# Patient Record
Sex: FEMALE | Race: WHITE | NOT HISPANIC OR LATINO | Employment: OTHER | URBAN - METROPOLITAN AREA
[De-identification: names, ages, dates, MRNs, and addresses within clinical notes are randomized per-mention and may not be internally consistent; named-entity substitution may affect disease eponyms.]

---

## 2017-01-05 ENCOUNTER — COMMUNICATION - HEALTHEAST (OUTPATIENT)
Dept: FAMILY MEDICINE | Facility: CLINIC | Age: 72
End: 2017-01-05

## 2017-01-05 DIAGNOSIS — K64.9 HEMORRHOIDS: ICD-10-CM

## 2017-02-27 ENCOUNTER — HOSPITAL ENCOUNTER (OUTPATIENT)
Dept: MAMMOGRAPHY | Facility: HOSPITAL | Age: 72
Discharge: HOME | End: 2017-02-27
Attending: FAMILY MEDICINE

## 2017-02-27 DIAGNOSIS — Z12.31 SCREENING MAMMOGRAM, ENCOUNTER FOR: ICD-10-CM

## 2017-03-06 ENCOUNTER — COMMUNICATION - HEALTHEAST (OUTPATIENT)
Dept: FAMILY MEDICINE | Facility: CLINIC | Age: 72
End: 2017-03-06

## 2017-03-06 ENCOUNTER — AMBULATORY - HEALTHEAST (OUTPATIENT)
Dept: LAB | Facility: CLINIC | Age: 72
End: 2017-03-06

## 2017-03-06 DIAGNOSIS — Z12.11 COLON CANCER SCREENING: ICD-10-CM

## 2017-07-15 ENCOUNTER — HEALTH MAINTENANCE LETTER (OUTPATIENT)
Age: 72
End: 2017-07-15

## 2017-08-18 ENCOUNTER — COMMUNICATION - HEALTHEAST (OUTPATIENT)
Dept: FAMILY MEDICINE | Facility: CLINIC | Age: 72
End: 2017-08-18

## 2017-08-18 DIAGNOSIS — K64.9 HEMORRHOIDS: ICD-10-CM

## 2017-09-16 ENCOUNTER — COMMUNICATION - HEALTHEAST (OUTPATIENT)
Dept: FAMILY MEDICINE | Facility: CLINIC | Age: 72
End: 2017-09-16

## 2017-09-16 DIAGNOSIS — E78.01 ESSENTIAL FAMILIAL HYPERCHOLESTEROLEMIA: ICD-10-CM

## 2017-10-05 ENCOUNTER — COMMUNICATION - HEALTHEAST (OUTPATIENT)
Dept: FAMILY MEDICINE | Facility: CLINIC | Age: 72
End: 2017-10-05

## 2017-10-05 DIAGNOSIS — I10 ESSENTIAL HYPERTENSION: ICD-10-CM

## 2017-10-24 ENCOUNTER — OFFICE VISIT - HEALTHEAST (OUTPATIENT)
Dept: FAMILY MEDICINE | Facility: CLINIC | Age: 72
End: 2017-10-24

## 2017-10-24 DIAGNOSIS — E78.5 HYPERLIPIDEMIA: ICD-10-CM

## 2017-10-24 DIAGNOSIS — Z00.00 HEALTH MAINTENANCE EXAMINATION: ICD-10-CM

## 2017-10-24 DIAGNOSIS — Z12.11 COLON CANCER SCREENING: ICD-10-CM

## 2017-10-24 DIAGNOSIS — I10 HTN (HYPERTENSION): ICD-10-CM

## 2017-10-24 LAB
CHOLEST SERPL-MCNC: 150 MG/DL
FASTING STATUS PATIENT QL REPORTED: NO
HDLC SERPL-MCNC: 52 MG/DL
LDLC SERPL CALC-MCNC: 64 MG/DL
TRIGL SERPL-MCNC: 168 MG/DL

## 2017-10-24 ASSESSMENT — MIFFLIN-ST. JEOR: SCORE: 1229

## 2017-10-29 ENCOUNTER — COMMUNICATION - HEALTHEAST (OUTPATIENT)
Dept: FAMILY MEDICINE | Facility: CLINIC | Age: 72
End: 2017-10-29

## 2017-12-06 ENCOUNTER — COMMUNICATION - HEALTHEAST (OUTPATIENT)
Dept: FAMILY MEDICINE | Facility: CLINIC | Age: 72
End: 2017-12-06

## 2017-12-06 DIAGNOSIS — E78.01 ESSENTIAL FAMILIAL HYPERCHOLESTEROLEMIA: ICD-10-CM

## 2017-12-06 DIAGNOSIS — F32.A DEPRESSION: ICD-10-CM

## 2017-12-07 ENCOUNTER — COMMUNICATION - HEALTHEAST (OUTPATIENT)
Dept: FAMILY MEDICINE | Facility: CLINIC | Age: 72
End: 2017-12-07

## 2017-12-07 DIAGNOSIS — I10 ESSENTIAL HYPERTENSION: ICD-10-CM

## 2017-12-11 ENCOUNTER — RECORDS - HEALTHEAST (OUTPATIENT)
Dept: ADMINISTRATIVE | Facility: OTHER | Age: 72
End: 2017-12-11

## 2018-03-14 ENCOUNTER — HOSPITAL ENCOUNTER (OUTPATIENT)
Dept: MAMMOGRAPHY | Facility: CLINIC | Age: 73
Discharge: HOME | End: 2018-03-14

## 2018-03-14 DIAGNOSIS — Z12.31 VISIT FOR SCREENING MAMMOGRAM: ICD-10-CM

## 2018-03-24 ENCOUNTER — HEALTH MAINTENANCE LETTER (OUTPATIENT)
Age: 73
End: 2018-03-24

## 2018-04-23 ENCOUNTER — AMBULATORY - HEALTHEAST (OUTPATIENT)
Dept: LAB | Facility: CLINIC | Age: 73
End: 2018-04-23

## 2018-04-23 DIAGNOSIS — Z12.11 COLON CANCER SCREENING: ICD-10-CM

## 2018-04-23 LAB
HEMOCCULT SP1 STL QL: NEGATIVE
HEMOCCULT SP2 STL QL: NEGATIVE
HEMOCCULT SP3 STL QL: NEGATIVE

## 2018-04-24 ENCOUNTER — COMMUNICATION - HEALTHEAST (OUTPATIENT)
Dept: FAMILY MEDICINE | Facility: CLINIC | Age: 73
End: 2018-04-24

## 2018-07-22 ENCOUNTER — HEALTH MAINTENANCE LETTER (OUTPATIENT)
Age: 73
End: 2018-07-22

## 2018-12-22 ENCOUNTER — COMMUNICATION - HEALTHEAST (OUTPATIENT)
Dept: FAMILY MEDICINE | Facility: CLINIC | Age: 73
End: 2018-12-22

## 2018-12-22 DIAGNOSIS — I10 ESSENTIAL HYPERTENSION: ICD-10-CM

## 2019-01-10 ENCOUNTER — RECORDS - HEALTHEAST (OUTPATIENT)
Dept: ADMINISTRATIVE | Facility: OTHER | Age: 74
End: 2019-01-10

## 2019-01-14 ENCOUNTER — AMBULATORY - HEALTHEAST (OUTPATIENT)
Dept: SCHEDULING | Facility: CLINIC | Age: 74
End: 2019-01-14

## 2019-01-14 ENCOUNTER — OFFICE VISIT - HEALTHEAST (OUTPATIENT)
Dept: FAMILY MEDICINE | Facility: CLINIC | Age: 74
End: 2019-01-14

## 2019-01-14 DIAGNOSIS — F32.0 MILD MAJOR DEPRESSION (H): ICD-10-CM

## 2019-01-14 DIAGNOSIS — E78.00 PURE HYPERCHOLESTEROLEMIA: ICD-10-CM

## 2019-01-14 DIAGNOSIS — I10 ESSENTIAL HYPERTENSION: ICD-10-CM

## 2019-01-14 DIAGNOSIS — Z78.0 POSTMENOPAUSAL: ICD-10-CM

## 2019-01-14 DIAGNOSIS — Z00.01 ENCOUNTER FOR GENERAL ADULT MEDICAL EXAMINATION WITH ABNORMAL FINDINGS: ICD-10-CM

## 2019-01-14 DIAGNOSIS — Z12.11 ENCOUNTER FOR SCREENING FOR MALIGNANT NEOPLASM OF COLON: ICD-10-CM

## 2019-01-14 DIAGNOSIS — Z13.220 ENCOUNTER FOR SCREENING FOR LIPOID DISORDERS: ICD-10-CM

## 2019-01-14 DIAGNOSIS — Z12.31 ENCOUNTER FOR SCREENING MAMMOGRAM FOR MALIGNANT NEOPLASM OF BREAST: ICD-10-CM

## 2019-01-14 ASSESSMENT — MIFFLIN-ST. JEOR: SCORE: 1182.73

## 2019-01-16 ENCOUNTER — COMMUNICATION - HEALTHEAST (OUTPATIENT)
Dept: FAMILY MEDICINE | Facility: CLINIC | Age: 74
End: 2019-01-16

## 2019-01-16 ENCOUNTER — AMBULATORY - HEALTHEAST (OUTPATIENT)
Dept: LAB | Facility: CLINIC | Age: 74
End: 2019-01-16

## 2019-01-16 DIAGNOSIS — E78.00 PURE HYPERCHOLESTEROLEMIA: ICD-10-CM

## 2019-01-16 DIAGNOSIS — Z13.220 ENCOUNTER FOR SCREENING FOR LIPOID DISORDERS: ICD-10-CM

## 2019-01-16 DIAGNOSIS — I10 ESSENTIAL HYPERTENSION: ICD-10-CM

## 2019-01-16 LAB
ALBUMIN SERPL-MCNC: 4.2 G/DL (ref 3.5–5)
ALP SERPL-CCNC: 79 U/L (ref 45–120)
ALT SERPL W P-5'-P-CCNC: 15 U/L (ref 0–45)
ANION GAP SERPL CALCULATED.3IONS-SCNC: 10 MMOL/L (ref 5–18)
AST SERPL W P-5'-P-CCNC: 16 U/L (ref 0–40)
BILIRUB SERPL-MCNC: 0.5 MG/DL (ref 0–1)
BUN SERPL-MCNC: 20 MG/DL (ref 8–28)
CALCIUM SERPL-MCNC: 10.5 MG/DL (ref 8.5–10.5)
CHLORIDE BLD-SCNC: 107 MMOL/L (ref 98–107)
CHOLEST SERPL-MCNC: 163 MG/DL
CO2 SERPL-SCNC: 24 MMOL/L (ref 22–31)
CREAT SERPL-MCNC: 0.77 MG/DL (ref 0.6–1.1)
FASTING STATUS PATIENT QL REPORTED: YES
GFR SERPL CREATININE-BSD FRML MDRD: >60 ML/MIN/1.73M2
GLUCOSE BLD-MCNC: 100 MG/DL (ref 70–125)
HDLC SERPL-MCNC: 58 MG/DL
LDLC SERPL CALC-MCNC: 85 MG/DL
POTASSIUM BLD-SCNC: 4.4 MMOL/L (ref 3.5–5)
PROT SERPL-MCNC: 7 G/DL (ref 6–8)
SODIUM SERPL-SCNC: 141 MMOL/L (ref 136–145)
TRIGL SERPL-MCNC: 98 MG/DL

## 2019-01-24 ENCOUNTER — COMMUNICATION - HEALTHEAST (OUTPATIENT)
Dept: FAMILY MEDICINE | Facility: CLINIC | Age: 74
End: 2019-01-24

## 2019-01-28 ENCOUNTER — COMMUNICATION - HEALTHEAST (OUTPATIENT)
Dept: FAMILY MEDICINE | Facility: CLINIC | Age: 74
End: 2019-01-28

## 2019-01-28 LAB
HEMOCCULT SP1 STL QL: NEGATIVE
HEMOCCULT SP2 STL QL: NEGATIVE
HEMOCCULT SP3 STL QL: NEGATIVE

## 2019-02-04 ENCOUNTER — COMMUNICATION - HEALTHEAST (OUTPATIENT)
Dept: FAMILY MEDICINE | Facility: CLINIC | Age: 74
End: 2019-02-04

## 2019-02-04 DIAGNOSIS — E78.01 ESSENTIAL FAMILIAL HYPERCHOLESTEROLEMIA: ICD-10-CM

## 2019-02-04 DIAGNOSIS — K64.9 HEMORRHOIDS: ICD-10-CM

## 2019-02-04 DIAGNOSIS — F32.A DEPRESSION: ICD-10-CM

## 2019-02-04 DIAGNOSIS — I10 ESSENTIAL HYPERTENSION: ICD-10-CM

## 2019-02-08 ENCOUNTER — COMMUNICATION - HEALTHEAST (OUTPATIENT)
Dept: FAMILY MEDICINE | Facility: CLINIC | Age: 74
End: 2019-02-08

## 2019-03-11 ENCOUNTER — AMBULATORY - HEALTHEAST (OUTPATIENT)
Dept: FAMILY MEDICINE | Facility: CLINIC | Age: 74
End: 2019-03-11

## 2019-03-20 ENCOUNTER — COMMUNICATION - HEALTHEAST (OUTPATIENT)
Dept: FAMILY MEDICINE | Facility: CLINIC | Age: 74
End: 2019-03-20

## 2019-03-20 ENCOUNTER — HOSPITAL ENCOUNTER (OUTPATIENT)
Dept: MAMMOGRAPHY | Facility: CLINIC | Age: 74
Discharge: HOME | End: 2019-03-20
Attending: FAMILY MEDICINE

## 2019-03-20 DIAGNOSIS — Z12.11 ENCOUNTER FOR SCREENING FOR MALIGNANT NEOPLASM OF COLON: ICD-10-CM

## 2019-03-20 DIAGNOSIS — Z12.31 ENCOUNTER FOR SCREENING MAMMOGRAM FOR MALIGNANT NEOPLASM OF BREAST: ICD-10-CM

## 2019-04-20 ENCOUNTER — COMMUNICATION - HEALTHEAST (OUTPATIENT)
Dept: FAMILY MEDICINE | Facility: CLINIC | Age: 74
End: 2019-04-20

## 2019-04-20 DIAGNOSIS — F32.A DEPRESSION: ICD-10-CM

## 2019-06-06 ENCOUNTER — COMMUNICATION - HEALTHEAST (OUTPATIENT)
Dept: FAMILY MEDICINE | Facility: CLINIC | Age: 74
End: 2019-06-06

## 2019-06-06 ENCOUNTER — OFFICE VISIT - HEALTHEAST (OUTPATIENT)
Dept: FAMILY MEDICINE | Facility: CLINIC | Age: 74
End: 2019-06-06

## 2019-06-06 DIAGNOSIS — F41.9 ANXIETY: ICD-10-CM

## 2019-06-06 DIAGNOSIS — F32.A DEPRESSION, UNSPECIFIED DEPRESSION TYPE: ICD-10-CM

## 2019-06-13 ENCOUNTER — OFFICE VISIT - HEALTHEAST (OUTPATIENT)
Dept: FAMILY MEDICINE | Facility: CLINIC | Age: 74
End: 2019-06-13

## 2019-06-13 DIAGNOSIS — F32.A DEPRESSION: ICD-10-CM

## 2019-06-13 DIAGNOSIS — F32.A DEPRESSION, UNSPECIFIED DEPRESSION TYPE: ICD-10-CM

## 2019-06-20 ENCOUNTER — AMBULATORY - HEALTHEAST (OUTPATIENT)
Dept: NURSING | Facility: CLINIC | Age: 74
End: 2019-06-20

## 2019-06-20 DIAGNOSIS — I10 ESSENTIAL HYPERTENSION: ICD-10-CM

## 2019-06-21 ENCOUNTER — COMMUNICATION - HEALTHEAST (OUTPATIENT)
Dept: FAMILY MEDICINE | Facility: CLINIC | Age: 74
End: 2019-06-21

## 2019-12-06 ENCOUNTER — COMMUNICATION - HEALTHEAST (OUTPATIENT)
Dept: FAMILY MEDICINE | Facility: CLINIC | Age: 74
End: 2019-12-06

## 2019-12-06 DIAGNOSIS — F32.A DEPRESSION, UNSPECIFIED DEPRESSION TYPE: ICD-10-CM

## 2020-01-20 ENCOUNTER — COMMUNICATION - HEALTHEAST (OUTPATIENT)
Dept: FAMILY MEDICINE | Facility: CLINIC | Age: 75
End: 2020-01-20

## 2020-01-20 DIAGNOSIS — I10 ESSENTIAL HYPERTENSION: ICD-10-CM

## 2020-01-20 DIAGNOSIS — E78.01 ESSENTIAL FAMILIAL HYPERCHOLESTEROLEMIA: ICD-10-CM

## 2020-05-07 ENCOUNTER — COMMUNICATION - HEALTHEAST (OUTPATIENT)
Dept: FAMILY MEDICINE | Facility: CLINIC | Age: 75
End: 2020-05-07

## 2020-05-07 DIAGNOSIS — E78.01 ESSENTIAL FAMILIAL HYPERCHOLESTEROLEMIA: ICD-10-CM

## 2020-05-07 DIAGNOSIS — I10 ESSENTIAL HYPERTENSION: ICD-10-CM

## 2020-05-28 ENCOUNTER — COMMUNICATION - HEALTHEAST (OUTPATIENT)
Dept: FAMILY MEDICINE | Facility: CLINIC | Age: 75
End: 2020-05-28

## 2020-05-28 DIAGNOSIS — F32.A DEPRESSION: ICD-10-CM

## 2020-07-20 ENCOUNTER — COMMUNICATION - HEALTHEAST (OUTPATIENT)
Dept: FAMILY MEDICINE | Facility: CLINIC | Age: 75
End: 2020-07-20

## 2020-07-20 DIAGNOSIS — F32.A DEPRESSION: ICD-10-CM

## 2020-07-26 ENCOUNTER — COMMUNICATION - HEALTHEAST (OUTPATIENT)
Dept: FAMILY MEDICINE | Facility: CLINIC | Age: 75
End: 2020-07-26

## 2020-08-03 ENCOUNTER — COMMUNICATION - HEALTHEAST (OUTPATIENT)
Dept: FAMILY MEDICINE | Facility: CLINIC | Age: 75
End: 2020-08-03

## 2020-08-03 DIAGNOSIS — I10 ESSENTIAL HYPERTENSION: ICD-10-CM

## 2020-08-03 DIAGNOSIS — E78.01 ESSENTIAL FAMILIAL HYPERCHOLESTEROLEMIA: ICD-10-CM

## 2020-08-03 DIAGNOSIS — F32.A DEPRESSION, UNSPECIFIED DEPRESSION TYPE: ICD-10-CM

## 2020-09-12 ENCOUNTER — COMMUNICATION - HEALTHEAST (OUTPATIENT)
Dept: FAMILY MEDICINE | Facility: CLINIC | Age: 75
End: 2020-09-12

## 2020-09-12 DIAGNOSIS — I10 ESSENTIAL HYPERTENSION: ICD-10-CM

## 2020-09-12 DIAGNOSIS — E78.01 ESSENTIAL FAMILIAL HYPERCHOLESTEROLEMIA: ICD-10-CM

## 2020-09-12 DIAGNOSIS — F32.A DEPRESSION, UNSPECIFIED DEPRESSION TYPE: ICD-10-CM

## 2020-09-18 ENCOUNTER — OFFICE VISIT - HEALTHEAST (OUTPATIENT)
Dept: FAMILY MEDICINE | Facility: CLINIC | Age: 75
End: 2020-09-18

## 2020-09-18 DIAGNOSIS — E78.00 PURE HYPERCHOLESTEROLEMIA: ICD-10-CM

## 2020-09-18 DIAGNOSIS — Z86.0100 HISTORY OF COLONIC POLYPS: ICD-10-CM

## 2020-09-18 DIAGNOSIS — Z12.11 SCREEN FOR COLON CANCER: ICD-10-CM

## 2020-09-18 DIAGNOSIS — I10 ESSENTIAL HYPERTENSION: ICD-10-CM

## 2020-09-18 DIAGNOSIS — Z78.0 OSTEOPENIA AFTER MENOPAUSE: ICD-10-CM

## 2020-09-18 DIAGNOSIS — M85.80 OSTEOPENIA AFTER MENOPAUSE: ICD-10-CM

## 2020-09-18 DIAGNOSIS — F32.0 MILD MAJOR DEPRESSION (H): ICD-10-CM

## 2020-09-18 DIAGNOSIS — Z00.01 ENCOUNTER FOR GENERAL ADULT MEDICAL EXAMINATION WITH ABNORMAL FINDINGS: ICD-10-CM

## 2020-09-18 DIAGNOSIS — E55.9 VITAMIN D DEFICIENCY: ICD-10-CM

## 2020-09-18 LAB
ALBUMIN SERPL-MCNC: 4.6 G/DL (ref 3.5–5)
ALP SERPL-CCNC: 80 U/L (ref 45–120)
ALT SERPL W P-5'-P-CCNC: 17 U/L (ref 0–45)
ANION GAP SERPL CALCULATED.3IONS-SCNC: 11 MMOL/L (ref 5–18)
AST SERPL W P-5'-P-CCNC: 15 U/L (ref 0–40)
BILIRUB SERPL-MCNC: 0.6 MG/DL (ref 0–1)
BUN SERPL-MCNC: 15 MG/DL (ref 8–28)
CALCIUM SERPL-MCNC: 9.9 MG/DL (ref 8.5–10.5)
CHLORIDE BLD-SCNC: 105 MMOL/L (ref 98–107)
CHOLEST SERPL-MCNC: 179 MG/DL
CO2 SERPL-SCNC: 24 MMOL/L (ref 22–31)
CREAT SERPL-MCNC: 0.76 MG/DL (ref 0.6–1.1)
ERYTHROCYTE [DISTWIDTH] IN BLOOD BY AUTOMATED COUNT: 13.2 % (ref 11–14.5)
FASTING STATUS PATIENT QL REPORTED: YES
GFR SERPL CREATININE-BSD FRML MDRD: >60 ML/MIN/1.73M2
GLUCOSE BLD-MCNC: 99 MG/DL (ref 70–125)
HCT VFR BLD AUTO: 43.9 % (ref 35–47)
HDLC SERPL-MCNC: 53 MG/DL
HGB BLD-MCNC: 14.7 G/DL (ref 12–16)
LDLC SERPL CALC-MCNC: 106 MG/DL
MCH RBC QN AUTO: 29.4 PG (ref 27–34)
MCHC RBC AUTO-ENTMCNC: 33.5 G/DL (ref 32–36)
MCV RBC AUTO: 88 FL (ref 80–100)
PLATELET # BLD AUTO: 193 THOU/UL (ref 140–440)
PMV BLD AUTO: 7.4 FL (ref 7–10)
POTASSIUM BLD-SCNC: 4.5 MMOL/L (ref 3.5–5)
PROT SERPL-MCNC: 7.1 G/DL (ref 6–8)
RBC # BLD AUTO: 5.01 MILL/UL (ref 3.8–5.4)
SODIUM SERPL-SCNC: 140 MMOL/L (ref 136–145)
TRIGL SERPL-MCNC: 101 MG/DL
WBC: 7.9 THOU/UL (ref 4–11)

## 2020-09-18 ASSESSMENT — MIFFLIN-ST. JEOR: SCORE: 1196.9

## 2020-09-21 LAB — 25(OH)D3 SERPL-MCNC: 40.3 NG/ML (ref 30–80)

## 2020-09-22 ENCOUNTER — COMMUNICATION - HEALTHEAST (OUTPATIENT)
Dept: FAMILY MEDICINE | Facility: CLINIC | Age: 75
End: 2020-09-22

## 2020-12-15 ENCOUNTER — COMMUNICATION - HEALTHEAST (OUTPATIENT)
Dept: FAMILY MEDICINE | Facility: CLINIC | Age: 75
End: 2020-12-15

## 2020-12-15 DIAGNOSIS — E78.01 ESSENTIAL FAMILIAL HYPERCHOLESTEROLEMIA: ICD-10-CM

## 2020-12-15 DIAGNOSIS — I10 ESSENTIAL HYPERTENSION: ICD-10-CM

## 2021-03-02 ENCOUNTER — AMBULATORY - HEALTHEAST (OUTPATIENT)
Dept: NURSING | Facility: CLINIC | Age: 76
End: 2021-03-02

## 2021-03-23 ENCOUNTER — AMBULATORY - HEALTHEAST (OUTPATIENT)
Dept: NURSING | Facility: CLINIC | Age: 76
End: 2021-03-23

## 2021-04-03 ENCOUNTER — HEALTH MAINTENANCE LETTER (OUTPATIENT)
Age: 76
End: 2021-04-03

## 2021-05-28 NOTE — TELEPHONE ENCOUNTER
Refill Approved    Rx renewed per Medication Renewal Policy. Medication was last renewed on 12/10/17.    Maile Carreon, [facility] Triage/Med Refill 4/23/2019     Requested Prescriptions   Pending Prescriptions Disp Refills     FLUoxetine (PROZAC) 10 MG capsule [Pharmacy Med Name: FLUOXETINE HCL 10 MG Capsule] 180 capsule 3     Sig: TAKE 2 CAPSULES EVERY DAY       SSRI Refill Protocol  Passed - 4/23/2019 11:07 AM        Passed - PCP or prescribing provider visit in last year     Last office visit with prescriber/PCP: 9/2/2016 Melanie Broussard MD OR same dept: Visit date not found OR same specialty: 12/22/2016 Hafsa Lopez, DESMOND  Last physical: 10/24/2017 Last MTM visit: Visit date not found   Next visit within 3 mo: Visit date not found  Next physical within 3 mo: Visit date not found  Prescriber OR PCP: Melanie Broussard MD  Last diagnosis associated with med order: 1. Depression  - FLUoxetine (PROZAC) 10 MG capsule [Pharmacy Med Name: FLUOXETINE HCL 10 MG Capsule]; TAKE 2 CAPSULES EVERY DAY  Dispense: 180 capsule; Refill: 3    If protocol passes may refill for 12 months if within 3 months of last provider visit (or a total of 15 months).

## 2021-05-28 NOTE — TELEPHONE ENCOUNTER
Who is calling:  Nika  Reason for Call:  Checking status of refill request for fluoxetine  Date of last appointment with primary care: 1/14/19  Okay to leave a detailed message: Yes    Please call patient with update.

## 2021-05-29 NOTE — TELEPHONE ENCOUNTER
Fax received from [facility], they have started the Prior Authorization Process via Cover My Meds    CoverMyMeds Key: [id number]    Medication Name:   FLUoxetine (PROZAC) 10 MG capsule 270 capsule 1 6/13/2019     Sig - Route: Take 3 capsules (30 mg total) by mouth daily. Increased dose - Oral    Sent to pharmacy as: FLUoxetine (PROZAC) 10 MG capsule    E-Prescribing Status: Receipt confirmed by pharmacy (6/13/2019 10:26 AM CDT)          Insurance Plan: [employer] Part D  PBM:   Patient ID: not provided on fax    Please complete the PA process

## 2021-05-29 NOTE — PATIENT INSTRUCTIONS - HE
Crisis services  Walk-in crisis services are available Monday-Friday from 8 a.m.-5:30 p.m. at [facility].  Support is also available through the 24/7/365 crisis line at [phone].    [facility]   Address  [address] [place] [address]  Hours  Monday through Friday, 8 a.m. - 5:30 p.m.  Closed on Saturday, Sunday and holidays.        [facility] provides service to adults (ages 18 and over) in [facility] who are experiencing a mental health or chemical health crisis. Walk-in services include access to an onsite team of psychiatrists, nurses, social workers and trained peer support staff that provide person-centered, recovery-focused care.  [facility] offers an alternative to visiting the emergency room during a mental health crisis. You can expect excellent, professional service in a caring, supportive environment.  Phone support is available 24/7 and mobile crisis teams are available to meet you at your home, school, workplace or in the community.  If you are in crisis, please call [phone] for assistance. Calls are answered 24/7/365.

## 2021-05-29 NOTE — TELEPHONE ENCOUNTER
Central PA team  [phone]  Pool: [facility] ([id number])          PA has been initiated.       PA form completed and faxed insurance via Cover My Meds     Key:  [id number] - PA Case ID: [id number] - Rx #: [id number]     Medication:  FLUoxetine HCl 10MG capsules    Insurance:  [employer]        Response will be received via fax and may take up to 5-10 business days depending on plan

## 2021-05-29 NOTE — PROGRESS NOTES
I met with Nika Navarrete at the request of Dr. Weaver to recheck her blood pressure.  Blood pressure medications on the MAR were reviewed with patient.    Patient has taken all medications as per usual regimen: Yes  Patient reports tolerating them without any issues or concerns: Yes    Vitals:    06/20/19 1002 06/20/19 1008   BP: 140/80 125/81   Patient Site: Right Arm Left Arm   Patient Position: Sitting Sitting   Cuff Size: Adult Regular Adult Regular   Pulse: 63 67       After 5 minutes, the patient's blood pressure was < 140/90, the previous encounter was reviewed, recorded blood pressure below 140/90.  Patient was discharged and the note will be sent to the provider for final review.

## 2021-05-29 NOTE — PROGRESS NOTES
ASSESSMENT & PLAN:  1. Depression, anxiety  Significant improvement over the last week.  Continue with current medication regimen extended release Wellbutrin 150 mg daily and fluoxetine 20 mg daily.  She could increase to 30 mg daily if she needs.  Follow-up office visit in 6 months but certainly sooner if problems or concerns, but also advised her to return within a week for nurse blood pressure check to make sure her blood pressure is in normal range.  - buPROPion (WELLBUTRIN XL) 150 MG 24 hr tablet; Take 1 tablet (150 mg total) by mouth every morning.  Dispense: 90 tablet; Refill: 1  - FLUoxetine (PROZAC) 10 MG capsule; Take 3 capsules (30 mg total) by mouth daily. Increased dose  Dispense: 270 capsule; Refill: 1      Patient Instructions   Return within a week for nurse BP check.      No orders of the defined types were placed in this encounter.    Medications Discontinued During This Encounter   Medication Reason     buPROPion (WELLBUTRIN XL) 150 MG 24 hr tablet Reorder     FLUoxetine (PROZAC) 10 MG capsule Reorder       Return in about 6 months (around 12/13/2019) for Recheck anxiety.    CHIEF COMPLAINT:  Chief Complaint   Patient presents with     Medication Management     wellbutrin , feels like its working        HISTORY OF PRESENT ILLNESS:  Nika is a 73 y.o. female presenting to the clinic today to follow-up anxiety.  Was here a week ago with significant flare in anxiety symptoms.  She was on fluoxetine 10 mg daily and was insistent that she could not increase dose due to sedation in the past with higher doses.  After much discussion we added Wellbutrin.  On her own, she also increased her fluoxetine to 20 mg daily and on a couple of days she took a third dose for a total of 30 mg fluoxetine.  She feels dramatically better.  She has no concerns today.  She would like to continue with this medication regimen.  No side effects from the Wellbutrin.    Blood pressure is elevated today which is not typical  for her.  She states she drank a lot of caffeine this morning.    REVIEW OF SYSTEMS:   All other systems are negative.    PFSH:  Patient Active Problem List   Diagnosis     Essential Hypercholesterolemia     Overweight     Lump Or Mass In Breast     Hypertension     Mild major depression (H)     History of colonic polyps        TOBACCO USE:  Social History     Tobacco Use   Smoking Status Former Smoker   Smokeless Tobacco Never Used   Tobacco Comment    quit 2010       VITALS:  Vitals:    06/13/19 0959 06/13/19 1029   BP: 168/80 (!) 160/91   Patient Site: Left Arm    Patient Position: Sitting    Cuff Size: Adult Regular    Pulse: (!) 55    Resp: 16    Temp: 97.1  F (36.2  C)    TempSrc: Oral    Weight: 161 lb 6.4 oz (73.2 kg)      Wt Readings from Last 3 Encounters:   06/13/19 161 lb 6.4 oz (73.2 kg)   06/06/19 161 lb 3.2 oz (73.1 kg)   01/14/19 160 lb 6.4 oz (72.8 kg)     Body mass index is 28.82 kg/m .    PHYSICAL EXAM:  GENERAL: Pleasant, well-appearing patient in no acute distress.   HEENT: Pupils equal round. Sclerae and conjunctivae clear. Oropharynx with moist mucous membranes.   EXTREMITIES Appear well-perfused without edema.   NEURO: Alert and oriented. Grossly nonfocal.   PSYCHIATRIC: Presents on time and well groomed. Normal speech and thought content. Full affect. No abnormal movements or behaviors noted.      MEDICATIONS:  Current Outpatient Medications   Medication Sig Dispense Refill     acetaminophen (TYLENOL) 500 MG tablet Take 500 mg by mouth every 6 (six) hours as needed for pain.       atorvastatin (LIPITOR) 20 MG tablet Take 1 tablet (20 mg total) by mouth at bedtime. 90 tablet 3     buPROPion (WELLBUTRIN XL) 150 MG 24 hr tablet Take 1 tablet (150 mg total) by mouth every morning. 90 tablet 1     CALCIUM CARBONATE (ANTACID CALCIUM ORAL) Take by mouth.       diphenhydrAMINE-acetaminophen (TYLENOL PM)  mg Tab Take 2 tablets by mouth at bedtime.  0     FLUoxetine (PROZAC) 10 MG capsule Take 3  capsules (30 mg total) by mouth daily. Increased dose 270 capsule 1     GREEN TEA EXTRACT ORAL Take by mouth.       hydrocortisone (PROCTOZONE-HC) 2.5 % rectal cream Insert into the rectum 2 (two) times a day. (Patient taking differently: Insert into the rectum 2 (two) times a day as needed.       ) 30 g 9     lisinopril (PRINIVIL,ZESTRIL) 20 MG tablet Take 1 tablet (20 mg total) by mouth daily. 90 tablet 3     ranitidine (ZANTAC) 150 MG tablet Take 1 tablet (150 mg total) by mouth daily as needed for heartburn. 60 tablet 1     No current facility-administered medications for this visit.

## 2021-05-29 NOTE — PROGRESS NOTES
Please inform patient that her blood pressure is in the normal range.  She should follow-up her previous plans.  John Contreras MD  6/20/2019

## 2021-05-29 NOTE — PROGRESS NOTES
ASSESSMENT & PLAN:  1. Depression, unspecified depression type  She insists she will not tolerate a higher dose of fluoxetine due to side effect of tiredness.  We discussed options including switching to a different medication, but she may also experience tiredness with other SSRIs or SNRIs.  Discussed augmenting with Wellbutrin, which should not cause tiredness, but may be activating or potentially worsen anxiety.  She is willing to try this medication, but she was advised to stop it and notify me if having any worsening symptoms or any concerns of side effect.  She understands to also continue her current dose of fluoxetine.  Offered referral to counseling but she declines.  Contracted for safety, and given crisis line information.  Return in 1 week but certainly be in touch sooner if problems or concerns.  - buPROPion (WELLBUTRIN XL) 150 MG 24 hr tablet; Take 1 tablet (150 mg total) by mouth every morning.  Dispense: 30 tablet; Refill: 0        Patient Instructions   Crisis services  Walk-in crisis services are available Monday-Friday from 8 a.m.-5:30 p.m. at [facility].  Support is also available through the 24/7/365 crisis line at [phone].    [facility]   Address  [address] [place] [address]  Hours  Monday through Friday, 8 a.m. - 5:30 p.m.  Closed on Saturday, Sunday and holidays.        [facility] provides service to adults (ages 18 and over) in [facility] who are experiencing a mental health or chemical health crisis. Walk-in services include access to an onsite team of psychiatrists, nurses, social workers and trained peer support staff that provide person-centered, recovery-focused care.  [facility] offers an alternative to visiting the emergency room during a mental health crisis. You can expect excellent, professional service in a caring, supportive environment.  Phone support  is available 24/7 and mobile crisis teams are available to meet you at your home, school, workplace or in the community.  If you are in crisis, please call [phone] for assistance. Calls are answered 24/7/365.    No orders of the defined types were placed in this encounter.    There are no discontinued medications.    Return in about 1 week (around 6/13/2019) for Recheck.    CHIEF COMPLAINT:  Chief Complaint   Patient presents with     Medication Management     depression med check, c/o feeling sleepy       HISTORY OF PRESENT ILLNESS:  Nika is a 73 y.o. female presenting to the clinic today for depression and anxiety symptoms.  Reports a recent flare in her symptoms, both depression and anxiety symptoms, and currently unmanageable on her current fluoxetine 10 mg daily.  Has in the past tried to increase to 20 mg daily but could not tolerate due to significant tiredness on the increased dose.  She does not recall how long she stayed at the higher dose, but states she has tried it at least 3 separate times in the past.  Recently had a relationship and is trying to keep herself busy with various activities to keep her mind off things.  She has tried counseling in the past but it was cost prohibitive.  She did recently have a fleeting thought of being better off dead, which really scared her and prompted her to come in, as her sister previously had a suicide attempt.  She states the thought went in and out of her mind in a flash, and she has no current thoughts of hurting herself, no plan.  No access to firearms.    REVIEW OF SYSTEMS:   All other systems are negative.    PFSH:  Patient Active Problem List   Diagnosis     Essential Hypercholesterolemia     Overweight     Lump Or Mass In Breast     Hypertension     Mild major depression (H)     History of colonic polyps        TOBACCO USE:  Social History     Tobacco Use   Smoking Status Former Smoker   Smokeless Tobacco Never Used   Tobacco Comment    quit 2010        VITALS:  Vitals:    06/06/19 1043 06/06/19 1045 06/06/19 1113   BP: 156/79 148/81 123/73   Patient Site: Left Arm Left Arm Left Arm   Patient Position: Sitting Sitting Sitting   Cuff Size: Adult Regular Adult Regular Adult Regular   Pulse: (!) 59 60 (!) 58   Resp: 16     Temp: 97.9  F (36.6  C) 97.9  F (36.6  C)    TempSrc: Oral Oral    Weight: 161 lb 3.2 oz (73.1 kg)       Wt Readings from Last 3 Encounters:   06/06/19 161 lb 3.2 oz (73.1 kg)   01/14/19 160 lb 6.4 oz (72.8 kg)   10/24/17 170 lb 9.6 oz (77.4 kg)     Body mass index is 28.78 kg/m .    PHYSICAL EXAM:  GENERAL: Pleasant, well-appearing patient in no acute distress.   HEENT: Pupils equal round. Sclerae and conjunctivae clear. Oropharynx with moist mucous membranes. Decreased hearing.   EXTREMITIES Warm and well-perfused without edema.  NEURO: Alert and oriented. Grossly nonfocal.   PSYCHIATRIC: Presents on time and well groomed. Normal speech and thought content. Full affect. Tearful at times. No abnormal movements or behaviors noted.        MEDICATIONS:  Current Outpatient Medications   Medication Sig Dispense Refill     acetaminophen (TYLENOL) 500 MG tablet Take 500 mg by mouth every 6 (six) hours as needed for pain.       atorvastatin (LIPITOR) 20 MG tablet Take 1 tablet (20 mg total) by mouth at bedtime. 90 tablet 3     CALCIUM CARBONATE (ANTACID CALCIUM ORAL) Take by mouth.       diphenhydrAMINE-acetaminophen (TYLENOL PM)  mg Tab Take 2 tablets by mouth at bedtime.  0     FLUoxetine (PROZAC) 10 MG capsule TAKE 2 CAPSULES EVERY  capsule 2     GREEN TEA EXTRACT ORAL Take by mouth.       hydrocortisone (PROCTOZONE-HC) 2.5 % rectal cream Insert into the rectum 2 (two) times a day. (Patient taking differently: Insert into the rectum 2 (two) times a day as needed.       ) 30 g 9     lisinopril (PRINIVIL,ZESTRIL) 20 MG tablet Take 1 tablet (20 mg total) by mouth daily. 90 tablet 3     ranitidine (ZANTAC) 150 MG tablet Take 1 tablet (150  mg total) by mouth daily as needed for heartburn. 60 tablet 1     buPROPion (WELLBUTRIN XL) 150 MG 24 hr tablet Take 1 tablet (150 mg total) by mouth every morning. 30 tablet 0     No current facility-administered medications for this visit.

## 2021-05-31 ENCOUNTER — RECORDS - HEALTHEAST (OUTPATIENT)
Dept: ADMINISTRATIVE | Facility: CLINIC | Age: 76
End: 2021-05-31

## 2021-05-31 VITALS — HEIGHT: 63 IN | BODY MASS INDEX: 30.23 KG/M2 | WEIGHT: 170.6 LBS

## 2021-06-02 VITALS — HEIGHT: 63 IN | BODY MASS INDEX: 28.42 KG/M2 | WEIGHT: 160.4 LBS

## 2021-06-02 VITALS — WEIGHT: 161.2 LBS | BODY MASS INDEX: 28.78 KG/M2

## 2021-06-03 VITALS — BODY MASS INDEX: 28.82 KG/M2 | WEIGHT: 161.4 LBS

## 2021-06-04 VITALS
RESPIRATION RATE: 18 BRPM | BODY MASS INDEX: 29.13 KG/M2 | DIASTOLIC BLOOD PRESSURE: 96 MMHG | SYSTOLIC BLOOD PRESSURE: 146 MMHG | HEIGHT: 63 IN | TEMPERATURE: 98.1 F | HEART RATE: 67 BPM | WEIGHT: 164.4 LBS

## 2021-06-04 NOTE — TELEPHONE ENCOUNTER
Refill Approved    Rx renewed per Medication Renewal Policy. Medication was last renewed on 6/13/19, last OV 6/13/19.    Preeti Salamnaca, [facility] Triage/Med Refill 12/7/2019     Requested Prescriptions   Pending Prescriptions Disp Refills     buPROPion (WELLBUTRIN XL) 150 MG 24 hr tablet [Pharmacy Med Name: BUPROPION HYDROCHLORIDE ER (XL) 150 MG Tablet Extended Release 24 Hour] 90 tablet 0     Sig: TAKE 1 TABLET (150 MG TOTAL) BY MOUTH EVERY MORNING.       Tricyclics/Misc Antidepressant/Antianxiety Meds Refill Protocol Passed - 12/6/2019  3:35 PM        Passed - PCP or prescribing provider visit in last year     Last office visit with prescriber/PCP: 6/13/2019 Melanie Broussard MD OR same dept: 6/13/2019 Melanie Broussard MD OR same specialty: 6/13/2019 Melanie Broussard MD  Last physical: 10/24/2017 Last MTM visit: Visit date not found   Next visit within 3 mo: Visit date not found  Next physical within 3 mo: Visit date not found  Prescriber OR PCP: Melanie Broussard MD  Last diagnosis associated with med order: 1. Depression, unspecified depression type  - buPROPion (WELLBUTRIN XL) 150 MG 24 hr tablet [Pharmacy Med Name: BUPROPION HYDROCHLORIDE ER (XL) 150 MG Tablet Extended Release 24 Hour]; Take 1 tablet (150 mg total) by mouth every morning.  Dispense: 90 tablet; Refill: 0    If protocol passes may refill for 12 months if within 3 months of last provider visit (or a total of 15 months).

## 2021-06-05 NOTE — TELEPHONE ENCOUNTER
RN cannot approve Refill Request    RN can NOT refill this medication PCP messaged that patient is overdue for Labs and Protocol failed and RN gave three month refill. Last office visit: Visit date not found Last Physical: 1/14/2019 Last MTM visit: Visit date not found Last visit same specialty: 6/13/2019 Melanie Broussard MD.  Next visit within 3 mo: Visit date not found  Next physical within 3 mo: Visit date not found  Last refill 2/4/2019 for 90/3  Last OV 6/13/2019       Sara Barnes, [facility] Triage/Med Refill 1/20/2020    Requested Prescriptions   Pending Prescriptions Disp Refills     lisinopril (PRINIVIL,ZESTRIL) 20 MG tablet [Pharmacy Med Name: LISINOPRIL 20 MG Tablet] 90 tablet 3     Sig: TAKE 1 TABLET EVERY DAY       Ace Inhibitors Refill Protocol Failed - 1/20/2020  9:22 AM        Failed - Serum Potassium in past 12 months     No results found for: LN-POTASSIUM          Failed - Serum Creatinine in past 12 months     Creatinine   Date Value Ref Range Status   01/16/2019 0.77 0.60 - 1.10 mg/dL Final             Passed - PCP or prescribing provider visit in past 12 months       Last office visit with prescriber/PCP: Visit date not found OR same dept: 6/13/2019 Melanie Broussard MD OR same specialty: 6/13/2019 Melanie Broussard MD  Last physical: 1/14/2019 Last MTM visit: Visit date not found   Next visit within 3 mo: Visit date not found  Next physical within 3 mo: Visit date not found  Prescriber OR PCP: Paula Weaver MD  Last diagnosis associated with med order: 1. Essential hypertension  - lisinopril (PRINIVIL,ZESTRIL) 20 MG tablet [Pharmacy Med Name: LISINOPRIL 20 MG Tablet]; TAKE 1 TABLET EVERY DAY  Dispense: 90 tablet; Refill: 3    2. Essential familial hypercholesterolemia  - atorvastatin (LIPITOR) 20 MG tablet; Take 1 tablet (20 mg total) by mouth at bedtime. You are overdue for a clinic visit; call 24/7  Dispense: 90 tablet; Refill: 0    If protocol passes may refill for 12  months if within 3 months of last provider visit (or a total of 15 months).             Passed - Blood pressure filed in past 12 months     BP Readings from Last 1 Encounters:   06/20/19 125/81           Signed Prescriptions Disp Refills    atorvastatin (LIPITOR) 20 MG tablet 90 tablet 0     Sig: Take 1 tablet (20 mg total) by mouth at bedtime. You are overdue for a clinic visit; call 24/7       Statins Refill Protocol (Hmg CoA Reductase Inhibitors) Passed - 1/20/2020  9:22 AM        Passed - PCP or prescribing provider visit in past 12 months      Last office visit with prescriber/PCP: Visit date not found OR same dept: 6/13/2019 Melanie Broussard MD OR same specialty: 6/13/2019 Melanie Broussard MD  Last physical: 1/14/2019 Last MTM visit: Visit date not found   Next visit within 3 mo: Visit date not found  Next physical within 3 mo: Visit date not found  Prescriber OR PCP: Paula Weaver MD  Last diagnosis associated with med order: 1. Essential hypertension  - lisinopril (PRINIVIL,ZESTRIL) 20 MG tablet [Pharmacy Med Name: LISINOPRIL 20 MG Tablet]; TAKE 1 TABLET EVERY DAY  Dispense: 90 tablet; Refill: 3    2. Essential familial hypercholesterolemia  - atorvastatin (LIPITOR) 20 MG tablet; Take 1 tablet (20 mg total) by mouth at bedtime. You are overdue for a clinic visit; call 24/7  Dispense: 90 tablet; Refill: 0    If protocol passes may refill for 12 months if within 3 months of last provider visit (or a total of 15 months).

## 2021-06-05 NOTE — TELEPHONE ENCOUNTER
Refill Given  Atorvastatin Only    Refill given per Policy, patient informed they are overdue for Office Visit   Last OV 6/13/2019      RTC about 12/13/2019  Last refilled 2/4/2019 for 90/3     [facility] Triage/Med Refill 1/20/2020    Requested Prescriptions   Pending Prescriptions Disp Refills     lisinopril (PRINIVIL,ZESTRIL) 20 MG tablet [Pharmacy Med Name: LISINOPRIL 20 MG Tablet] 90 tablet 3     Sig: TAKE 1 TABLET EVERY DAY       Ace Inhibitors Refill Protocol Failed - 1/20/2020  9:22 AM        Failed - Serum Potassium in past 12 months     No results found for: LN-POTASSIUM          Failed - Serum Creatinine in past 12 months     Creatinine   Date Value Ref Range Status   01/16/2019 0.77 0.60 - 1.10 mg/dL Final             Passed - PCP or prescribing provider visit in past 12 months       Last office visit with prescriber/PCP: Visit date not found OR same dept: 6/13/2019 Melanie Broussard MD OR same specialty: 6/13/2019 Melanie Broussard MD  Last physical: 1/14/2019 Last MTM visit: Visit date not found   Next visit within 3 mo: Visit date not found  Next physical within 3 mo: Visit date not found  Prescriber OR PCP: Paula Weaver MD  Last diagnosis associated with med order: 1. Essential hypertension  - lisinopril (PRINIVIL,ZESTRIL) 20 MG tablet [Pharmacy Med Name: LISINOPRIL 20 MG Tablet]; TAKE 1 TABLET EVERY DAY  Dispense: 90 tablet; Refill: 3    2. Essential familial hypercholesterolemia  - atorvastatin (LIPITOR) 20 MG tablet [Pharmacy Med Name: ATORVASTATIN CALCIUM 20 MG Tablet]; TAKE 1 TABLET AT BEDTIME  Dispense: 90 tablet; Refill: 3    If protocol passes may refill for 12 months if within 3 months of last provider visit (or a total of 15 months).             Passed - Blood pressure filed in past 12 months     BP Readings from Last 1 Encounters:   06/20/19 125/81             atorvastatin (LIPITOR) 20 MG tablet [Pharmacy Med Name: ATORVASTATIN CALCIUM 20 MG Tablet] 90  tablet 3     Sig: TAKE 1 TABLET AT BEDTIME       Statins Refill Protocol (Hmg CoA Reductase Inhibitors) Passed - 1/20/2020  9:22 AM        Passed - PCP or prescribing provider visit in past 12 months      Last office visit with prescriber/PCP: Visit date not found OR same dept: 6/13/2019 Melanie Broussard MD OR same specialty: 6/13/2019 Melanie Broussard MD  Last physical: 1/14/2019 Last MTM visit: Visit date not found   Next visit within 3 mo: Visit date not found  Next physical within 3 mo: Visit date not found  Prescriber OR PCP: Paula Weaver MD  Last diagnosis associated with med order: 1. Essential hypertension  - lisinopril (PRINIVIL,ZESTRIL) 20 MG tablet [Pharmacy Med Name: LISINOPRIL 20 MG Tablet]; TAKE 1 TABLET EVERY DAY  Dispense: 90 tablet; Refill: 3    2. Essential familial hypercholesterolemia  - atorvastatin (LIPITOR) 20 MG tablet [Pharmacy Med Name: ATORVASTATIN CALCIUM 20 MG Tablet]; TAKE 1 TABLET AT BEDTIME  Dispense: 90 tablet; Refill: 3    If protocol passes may refill for 12 months if within 3 months of last provider visit (or a total of 15 months).              GENERAL: no acute distress, non-toxic appearing  HEENT: atraumatic, no midline c spine tend  CARDIAC: regular rate and regular rhythm, 2+ distal pulses all extremities  PULM: clear to ascultation bilaterally, no incr wob  GI: abdomen nondistended, soft, nontender  NEURO: alert and oriented x 3, normal speech, 5/5 strength all extremities  MSK: no visible or palpable deformities, no swelling of any large joints, pt with pain to navicular to L foot and pain to post aspect LM  SKIN: no visible rashes, lacs, ecchymosis  PSYCH: anxious affect GENERAL: no acute distress, non-toxic appearing  HEENT: atraumatic, no midline c spine tend  CARDIAC: regular rate and regular rhythm, 2+ distal pulses all extremities  PULM: clear to ascultation bilaterally, no incr wob  GI: abdomen nondistended, soft, nontender  NEURO: alert and oriented x 3, normal speech, 5/5 strength all extremities  MSK: no visible or palpable deformities, no swelling of any large joints, pt with pain to navicular to L foot and pain to post aspect LM. spine - no crepitus/no step off  SKIN: no visible rashes, lacs, ecchymosis  PSYCH: anxious affect

## 2021-06-08 NOTE — TELEPHONE ENCOUNTER
Who is calling:  Patient   Reason for Call:  Caller stated that she only has 4 left for Highness, Paula CORTES MD to hurry up and send refills over as soon as possible.   Date of last appointment with primary care:   Okay to leave a detailed message: Yes

## 2021-06-08 NOTE — TELEPHONE ENCOUNTER
Due to be seen    Rx renewed per Medication Renewal Policy. Medication was last renewed on 6/13/19.    Maile Carreon, [facility] Triage/Med Refill 6/1/2020     Requested Prescriptions   Pending Prescriptions Disp Refills     FLUoxetine (PROZAC) 10 MG capsule [Pharmacy Med Name: FLUOXETINE HYDROCHLORIDE 10 MG Capsule] 270 capsule 1     Sig: TAKE 3 CAPSULES DAILY (DOSE INCREASE)       SSRI Refill Protocol  Passed - 6/1/2020  9:34 AM        Passed - PCP or prescribing provider visit in last year     Last office visit with prescriber/PCP: 6/13/2019 Melanie Broussard MD OR same dept: 6/13/2019 Melanie Broussard MD OR same specialty: 6/13/2019 Melanie Broussard MD  Last physical: 10/24/2017 Last MTM visit: Visit date not found   Next visit within 3 mo: Visit date not found  Next physical within 3 mo: Visit date not found  Prescriber OR PCP: Melanie Broussard MD  Last diagnosis associated with med order: 1. Depression  - FLUoxetine (PROZAC) 10 MG capsule [Pharmacy Med Name: FLUOXETINE HYDROCHLORIDE 10 MG Capsule]; TAKE 3 CAPSULES DAILY (DOSE INCREASE)  Dispense: 270 capsule; Refill: 1    If protocol passes may refill for 12 months if within 3 months of last provider visit (or a total of 15 months).

## 2021-06-08 NOTE — TELEPHONE ENCOUNTER
RN cannot approve Refill Request    RN can NOT refill this medication Protocol failed and NO refill given.       Maile Carreon, [facility] Triage/Med Refill 5/8/2020    Requested Prescriptions   Pending Prescriptions Disp Refills     atorvastatin (LIPITOR) 20 MG tablet [Pharmacy Med Name: ATORVASTATIN CALCIUM 20 MG Tablet] 90 tablet 0     Sig: TAKE 1 TABLET AT BEDTIME (YOU ARE OVERDUE FOR A CLINIC VISIT, CALL 24/7)       Statins Refill Protocol (Hmg CoA Reductase Inhibitors) Passed - 5/7/2020  4:06 PM        Passed - PCP or prescribing provider visit in past 12 months      Last office visit with prescriber/PCP: Visit date not found OR same dept: 6/13/2019 Melanie Broussard MD OR same specialty: 6/13/2019 Melanie Broussard MD  Last physical: 1/14/2019 Last MTM visit: Visit date not found   Next visit within 3 mo: Visit date not found  Next physical within 3 mo: Visit date not found  Prescriber OR PCP: Paula Weaver MD  Last diagnosis associated with med order: 1. Essential familial hypercholesterolemia  - atorvastatin (LIPITOR) 20 MG tablet [Pharmacy Med Name: ATORVASTATIN CALCIUM 20 MG Tablet]; TAKE 1 TABLET AT BEDTIME (YOU ARE OVERDUE FOR A CLINIC VISIT, CALL 24/7)  Dispense: 90 tablet; Refill: 0    2. Essential hypertension  - lisinopriL (PRINIVIL,ZESTRIL) 20 MG tablet [Pharmacy Med Name: LISINOPRIL 20 MG Tablet]; TAKE 1 TABLET EVERY DAY (CALL 24/7 TO SCHEDULE YOUR FOLLOW UP VISIT AND LABS)  Dispense: 90 tablet; Refill: 0    If protocol passes may refill for 12 months if within 3 months of last provider visit (or a total of 15 months).                lisinopriL (PRINIVIL,ZESTRIL) 20 MG tablet [Pharmacy Med Name: LISINOPRIL 20 MG Tablet] 90 tablet 0     Sig: TAKE 1 TABLET EVERY DAY (CALL 24/7 TO SCHEDULE YOUR FOLLOW UP VISIT AND LABS)       Ace Inhibitors Refill Protocol Failed - 5/7/2020  4:06 PM        Failed - Serum Potassium in past 12 months     No results found for: LN-POTASSIUM          Failed  - Serum Creatinine in past 12 months     Creatinine   Date Value Ref Range Status   01/16/2019 0.77 0.60 - 1.10 mg/dL Final             Passed - PCP or prescribing provider visit in past 12 months       Last office visit with prescriber/PCP: Visit date not found OR same dept: 6/13/2019 Melanie Broussard MD OR same specialty: 6/13/2019 Melanie Broussard MD  Last physical: 1/14/2019 Last MTM visit: Visit date not found   Next visit within 3 mo: Visit date not found  Next physical within 3 mo: Visit date not found  Prescriber OR PCP: Paula Weaver MD  Last diagnosis associated with med order: 1. Essential familial hypercholesterolemia  - atorvastatin (LIPITOR) 20 MG tablet [Pharmacy Med Name: ATORVASTATIN CALCIUM 20 MG Tablet]; TAKE 1 TABLET AT BEDTIME (YOU ARE OVERDUE FOR A CLINIC VISIT, CALL 24/7)  Dispense: 90 tablet; Refill: 0    2. Essential hypertension  - lisinopriL (PRINIVIL,ZESTRIL) 20 MG tablet [Pharmacy Med Name: LISINOPRIL 20 MG Tablet]; TAKE 1 TABLET EVERY DAY (CALL 24/7 TO SCHEDULE YOUR FOLLOW UP VISIT AND LABS)  Dispense: 90 tablet; Refill: 0    If protocol passes may refill for 12 months if within 3 months of last provider visit (or a total of 15 months).             Passed - Blood pressure filed in past 12 months     BP Readings from Last 1 Encounters:   06/20/19 125/81

## 2021-06-08 NOTE — TELEPHONE ENCOUNTER
Who is calling:    Patient    Reason for Call:    Checking on status of med refill.  Almost out.    Date of last appointment with primary care: 01/14/2019    Okay to leave a detailed message: Yes

## 2021-06-09 NOTE — TELEPHONE ENCOUNTER
RN cannot approve Refill Request    RN can NOT refill this medication Protocol failed and NO refill given.       Maile Carreon, [facility] Triage/Med Refill 7/20/2020    Requested Prescriptions   Pending Prescriptions Disp Refills     FLUoxetine (PROZAC) 10 MG capsule 270 capsule 1       SSRI Refill Protocol  Failed - 7/20/2020  1:09 PM        Failed - PCP or prescribing provider visit in last year     Last office visit with prescriber/PCP: Visit date not found OR same dept: Visit date not found OR same specialty: 6/13/2019 Melanie Broussard MD  Last physical: 1/14/2019 Last MTM visit: Visit date not found   Next visit within 3 mo: Visit date not found  Next physical within 3 mo: Visit date not found  Prescriber OR PCP: Paula Weaver MD  Last diagnosis associated with med order: 1. Depression  - FLUoxetine (PROZAC) 10 MG capsule  Dispense: 270 capsule; Refill: 1    If protocol passes may refill for 12 months if within 3 months of last provider visit (or a total of 15 months).

## 2021-06-10 NOTE — TELEPHONE ENCOUNTER
RN cannot approve Refill Request    RN can NOT refill this medication Protocol failed and NO refill given. Last office visit: Visit date not found Last Physical: 1/14/2019 Last MTM visit: Visit date not found Last visit same specialty: 6/13/2019 Melanie Broussard MD.  Next visit within 3 mo: Visit date not found  Next physical within 3 mo: Visit date not found      Maile Carreon, [facility] Triage/Med Refill 8/4/2020    Requested Prescriptions   Pending Prescriptions Disp Refills     atorvastatin (LIPITOR) 20 MG tablet 90 tablet 1     Sig: TAKE 1 TABLET AT BEDTIME (YOU ARE OVERDUE FOR A CLINIC VISIT, CALL 24/7)       Statins Refill Protocol (Hmg CoA Reductase Inhibitors) Failed - 8/3/2020  3:30 PM        Failed - PCP or prescribing provider visit in past 12 months      Last office visit with prescriber/PCP: Visit date not found OR same dept: Visit date not found OR same specialty: 6/13/2019 Melanie Broussard MD  Last physical: 1/14/2019 Last MTM visit: Visit date not found   Next visit within 3 mo: Visit date not found  Next physical within 3 mo: Visit date not found  Prescriber OR PCP: Paula Weaver MD  Last diagnosis associated with med order: 1. Essential familial hypercholesterolemia  - atorvastatin (LIPITOR) 20 MG tablet; TAKE 1 TABLET AT BEDTIME (YOU ARE OVERDUE FOR A CLINIC VISIT, CALL 24/7)  Dispense: 90 tablet; Refill: 1    2. Essential hypertension  - lisinopriL (PRINIVIL,ZESTRIL) 20 MG tablet; TAKE 1 TABLET EVERY DAY (CALL 24/7 TO SCHEDULE YOUR FOLLOW UP VISIT AND LABS)  Dispense: 90 tablet; Refill: 1    3. Depression, unspecified depression type  - buPROPion (WELLBUTRIN XL) 150 MG 24 hr tablet; Take 1 tablet (150 mg total) by mouth every morning.  Dispense: 90 tablet; Refill: 1    If protocol passes may refill for 12 months if within 3 months of last provider visit (or a total of 15 months).                lisinopriL (PRINIVIL,ZESTRIL) 20 MG tablet 90 tablet 1     Sig: TAKE 1 TABLET EVERY  DAY (CALL 24/7 TO SCHEDULE YOUR FOLLOW UP VISIT AND LABS)       Ace Inhibitors Refill Protocol Failed - 8/3/2020  3:30 PM        Failed - PCP or prescribing provider visit in past 12 months       Last office visit with prescriber/PCP: Visit date not found OR same dept: Visit date not found OR same specialty: 6/13/2019 Melanie Broussard MD  Last physical: 1/14/2019 Last MTM visit: Visit date not found   Next visit within 3 mo: Visit date not found  Next physical within 3 mo: Visit date not found  Prescriber OR PCP: Paula Weaver MD  Last diagnosis associated with med order: 1. Essential familial hypercholesterolemia  - atorvastatin (LIPITOR) 20 MG tablet; TAKE 1 TABLET AT BEDTIME (YOU ARE OVERDUE FOR A CLINIC VISIT, CALL 24/7)  Dispense: 90 tablet; Refill: 1    2. Essential hypertension  - lisinopriL (PRINIVIL,ZESTRIL) 20 MG tablet; TAKE 1 TABLET EVERY DAY (CALL 24/7 TO SCHEDULE YOUR FOLLOW UP VISIT AND LABS)  Dispense: 90 tablet; Refill: 1    3. Depression, unspecified depression type  - buPROPion (WELLBUTRIN XL) 150 MG 24 hr tablet; Take 1 tablet (150 mg total) by mouth every morning.  Dispense: 90 tablet; Refill: 1    If protocol passes may refill for 12 months if within 3 months of last provider visit (or a total of 15 months).             Failed - Serum Potassium in past 12 months     No results found for: LN-POTASSIUM          Failed - Blood pressure filed in past 12 months     BP Readings from Last 1 Encounters:   06/20/19 125/81             Failed - Serum Creatinine in past 12 months     Creatinine   Date Value Ref Range Status   01/16/2019 0.77 0.60 - 1.10 mg/dL Final                buPROPion (WELLBUTRIN XL) 150 MG 24 hr tablet 90 tablet 1     Sig: Take 1 tablet (150 mg total) by mouth every morning.       Tricyclics/Misc Antidepressant/Antianxiety Meds Refill Protocol Failed - 8/3/2020  3:30 PM        Failed - PCP or prescribing provider visit in last year     Last office visit with prescriber/PCP:  Visit date not found OR same dept: Visit date not found OR same specialty: 6/13/2019 Melanie Broussard MD  Last physical: 1/14/2019 Last MTM visit: Visit date not found   Next visit within 3 mo: Visit date not found  Next physical within 3 mo: Visit date not found  Prescriber OR PCP: Paula Weaver MD  Last diagnosis associated with med order: 1. Essential familial hypercholesterolemia  - atorvastatin (LIPITOR) 20 MG tablet; TAKE 1 TABLET AT BEDTIME (YOU ARE OVERDUE FOR A CLINIC VISIT, CALL 24/7)  Dispense: 90 tablet; Refill: 1    2. Essential hypertension  - lisinopriL (PRINIVIL,ZESTRIL) 20 MG tablet; TAKE 1 TABLET EVERY DAY (CALL 24/7 TO SCHEDULE YOUR FOLLOW UP VISIT AND LABS)  Dispense: 90 tablet; Refill: 1    3. Depression, unspecified depression type  - buPROPion (WELLBUTRIN XL) 150 MG 24 hr tablet; Take 1 tablet (150 mg total) by mouth every morning.  Dispense: 90 tablet; Refill: 1    If protocol passes may refill for 12 months if within 3 months of last provider visit (or a total of 15 months).

## 2021-06-10 NOTE — TELEPHONE ENCOUNTER
Prior Authorization Request  Who s requesting:  Pharmacy  Pharmacy Name and Location: [facility]  Medication Name: FLUoxetine (PROZAC) 10 MG capsule  Insurance Plan: Medicare  Insurance Member ID Number:  [id number]  CoverMyMeds Key: [id number]  Informed patient that prior authorizations can take up to 10 business days for response:   NA  Okay to leave a detailed message: Yes

## 2021-06-10 NOTE — TELEPHONE ENCOUNTER
Central PA team  [phone]  Pool: HE PA MED ([id number])          PA has been initiated.       PA form completed and faxed insurance via Cover My Meds     Key:  [id number]      Medication:  Fluoxetine 10mg    Insurance:  [employer]        Response will be received via fax and may take up to 5-10 business days depending on plan

## 2021-06-11 NOTE — TELEPHONE ENCOUNTER
----- Message from Paula Weaver MD sent at 9/22/2020 11:14 AM CDT -----  Please call patient: labs all good.  When result of occult blood testing done for colon cancer comes back, i'll let her know.    Also please return in clinic in a week for nurse only BPcheck. Her BP was high during recent clinic visit with me. Please schedule her.    Please send result letter with above message.

## 2021-06-11 NOTE — TELEPHONE ENCOUNTER
Reason contacted:  Results  Information relayed:  Informed patient of message below. Patient states understanding.   Additional questions:  No  Further follow-up needed:  No  Okay to leave a detailed message:  No     Letter mailed     FYI: Patient states that she will not come back into the clinic because of the Covid. Patient states that once it calms down she will come in for a bp check. Patient states that her Bp is always high the first time it is checked and normally goes down the second time.

## 2021-06-11 NOTE — TELEPHONE ENCOUNTER
RN cannot approve Refill Request    RN can NOT refill this medication PCP messaged that patient is overdue for Office Visit. Last office visit: 6/13/2019 Melanie Broussard MD Last Physical: 10/24/2017 Last MTM visit: Visit date not found Last visit same specialty: 6/13/2019 Melanie Broussard MD.  Next visit within 3 mo: Visit date not found  Next physical within 3 mo: Visit date not found      Preeti Salamanca, [facility] Triage/Med Refill 9/13/2020    Requested Prescriptions   Pending Prescriptions Disp Refills     buPROPion (WELLBUTRIN XL) 150 MG 24 hr tablet [Pharmacy Med Name: BUPROPION HYDROCHLORIDE ER (XL) 150 MG Tablet Extended Release 24 Hour] 90 tablet 0     Sig: TAKE 1 TABLET (150 MG TOTAL) BY MOUTH EVERY MORNING.       Tricyclics/Misc Antidepressant/Antianxiety Meds Refill Protocol Failed - 9/12/2020 10:49 AM        Failed - PCP or prescribing provider visit in last year     Last office visit with prescriber/PCP: 6/13/2019 Melanie Broussard MD OR same dept: Visit date not found OR same specialty: 6/13/2019 Melanie Broussard MD  Last physical: 10/24/2017 Last MTM visit: Visit date not found   Next visit within 3 mo: Visit date not found  Next physical within 3 mo: Visit date not found  Prescriber OR PCP: Melanie Broussard MD  Last diagnosis associated with med order: 1. Depression, unspecified depression type  - buPROPion (WELLBUTRIN XL) 150 MG 24 hr tablet [Pharmacy Med Name: BUPROPION HYDROCHLORIDE ER (XL) 150 MG Tablet Extended Release 24 Hour]; Take 1 tablet (150 mg total) by mouth every morning.  Dispense: 90 tablet; Refill: 0    If protocol passes may refill for 12 months if within 3 months of last provider visit (or a total of 15 months).

## 2021-06-11 NOTE — TELEPHONE ENCOUNTER
RN cannot approve Refill Request    RN can NOT refill this medication PCP messaged that patient is overdue for Office Visit. Last office visit: Visit date not found Last Physical: 1/14/2019 Last MTM visit: Visit date not found Last visit same specialty: 6/13/2019 Melanie Broussard MD.  Next visit within 3 mo: Visit date not found  Next physical within 3 mo: Visit date not found      Preeti Salamanca, [facility] Triage/Med Refill 9/13/2020    Requested Prescriptions   Pending Prescriptions Disp Refills     atorvastatin (LIPITOR) 20 MG tablet [Pharmacy Med Name: ATORVASTATIN CALCIUM 20 MG Tablet] 30 tablet 0     Sig: TAKE 1 TABLET AT BEDTIME (YOU ARE OVERDUE FOR A CLINIC VISIT, CALL 24/7)       Statins Refill Protocol (Hmg CoA Reductase Inhibitors) Failed - 9/12/2020 10:49 AM        Failed - PCP or prescribing provider visit in past 12 months      Last office visit with prescriber/PCP: Visit date not found OR same dept: Visit date not found OR same specialty: 6/13/2019 Melanie Broussard MD  Last physical: 1/14/2019 Last MTM visit: Visit date not found   Next visit within 3 mo: Visit date not found  Next physical within 3 mo: Visit date not found  Prescriber OR PCP: Paula Weaver MD  Last diagnosis associated with med order: 1. Essential familial hypercholesterolemia  - atorvastatin (LIPITOR) 20 MG tablet [Pharmacy Med Name: ATORVASTATIN CALCIUM 20 MG Tablet]; TAKE 1 TABLET AT BEDTIME (YOU ARE OVERDUE FOR A CLINIC VISIT, CALL 24/7)  Dispense: 30 tablet; Refill: 0    2. Essential hypertension  - lisinopriL (PRINIVIL,ZESTRIL) 20 MG tablet [Pharmacy Med Name: LISINOPRIL 20 MG Tablet]; TAKE 1 TABLET EVERY DAY (CALL 24/7 TO SCHEDULE YOUR FOLLOW UP VISIT AND LABS)  Dispense: 30 tablet; Refill: 0    If protocol passes may refill for 12 months if within 3 months of last provider visit (or a total of 15 months).                lisinopriL (PRINIVIL,ZESTRIL) 20 MG tablet [Pharmacy Med Name: LISINOPRIL 20 MG Tablet]  30 tablet 0     Sig: TAKE 1 TABLET EVERY DAY (CALL 24/7 TO SCHEDULE YOUR FOLLOW UP VISIT AND LABS)       Ace Inhibitors Refill Protocol Failed - 9/12/2020 10:49 AM        Failed - PCP or prescribing provider visit in past 12 months       Last office visit with prescriber/PCP: Visit date not found OR same dept: Visit date not found OR same specialty: 6/13/2019 Melanie Broussard MD  Last physical: 1/14/2019 Last MTM visit: Visit date not found   Next visit within 3 mo: Visit date not found  Next physical within 3 mo: Visit date not found  Prescriber OR PCP: Paula Weaver MD  Last diagnosis associated with med order: 1. Essential familial hypercholesterolemia  - atorvastatin (LIPITOR) 20 MG tablet [Pharmacy Med Name: ATORVASTATIN CALCIUM 20 MG Tablet]; TAKE 1 TABLET AT BEDTIME (YOU ARE OVERDUE FOR A CLINIC VISIT, CALL 24/7)  Dispense: 30 tablet; Refill: 0    2. Essential hypertension  - lisinopriL (PRINIVIL,ZESTRIL) 20 MG tablet [Pharmacy Med Name: LISINOPRIL 20 MG Tablet]; TAKE 1 TABLET EVERY DAY (CALL 24/7 TO SCHEDULE YOUR FOLLOW UP VISIT AND LABS)  Dispense: 30 tablet; Refill: 0    If protocol passes may refill for 12 months if within 3 months of last provider visit (or a total of 15 months).             Failed - Serum Potassium in past 12 months     No results found for: LN-POTASSIUM          Failed - Blood pressure filed in past 12 months     BP Readings from Last 1 Encounters:   06/20/19 125/81             Failed - Serum Creatinine in past 12 months     Creatinine   Date Value Ref Range Status   01/16/2019 0.77 0.60 - 1.10 mg/dL Final

## 2021-06-11 NOTE — PROGRESS NOTES
Assessment and Plan:     Patient has been advised of split billing requirements and indicates understanding: Yes  Nika was seen today for annual wellness visit.    Encounter for general adult medical examination with abnormal findings  Immunizations reviewed --- declined all immunization   Colon cancer screen - normal FIT 2019, repeat this year    Mammography reviewed--normal 2019, repeat next year as she wants to hold off for right now due to covid19  Pap reviewed --  no need to repeat based on age and risk factors.   Routine Dental and Eye care recommended  Discussed importance of regular exercise and appropriate calcium intake  Discussed Advance Directives--patient has form at home but doesn't want to fill out right now    -     HM2(CBC w/o Differential)    Mild major depression (H)  Worsening but her report. Advised her to take 30 mg in total instead of 20 mg as she has been doing. She can take all of them at night as they make her sleepy  -     FLUoxetine (PROZAC) 10 MG capsule; Take 3 capsules (30 mg total) by mouth at bedtime.    Essential Hypercholesterolemia  -     Lipid Cascade FASTING    Essential hypertension  Stable. No side effects from meds.   -     Comprehensive Metabolic Panel    History of colonic polyps  Screen for colon cancer  Doesn't want to do colonoscopy. FIT ordered again for her.    -     Occult Blood(ICT)    Vitamin D deficiency  -     Vitamin D, Total (25-Hydroxy)    Osteopenia after menopause  DEXA next year.       The patient's current medical problems were reviewed.    I have had an Advance Directives discussion with the patient.  The following health maintenance schedule was reviewed with the patient and provided in printed form in the after visit summary:   Health Maintenance   Topic Date Due     DEPRESSION ACTION PLAN  1945     ZOSTER VACCINES (1 of 2) 11/01/1994     PNEUMOCOCCAL IMMUNIZATION 65+ LOW/MEDIUM RISK (1 of 2 - PCV13) 11/01/2009     MEDICARE ANNUAL WELLNESS  VISIT  01/14/2020     FALL RISK ASSESSMENT  01/14/2020     COLORECTAL CANCER SCREENING  01/28/2020     INFLUENZA VACCINE RULE BASED (1) 08/01/2020     TD 18+ HE  06/02/2021     DXA SCAN  01/22/2021     ADVANCE CARE PLANNING  01/14/2024     LIPID  01/16/2024     HEPATITIS B VACCINES  Aged Out        Subjective:   Chief Complaint: Nika Navarrete is an 74 y.o. female here for an Annual Wellness visit.   HPI:  Patient is fasting. Has concern about splitting fluoxetine as she feels that it causes her to be sleepy. She's been taking milligrams 2 times a day instead of 3 capsules daily to equal 30 mg.  She elected to do occult blood screen again for colon cancer screening.  She wants to put mammogram on hold this year due to COVID-19.  She declines all immunization.  She did not take her blood pressure medication this morning.    Review of Systems:  Please see above.  The rest of the review of systems are negative for all systems.    Patient Care Team:  Paula Weaver MD as PCP - General (Family Medicine)  Paula Weaver MD as Assigned PCP     Patient Active Problem List   Diagnosis     Essential Hypercholesterolemia     Overweight     Lump Or Mass In Breast     Hypertension     Mild major depression (H)     History of colonic polyps     Past Medical History:   Diagnosis Date     ASCUS on Pap smear 2/16/2009    Overview:  1/30/09:Pap--ASCUS, + HPV 66 (high risk). Recommend colposcopy. 3/4/09:Colpo--cervix hard to visualized; stenotic. ECC taken--negative--repeat Pap 6 months 1/5/2010:Pap--NIL. Repap 6 months. 8/5/10:pap--NIL. Repeat pap 1 year. 1/10/12:Pap--NIL. Rpt in 1 yr. Reminder sent to system      Past Surgical History:   Procedure Laterality Date     AUGMENTATION MAMMAPLASTY  2005    same time as bilateral reduction states pt.     FACIAL COSMETIC SURGERY       CO ENLARGE BREAST      Description: Breast Surgery Enlargement Procedure;  Recorded: 09/02/2014;     REDUCTION MAMMAPLASTY Bilateral 2005     bilateral reduction     tummy tuck        Family History   Problem Relation Age of Onset     Breast cancer Mother 50     Breast cancer Maternal Aunt      Breast cancer Paternal Aunt      Ovarian cancer Daughter       Social History     Socioeconomic History     Marital status:      Spouse name: Not on file     Number of children: Not on file     Years of education: Not on file     Highest education level: Not on file   Occupational History     Not on file   Social Needs     Financial resource strain: Not on file     Food insecurity     Worry: Not on file     Inability: Not on file     Transportation needs     Medical: Not on file     Non-medical: Not on file   Tobacco Use     Smoking status: Former Smoker     Smokeless tobacco: Never Used     Tobacco comment: quit 2010   Substance and Sexual Activity     Alcohol use: No     Frequency: Never     Drug use: No     Sexual activity: Never   Lifestyle     Physical activity     Days per week: Not on file     Minutes per session: Not on file     Stress: Not on file   Relationships     Social connections     Talks on phone: Not on file     Gets together: Not on file     Attends Latter day service: Not on file     Active member of club or organization: Not on file     Attends meetings of clubs or organizations: Not on file     Relationship status: Not on file     Intimate partner violence     Fear of current or ex partner: Not on file     Emotionally abused: Not on file     Physically abused: Not on file     Forced sexual activity: Not on file   Other Topics Concern     Not on file   Social History Narrative    . Does have a significant other although doesn't live with him    Lives alone.    Has 3 children. Has 7 grandchildren.    Watches TV, plays pickle ball. 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       Current Outpatient Medications   Medication Sig Dispense Refill     acetaminophen (TYLENOL) 500 MG tablet Take 500 mg by  "mouth every 6 (six) hours as needed for pain.       atorvastatin (LIPITOR) 20 MG tablet Take 1 tablet (20 mg total) by mouth at bedtime. 90 tablet 0     CALCIUM CARBONATE (ANTACID CALCIUM ORAL) Take by mouth.       diphenhydrAMINE-acetaminophen (TYLENOL PM)  mg Tab Take 2 tablets by mouth at bedtime.  0     FLUoxetine (PROZAC) 10 MG capsule Take 3 capsules (30 mg total) by mouth at bedtime. 270 capsule 0     GREEN TEA EXTRACT ORAL Take by mouth.       hydrocortisone (PROCTOZONE-HC) 2.5 % rectal cream Insert into the rectum 2 (two) times a day. (Patient taking differently: Insert into the rectum 2 (two) times a day as needed.       ) 30 g 9     lisinopriL (PRINIVIL,ZESTRIL) 20 MG tablet Take 1 tablet (20 mg total) by mouth daily. 90 tablet 0     buPROPion (WELLBUTRIN XL) 150 MG 24 hr tablet TAKE 1 TABLET (150 MG TOTAL) BY MOUTH EVERY MORNING. 90 tablet 0     No current facility-administered medications for this visit.       Objective:   Vital Signs:   Visit Vitals  BP (!) 146/96   Pulse 67   Temp 98.1  F (36.7  C) (Oral)   Resp 18   Ht 5' 2.5\" (1.588 m)   Wt 164 lb 6.4 oz (74.6 kg)   BMI 29.59 kg/m           VisionScreening:  No exam data present     PHYSICAL EXAM  BP (!) 146/96   Pulse 67   Temp 98.1  F (36.7  C) (Oral)   Resp 18   Ht 5' 2.5\" (1.588 m)   Wt 164 lb 6.4 oz (74.6 kg)   BMI 29.59 kg/m      General Appearance:    Alert, cooperative, no distress, appears stated age   Head:    Normocephalic, without obvious abnormality, atraumatic                   Neck:   Supple, symmetrical, trachea midline, no adenopathy;     thyroid:  no enlargement/tenderness/nodules; no carotid    bruit   Back:     Symmetric, no curvature, ROM normal, no CVA tenderness   Lungs:     Clear to auscultation bilaterally, respirations unlabored   Chest Wall:    No tenderness or deformity    Heart:    Regular rate and rhythm, S1 and S2 normal, no murmur, rub    or gallop   Breast Exam:    No tenderness, masses, or nipple " abnormality   Abdomen:     Soft, non-tender, bowel sounds active all four quadrants,     no masses, no organomegaly   Genitalia:   Not performed   Rectal:   Not performed    Extremities:   Extremities normal, atraumatic, no cyanosis or edema. Left ankle bulge (due for previous ankle replacement surgery)       Skin:   Skin color, texture, turgor normal, no rashes or lesions   Lymph nodes:   Cervical, supraclavicular, and axillary nodes normal   Neurologic:   CNII-XII intact, normal strength and sensation         Assessment Results 9/18/2020   Activities of Daily Living No help needed   Instrumental Activities of Daily Living No help needed   Mini Cog Total Score 4   Some recent data might be hidden     A Mini-Cog score of 0-2 suggests the possibility of dementia, score of 3-5 suggests no dementia    Identified Health Risks:     She is at risk for lack of exercise and has been provided with information to increase physical activity for the benefit of her well-being.  The patient was counseled and encouraged to consider modifying their diet and eating habits. She was provided with information on recommended healthy diet options.  The patient was provided with written information regarding signs of hearing loss.  Information on urinary incontinence and treatment options given to patient.  Information regarding advance directives (living [place]), including where she can download the appropriate form, was provided to the patient via the AVS.

## 2021-06-13 NOTE — PROGRESS NOTES
"SUBJECTIVE:   Chief Complaint   Patient presents with     Annual Exam     pt not fasting today     Nika Navarrete 71 y.o. female    Current Outpatient Prescriptions   Medication Sig Dispense Refill     acetaminophen (TYLENOL) 500 MG tablet Take 500 mg by mouth every 6 (six) hours as needed for pain.       atorvastatin (LIPITOR) 20 MG tablet TAKE 1 TABLET AT BEDTIME 90 tablet 0     CALCIUM CARBONATE (ANTACID CALCIUM ORAL) Take by mouth.       diphenhydrAMINE-acetaminophen (TYLENOL PM)  mg Tab Take 1 tablet by mouth bedtime as needed.       FLUoxetine (PROZAC) 10 MG capsule Take 2 capsules (20 mg total) by mouth daily. (Patient taking differently: Take 10 mg by mouth daily. ) 180 capsule 3     GREEN TEA EXTRACT ORAL Take by mouth.       hydrocortisone (PROCTOZONE-HC) 2.5 % rectal cream USE AS DIRECTED 30 g 2     lisinopril (PRINIVIL,ZESTRIL) 20 MG tablet TAKE 1 TABLET EVERY DAY 90 tablet 0     No current facility-administered medications for this visit.      Allergies: Codeine   No LMP recorded. Patient is postmenopausal.    HPI:   Pleasant 71-year-old here for annual physical and med check.  No concerns.  Refuses immunizations.  Refuses colonoscopy due to prep.  She is not a candidate for cologuard due to past history of polyp.  She has been completing annual Hemoccult for colon screening.  Mammogram up-to-date.  Bone density scan up-to-date.    Patient continues on her atorvastatin 20 mg daily for hyperlipidemia, Prozac 10 mg daily for mood symptoms, and lisinopril 20 mg daily for hypertension.     ROS: as per HPI    OBJECTIVE:   /78  Pulse 60  Temp 98.4  F (36.9  C) (Oral)   Resp 16  Ht 5' 2.75\" (1.594 m)  Wt 170 lb 9.6 oz (77.4 kg)  BMI 30.46 kg/m2    GENERAL:  Pleasant, well-appearing woman in no acute distress.  VITAL SIGNS:  Reviewed.  HEENT:  Pupils are equal, round, and reactive to light.  Sclerae and  conjunctivae clear.  TMs are clear bilaterally, although right external auditory canal " is partially obstructed with cerumen and so right TM is not completely visualized.  Oropharynx is clear with  moist mucous membranes.  NECK:  Supple without lymphadenopathy or thyromegaly.  No carotid bruits.  CARDIOVASCULAR:  Heart regular rate and rhythm without murmur.  Normal S1 and  S2.  LUNGS:  Clear to auscultation bilaterally without wheezes or crackles.  Good  air movement throughout.  BREASTS:  Normal contours.  Well-healed scars from reduction surgery.  No skin dimpling, nipple retraction or nipple  discharge.  Palpation reveals no masses, no supraclavicular or axillary  lymphadenopathy.    ABDOMEN:  Soft, nontender, and nondistended without  guarding or rebound.  No organomegaly.  PELVIS: deferred.  EXTREMITIES:  Warm and well perfused without edema. Dorsalis pedis pulses easily palable and symmetric bilaterally.  NEURO: Alert and oriented. Grossly nonfocal.  PSYCHIATRIC: Presents on time and well groomed.  Normal speach and thought content.  Full affect.  No abnormal movements or behaviors noted.        ASSESSMENT/PLAN  1. HTN (hypertension)  BMP.  Appears well-controlled.  Continue current medication regimen.  - Basic Metabolic Panel    2. Colon cancer screening  She refuses colonoscopy or anything that would require prep.  As has been done in the past, discussed increased risk for colon cancer in light of previous polyp, and my recommendation that she have colonoscopy which would be able to diagnose an earlier stage colon cancer than would otherwise present on its own.  She completely refuses.  She would like to continue with annual Hemoccult, and understands this is not as reliable of a screening test.  - Occult Blood, Fecal     3.  Hyperlipidemia  Nonfasting lipids today for monitoring.    4.  Annual physical  Colon screening with Hemoccult as above.  Mammogram up-to-date.  Bone density scan up-to-date.  She refuses immunizations    5.  Mood symptoms  Continue with fluoxetine 10 mg daily.  She gets  good effect with this dose.  Higher doses caused grogginess.

## 2021-06-13 NOTE — TELEPHONE ENCOUNTER
Refill Approved    Rx renewed per Medication Renewal Policy. Medication was last renewed on 9/13/20.    Maile Carreon, [facility] [place] Connection Triage/Med Refill 12/16/2020     Requested Prescriptions   Pending Prescriptions Disp Refills     lisinopriL (PRINIVIL,ZESTRIL) 20 MG tablet 90 tablet 0     Sig: Take 1 tablet (20 mg total) by mouth daily.       Ace Inhibitors Refill Protocol Passed - 12/15/2020 10:12 AM        Passed - PCP or prescribing provider visit in past 12 months       Last office visit with prescriber/PCP: Visit date not found OR same dept: Visit date not found OR same specialty: 6/13/2019 Melanie Broussard MD  Last physical: 9/18/2020 Last MTM visit: Visit date not found   Next visit within 3 mo: Visit date not found  Next physical within 3 mo: Visit date not found  Prescriber OR PCP: Paula Weaver MD  Last diagnosis associated with med order: 1. Essential hypertension  - lisinopriL (PRINIVIL,ZESTRIL) 20 MG tablet; Take 1 tablet (20 mg total) by mouth daily.  Dispense: 90 tablet; Refill: 0    2. Essential familial hypercholesterolemia  - atorvastatin (LIPITOR) 20 MG tablet; Take 1 tablet (20 mg total) by mouth at bedtime.  Dispense: 90 tablet; Refill: 0    If protocol passes may refill for 12 months if within 3 months of last provider visit (or a total of 15 months).             Passed - Serum Potassium in past 12 months     Lab Results   Component Value Date    Potassium 4.5 09/18/2020             Passed - Blood pressure filed in past 12 months     BP Readings from Last 1 Encounters:   09/18/20 (!) 146/96             Passed - Serum Creatinine in past 12 months     Creatinine   Date Value Ref Range Status   09/18/2020 0.76 0.60 - 1.10 mg/dL Final                atorvastatin (LIPITOR) 20 MG tablet 90 tablet 0     Sig: Take 1 tablet (20 mg total) by mouth at bedtime.       Statins Refill Protocol (Hmg CoA Reductase Inhibitors) Passed - 12/15/2020 10:12 AM        Passed - PCP or prescribing provider  visit in past 12 months      Last office visit with prescriber/PCP: Visit date not found OR same dept: Visit date not found OR same specialty: 6/13/2019 Melanie Broussard MD  Last physical: 9/18/2020 Last MTM visit: Visit date not found   Next visit within 3 mo: Visit date not found  Next physical within 3 mo: Visit date not found  Prescriber OR PCP: Paula Weaver MD  Last diagnosis associated with med order: 1. Essential hypertension  - lisinopriL (PRINIVIL,ZESTRIL) 20 MG tablet; Take 1 tablet (20 mg total) by mouth daily.  Dispense: 90 tablet; Refill: 0    2. Essential familial hypercholesterolemia  - atorvastatin (LIPITOR) 20 MG tablet; Take 1 tablet (20 mg total) by mouth at bedtime.  Dispense: 90 tablet; Refill: 0    If protocol passes may refill for 12 months if within 3 months of last provider visit (or a total of 15 months).

## 2021-06-16 NOTE — TELEPHONE ENCOUNTER
Telephone Encounter by Vilma Erickson at 6/24/2019  3:13 PM     Author: Vilma Erickson Service: -- Author Type: --    Filed: 6/24/2019  3:13 PM Encounter Date: 6/21/2019 Status: Signed    : Vilma Erickson APPROVED:    Approval start date:06/24/2019  Approval end date:06/24/2020    Pharmacy has been notified of approval and will contact patient when medication is ready for pickup.

## 2021-06-17 NOTE — PATIENT INSTRUCTIONS - HE
Patient Instructions by Paula Weaver MD at 1/14/2019 12:50 PM     Author: Paula Weaver MD Service: -- Author Type: Physician    Filed: 1/14/2019  1:01 PM Encounter Date: 1/14/2019 Status: Signed    : Paula Weaver MD (Physician)         Patient Education     Exercise for a Healthier Heart  You may wonder how you can improve the health of your heart. If youre thinking about exercise, youre on the right track. You dont need to become an athlete, but you do need a certain amount of brisk exercise to help strengthen your heart. If you have been diagnosed with a heart condition, your doctor may recommend exercise to help stabilize your condition. To help make exercise a habit, choose safe, fun activities.       Be sure to check with your health care provider before starting an exercise program.    Why exercise?  Exercising regularly offers many healthy rewards. It can help you do all of the following:    Improve your blood cholesterol levels to help prevent further heart trouble    Lower your blood pressure to help prevent a stroke or heart attack    Control diabetes, or reduce your risk of getting this disease    Improve your heart and lung function    Reach and maintain a healthy weight    Make your muscles stronger and more limber so you can stay active    Prevent falls and fractures by slowing the loss of bone mass (osteoporosis)    Manage stress better  Exercise tips  Ease into your routine. Set small goals. Then build on them.  Exercise on most days. Aim for a total of 150 or more minutes of moderate to  vigorous intensity activity each week. Consider 40 minutes, 3 to 4 times a week. For best results, activity should last for 40 minutes on average. It is OK to work up to the 40 minute period over time. Examples of moderate-intensity activity is walking one mile in 15 minutes or 30 to 45 minutes of yard work.  Step up your daily activity level. Along with your exercise program, try being more  active throughout the day. Walk instead of drive. Do more household tasks or yard work.  Choose one or more activities you enjoy. Walking is one of the easiest things you can do. You can also try swimming, riding a bike, or taking an exercise class.  Stop exercising and call your doctor if you:    Have chest pain or feel dizzy or lightheaded    Feel burning, tightness, pressure, or heaviness in your chest, neck, shoulders, back, or arms    Have unusual shortness of breath    Have increased joint or muscle pain    Have palpitations or an irregular heartbeat      7947-9686 [employer]. [address] [place] [address]. All rights reserved. This information is not intended as a substitute for professional medical care. Always follow your healthcare professional's instructions.         Patient Education   Signs of Hearing Loss  Hearing loss is a problem shared by many people. In fact, it is one of the most common health conditions, particularly as people age. Most people over age 65 have some hearing loss, and by age 80, almost everyone does. Because hearing loss usually occurs slowly over the years, you may not realize your hearing ability has gotten worse.       Have your hearing checked  Contact your [facility] care provider if you:    Have to strain to hear normal conversation.    Have to watch other peoples faces very carefully to follow what theyre saying.    Need to ask people to repeat what theyve said.    Often misunderstand what people are saying.    Turn the volume of the television or radio up so high that others complain.    Feel that people are mumbling when theyre talking to you.    Find that the effort to hear leaves you feeling tired and irritated.    Notice, when using the phone, that you hear better with 1 ear than the other.    5239-3270 [employer]. [address] [place] [address]. All rights reserved. This information is not intended as a substitute for  professional medical care. Always follow your healthcare professional's instructions.         Patient Education   Urinary Incontinence, Female (Adult)  Urinary incontinence means loss of control of the bladder. This problem affects many women, especially as they get older. If you have incontinence, you may be embarrassed to ask for help. But know that this problem can be treated.  Types of Incontinence  There are different types of incontinence. Two of the main types are described here. You can have more than one type.    Stress incontinence. With this type, urine leaks when pressure (stress) is put on the bladder. This may happen when you cough, sneeze, or laugh. Stress incontinence most often occurs because the pelvic floor muscles that support the bladder and urethra are weak. This can happen after pregnancy and vaginal childbirth or a hysterectomy. It can also be due to excess body weight or hormone changes.    Urge incontinence (also called overactive bladder). With this type, a sudden urge to urinate is felt often. This may happen even though there may not be much urine in the bladder. The need to urinate often during the night is common. Urge incontinence most often occurs because of bladder spasms. This may be due to bladder irritation or infection. Damage to bladder nerves or pelvic muscles, constipation, and certain medicines can also lead to urge incontinence.  Treatment of urinary incontinence depends on the cause. Further evaluation is needed to find the type you have. This will likely include an exam and certain tests. Based on the results, you and your healthcare provider can then plan treatment. Until a diagnosis is made, the home care tips below can help relieve symptoms.  Home care    Do pelvic floor muscle exercises, if they are prescribed. The pelvic floor muscles help support the bladder and urethra. Many women find that their symptoms improve when doing special exercises that strengthen these  muscles. To do the exercises contract the muscles you would use to stop your stream of urine, but do this when youre not urinating. Hold for 10 seconds, then relax. Repeat 10 to 20 times in a row, at least 3 times a day. Your provider may give you other instructions for how to do the exercises and how often.    Keep a bladder diary. This helps track how often and how much you urinate over a set period of time. Bring this diary with you to your next visit with the provider. The information can help your provider learn more about your bladder problem.    Lose weight, if advised to by your provider. Excess weight puts pressure on the bladder. Your provider can help you create a weight-loss plan thats right for you. This may include exercising more and making certain diet changes.    Don't consume foods and drinks that may irritate the bladder. These can include alcohol and caffeinated drinks.    Quit smoking. Smoking and other tobacco use can lead to chronic cough that strains the pelvic floor muscles. Smoking may also damage the bladder and urethra. Talk with your provider about treatments or methods you can use to quit smoking.    If drinking large amounts of fluid causes you to have symptoms, you may be advised to limit your fluid intake. You may also be advised to drink most of your fluids during the day and to limit fluids at night.    If youre worried about urine leakage or accidents, you may wear absorbent pads to catch urine. Change the pads often. This helps reduce discomfort. It may also reduce the risk of skin or bladder infections.  Follow-up care  Follow up with your healthcare provider, or as directed. It may take some to find the right treatment for your problem. Your treatment plan may include special therapies or medicines. Certain procedures or surgery may also be options. Be sure to discuss any questions you have with your provider.  When to seek medical advice  Call the healthcare provider right  away if any of these occur:    Fever of 100.4 F (38 C) or higher, or as directed by your provider    Bladder pain or fullness    Abdominal swelling    Nausea or vomiting    Back pain    Weakness, dizziness or fainting  Date Last Reviewed: 10/1/2017    6934-6295 [employer]. [address] [place] [address]86. All rights reserved. This information is not intended as a substitute for professional medical care. Always follow your healthcare professional's instructions.     Patient Education   Depression and Suicide in Older Adults  Nearly 2 million older Americans have some type of depression. Sadly, some of them even take their own lives. Yet depression among older adults is often ignored. Learn the warning signs. You may help spare a loved one needless pain. You may also save a life.       What Is Depression?  Depression is a mood disorder that affects the way you think and feel. The most common symptom is a feeling of deep sadness. People who are depressed also may seem tired and listless. And nothing seems to give them pleasure. Its normal to grieve or be sad sometimes. But sadness lessens or passes with time. Depression rarely goes away or improves on its own. Other symptoms of depression are:    Sleeping more or less than normal    Eating more or less than normal    Having headaches, stomachaches, or other pains that dont go away    Feeling nervous, empty, or worthless    Crying a great deal    Thinking or talking about suicide or death    Feeling confused or forgetful  What Causes It?  The causes of depression arent fully known. Certain chemicals in the brain play a role. Depression does run in families. And life stresses can also trigger depression in some people. That may be the case with older adults. They often face great burdens, such as the death of friends or a spouse. They may have failing health. And they are more likely to be alone, lonely, or poor.  How You Can Help  Often,  depressed people may not want to ask for help. When they do, they may be ignored. Or, they may receive the wrong treatment. You can help by showing parents and older friends love and support. If they seem depressed, help them find the right treatment. Talk to your doctor. Or contact a local mental health center, social service agency, or hospital. With modern treatment, no one has to suffer from depression.  Resources:    National [place] of Mental Health  982.434.9811  www.nimh.nih.gov    National [place] on Mental Illness  899.747.6541  www.tc.org    [facility]  353.438.4739  www.[facility].org    National Suicide Hotline  656.557.4440 (800-SUICIDE)      3497-6382 [employer]. [address] [place] [address]. All rights reserved. This information is not intended as a substitute for professional medical care. Always follow your healthcare professional's instructions.         Patient Education   Understanding Advance Care Planning  Advance care planning is the process of deciding ones own future medical care. It helps ensure that if you cant speak for yourself, your wishes can still be carried out. The plan is a series of legal documents that note a persons wishes. The documents vary by state. Advance care planning may be done when a person has a serious illness that is expected to get worse. It may be done before major surgery. And it can help you and your family be prepared in case of a major illness or injury. Advance care planning helps with making decisions at these times.       A health care proxy is a person who acts as the voice of a patient when the patient cant speak for himself or herself. The name of this role varies by state. It may be called a Durable Medical Power of  or Durable Power of  for Healthcare. It may be called an agent, surrogate, or advocate. Or it may be called a representative or decision maker. It is an official duty that is identified  by a legal document. The document also varies by state.    Why Is Advance Care Planning Important?  If a person communicates their healthcare wishes:    They will be given medical care that matches their values and goals.    Their family members will not be forced to make decisions in a crisis with no guidance.  Creating a Plan  Making an advance care plan is often done in 3 steps:    Thinking about ones wishes. To create an advance care plan, you should think about what kind of medical treatment you would want if you lose the ability to communicate. Are there any situations in which you would refuse or stop treatment? Are there therapies you would want or not want? And whom do you want to make decisions for you? There are many places to learn more about how to plan for your care. Ask your doctor or  for resources.    Picking a health care proxy. This means choosing a trusted person to speak for you only when you cant speak for yourself. When you cannot make medical decisions, your proxy makes sure the instructions in your advance care plan are followed. A proxy does not make decisions based on his or her own opinions. They must put aside those opinions and values if needed, and carry out your wishes.    Filling out the legal documents. There are several kinds of legal documents for advance care planning. Each one tells health care providers your wishes. The documents may vary by state. They must be signed and may need to be witnessed or notarized. You can cancel or change them whenever you wish. Depending on your state, the documents may include a Healthcare Proxy form, Living Will, Durable Medical Power of , Advance Directive, or others.  The Familys Role  The best help a family can give is to support their loved ones wishes. Open and honest communication is vital. Family should express any concerns they have about the patients choices while the patient can still make decisions.    8003-5927  [employer]. [address] [place] [address], [place] [address]. All rights reserved. This information is not intended as a substitute for professional medical care. Always follow your healthcare professional's instructions.         Also, [facility] offers a free, downloadable health care directive that allows you to share your treatment choices and personal preferences if you cannot communicate your wishes. It also allows you to appoint another person (called a health care agent) to make health care decisions if you are unable to do so. You can download an advance directive by going here: [email or web address]     Patient Education   Personalized Prevention Plan  You are due for the preventive services outlined below.  Your care team is available to assist you in scheduling these services.  If you have already completed any of these items, please share that information with your care team to update in your medical record.  Health Maintenance   Topic Date Due   ? ADVANCE DIRECTIVES DISCUSSED WITH PATIENT  11/01/1962   ? COLONOSCOPY  11/01/1994   ? ZOSTER VACCINES (1 of 2) 11/01/1994   ? PNEUMOCOCCAL POLYSACCHARIDE VACCINE AGE 65 AND OVER  11/01/2009   ? PNEUMOCOCCAL CONJUGATE VACCINE FOR ADULTS (PCV13 OR PREVNAR)  11/01/2009   ? DXA SCAN  12/01/2017   ? INFLUENZA VACCINE RULE BASED (1) 01/14/2019 (Originally 8/1/2018)   ? FALL RISK ASSESSMENT  01/14/2020   ? MAMMOGRAM  03/14/2020   ? TD 18+ HE  06/02/2021

## 2021-06-17 NOTE — TELEPHONE ENCOUNTER
Telephone Encounter by Cris Azul at 7/28/2020  2:45 PM     Author: Cris Azul Service: -- Author Type: --    Filed: 7/28/2020  2:45 PM Encounter Date: 7/26/2020 Status: Signed    : Cris Azul APPROVED:    Approval start date: 7/28/2020  Approval end date:  12/31/2020    Pharmacy has been notified of approval and will contact patient when medication is ready for pickup.

## 2021-06-18 NOTE — LETTER
Letter by Paula Weaver MD at      Author: Paula Weaver MD Service: -- Author Type: --    Filed:  Encounter Date: 1/24/2019 Status: (Other)       Nika Navarrete  [address] [address]             January 24, 2019         Dear Ms. Navarrete,    Below are the results from your recent visit:    Resulted Orders   DXA Bone Density Scan    Narrative    1/22/2019      RE: Nika Navarrete  YOB: 1945        Dear Paula Weaver,    Patient Profile:  73 y.o. female, postmenopausal, is here for the follow up bone density   test.   History of fractures - Yes;  Tibia and Fibula at age 20. Family history of   osteoporosis - Yes;  grandparent.  Family history of hip fracture: None.   Smoking history - No. Osteoporosis treatment past -  Yes;  HRT.   Osteoporosis treatment current - No.  Chronic medical problems - None.   High risk medications -  Blood thinner (Coumadin or Heparin);  Yes, in the   Past.      Assessment:    1. The spine bone density L1-L4(L2-L3) with T-score 1.3.  Of note,   significant sclerotic changes are noted in the lumbar vertebrae, which   artifactually elevate the bone mineral density.  2. Femoral bone densities show left femoral neck T- score -0.4 and right   femoral neck T-score -0.4.  3. Trabecular bone score indicates moderate trabecular bone architecture.  4.  Left one third radius T score -1.4.    73 y.o. female with LOW BONE DENSITY (OSTEOPENIA) and LOW fracture risk,   adjusted for the TBS, with major osteoporotic fracture risk 9.0 % and hip   fracture risk 1.0 %.     Since the previous bone density dated  December 1, 2015, there has been a    5.7 % increase in the bone density of the spine.  This is likely due to   the interval development of sclerotic changes which have artifactually   elevated the bone mineral density.  Additionally there has been no   statistically significant  change in the hips bilaterally.  There has been   a 7.3% decline in the  left one third radius.    Recommendations:  Appropriate calcium, vitamin D supplements, along with balance and weight   bearing exercise , are recommended with follow up bone density scan in 2   years.      Bone densitometry was performed on your patient using our [employer] iDXA   densitometer. The results are summarized and a copy of the actual scans   are included for your review. In conformity with the International Society   of Clinical Densitometry's most recent position statement for DXA   interpretation (2015), the diagnosis will be made on the lowest measured   T-score of the lumbar spine, femoral neck, total proximal femur or 33%   radius. Note the change in terminology for diagnostic classification from   OSTEOPENIA to LOW BONE MASS. All trending for sequential exams will be   done using multiple vertebrae or the total proximal femur. Fracture risk   is based on the WHO Fracture Risk Assessment Tool (FRAX). If additional   information is needed or if you would like to discuss the results, please   do not hesitate to call me.       Thank you for referring this patient to [facility].   We are happy to be of service in support of you and your practice. If you   have any questions or suggestions to improve our service, please call me   at [phone].     Sincerely,     Aylin Nickerson M.D. C.C.D.  Osteoporosis Services, [facility]       Nika here is the report of your bone density. There's osteopenia or low bone density however fracture risk is low. No need for medication however do recommend calcium and vitamin D supplementation as well as weight bearing exercises to decrease your risk of developing osteoporosis.  Need to repeat this in 2 years.    Please call with questions or contact us using [employer].    Sincerely,        Electronically signed by Paula Weaver MD

## 2021-06-18 NOTE — LETTER
Letter by Paula Weaver MD at      Author: Paula Weaver MD Service: -- Author Type: --    Filed:  Encounter Date: 1/16/2019 Status: (Other)       Nika Navarrete  [address]  [facility] [address]             January 16, 2019         Dear Ms. Navarrete,    Below are the results from your recent visit:    Resulted Orders   Lipid Cascade, FASTING   Result Value Ref Range    Cholesterol 163 <=199 mg/dL    Triglycerides 98 <=149 mg/dL    HDL Cholesterol 58 >=50 mg/dL    LDL Calculated 85 <=129 mg/dL    Patient Fasting > 8hrs? Yes    Comprehensive Metabolic Panel   Result Value Ref Range    Sodium 141 136 - 145 mmol/L    Potassium 4.4 3.5 - 5.0 mmol/L    Chloride 107 98 - 107 mmol/L    CO2 24 22 - 31 mmol/L    Anion Gap, Calculation 10 5 - 18 mmol/L    Glucose 100 70 - 125 mg/dL    BUN 20 8 - 28 mg/dL    Creatinine 0.77 0.60 - 1.10 mg/dL    GFR MDRD Af Amer >60 >60 mL/min/1.73m2    GFR MDRD Non Af Amer >60 >60 mL/min/1.73m2    Bilirubin, Total 0.5 0.0 - 1.0 mg/dL    Calcium 10.5 8.5 - 10.5 mg/dL    Protein, Total 7.0 6.0 - 8.0 g/dL    Albumin 4.2 3.5 - 5.0 g/dL    Alkaline Phosphatase 79 45 - 120 U/L    AST 16 0 - 40 U/L    ALT 15 0 - 45 U/L    Narrative    Fasting Glucose reference range is 70-99 mg/dL per  American Diabetes Association (ADA) guidelines.       Nika,  Here's your lab result.  Cholesterol levels are a little bit higher compared to last year. I don't think we need to adjust your statin mediation however I recommend healthier diet low in carbohydrates (rice, pasta, bread, potatoes, sugar, flour) to help with your levels. We can check this again in a year.  Liver and kidney function normal.     If you have any question please let me know.    Please call with questions or contact us using [employer].    Sincerely,        Electronically signed by Paula Weaver MD

## 2021-06-18 NOTE — PATIENT INSTRUCTIONS - HE
Patient Instructions by Paula Weaver MD at 9/18/2020 10:30 AM     Author: Paula Weaver MD Service: -- Author Type: Physician    Filed: 9/18/2020 10:56 AM Encounter Date: 9/18/2020 Status: Addendum    : Paula Weaver MD (Physician)    Related Notes: Original Note by Paula Weaver MD (Physician) filed at 9/18/2020 10:44 AM       Start taking prozac (fluoxetine) 30 mg at night time.  Continue taking bupropion 150 mg in the morning.    Patient Education     Exercise for a Healthier Heart  You may wonder how you can improve the health of your heart. If youre thinking about exercise, youre on the right track. You dont need to become an athlete, but you do need a certain amount of brisk exercise to help strengthen your heart. If you have been diagnosed with a heart condition, your doctor may recommend exercise to help stabilize your condition. To help make exercise a habit, choose safe, fun activities.       Be sure to check with your health care provider before starting an exercise program.    Why exercise?  Exercising regularly offers many healthy rewards. It can help you do all of the following:    Improve your blood cholesterol levels to help prevent further heart trouble    Lower your blood pressure to help prevent a stroke or heart attack    Control diabetes, or reduce your risk of getting this disease    Improve your heart and lung function    Reach and maintain a healthy weight    Make your muscles stronger and more limber so you can stay active    Prevent falls and fractures by slowing the loss of bone mass (osteoporosis)    Manage stress better  Exercise tips  Ease into your routine. Set small goals. Then build on them.  Exercise on most days. Aim for a total of 150 or more minutes of moderate to  vigorous intensity activity each week. Consider 40 minutes, 3 to 4 times a week. For best results, activity should last for 40 minutes on average. It is OK to work up to the 40 minute period over  time. Examples of moderate-intensity activity is walking one mile in 15 minutes or 30 to 45 minutes of yard work.  Step up your daily activity level. Along with your exercise program, try being more active throughout the day. Walk instead of drive. Do more household tasks or yard work.  Choose one or more activities you enjoy. Walking is one of the easiest things you can do. You can also try swimming, riding a bike, or taking an exercise class.  Stop exercising and call your doctor if you:    Have chest pain or feel dizzy or lightheaded    Feel burning, tightness, pressure, or heaviness in your chest, neck, shoulders, back, or arms    Have unusual shortness of breath    Have increased joint or muscle pain    Have palpitations or an irregular heartbeat      0529-2556 [employer]. [address] [place] [address]. All rights reserved. This information is not intended as a substitute for professional medical care. Always follow your healthcare professional's instructions.         Patient Education   Understanding [employer] MyPlate  The USDA (US Department of Agriculture) has guidelines to help you make healthy food choices. These are called MyPlate. MyPlate shows the food groups that make up healthy meals using the image of a place setting. Before you eat, think about the healthiest choices for what to put onto your plate or into your cup or bowl. To learn more about building a healthy plate, visit www.choosemyplate.gov.       The Food Groups    Fruits: Any fruit or 100% fruit juice counts as part of the Fruit Group. Fruits may be fresh, canned, frozen, or dried, and may be whole, cut-up, or pureed. Make half your plate fruits and vegetables.    Vegetables: Any vegetable or 100% vegetable juice counts as a member of the Vegetable Group. Vegetables may be fresh, frozen, canned, or dried. They can be served raw or cooked and may be whole, cut-up, or mashed. Make half your plate fruits and  vegetables.     Grains: All foods made from grains are part of the Grains Group. These include wheat, rice, oats, cornmeal, and barley such as bread, pasta, oatmeal, cereal, tortillas, and grits. Grains should be no more than a quarter of your plate. At least half of your grains should be whole grains.    Protein: This group includes meat, poultry, seafood, beans and peas, eggs, processed soy products (like tofu), nuts (including nut butters), and seeds. Make protein choices no more than a quarter of your plate. Meat and poultry choices should be lean or low fat.    Dairy: All fluid milk products and foods made from milk that contain calcium, like yogurt and cheese are part of the Dairy Group. (Foods that have little calcium, such as cream, butter, and cream cheese, are not part of the group.) Most dairy choices should be low-fat or fat-free.    Oils: These are fats that are liquid at room temperature. They include canola, corn, olive, soybean, and sunflower oil. Foods that are mainly oil include mayonnaise, certain salad dressings, and soft margarines. You should have only 5 to 7 teaspoons of oils a day. You probably already get this much from the food you eat.  Use Mindframe to Help Build Your Meals  The SuperTracker can help you plan and track your meals and activity. You can look up individual foods to see or compare their nutritional value. You can get guidelines for what and how much you should eat. You can compare your food choices. And you can assess personal physical activities and see ways you can improve. Go to www.choosemyplate.gov/supertracker/.    1246-0121 [employer]. [address] [place] [address], [place] [address]. All rights reserved. This information is not intended as a substitute for professional medical care. Always follow your healthcare professional's instructions.           Patient Education   Signs of Hearing Loss  Hearing loss is a problem shared by many people. In fact, it  is one of the most common health conditions, particularly as people age. Most people over age 65 have some hearing loss, and by age 80, almost everyone does. Because hearing loss usually occurs slowly over the years, you may not realize your hearing ability has gotten worse.       Have your hearing checked  Contact your [facility] care provider if you:    Have to strain to hear normal conversation.    Have to watch other peoples faces very carefully to follow what theyre saying.    Need to ask people to repeat what theyve said.    Often misunderstand what people are saying.    Turn the volume of the television or radio up so high that others complain.    Feel that people are mumbling when theyre talking to you.    Find that the effort to hear leaves you feeling tired and irritated.    Notice, when using the phone, that you hear better with 1 ear than the other.    9224-9158 [employer]. [address] [place] [address]. All rights reserved. This information is not intended as a substitute for professional medical care. Always follow your healthcare professional's instructions.         Patient Education   Urinary Incontinence, Female (Adult)  Urinary incontinence means loss of control of the bladder. This problem affects many women, especially as they get older. If you have incontinence, you may be embarrassed to ask for help. But know that this problem can be treated.  Types of Incontinence  There are different types of incontinence. Two of the main types are described here. You can have more than one type.    Stress incontinence. With this type, urine leaks when pressure (stress) is put on the bladder. This may happen when you cough, sneeze, or laugh. Stress incontinence most often occurs because the pelvic floor muscles that support the bladder and urethra are weak. This can happen after pregnancy and vaginal childbirth or a hysterectomy. It can also be due to excess body weight or hormone  changes.    Urge incontinence (also called overactive bladder). With this type, a sudden urge to urinate is felt often. This may happen even though there may not be much urine in the bladder. The need to urinate often during the night is common. Urge incontinence most often occurs because of bladder spasms. This may be due to bladder irritation or infection. Damage to bladder nerves or pelvic muscles, constipation, and certain medicines can also lead to urge incontinence.  Treatment of urinary incontinence depends on the cause. Further evaluation is needed to find the type you have. This will likely include an exam and certain tests. Based on the results, you and your healthcare provider can then plan treatment. Until a diagnosis is made, the home care tips below can help relieve symptoms.  Home care    Do pelvic floor muscle exercises, if they are prescribed. The pelvic floor muscles help support the bladder and urethra. Many women find that their symptoms improve when doing special exercises that strengthen these muscles. To do the exercises contract the muscles you would use to stop your stream of urine, but do this when youre not urinating. Hold for 10 seconds, then relax. Repeat 10 to 20 times in a row, at least 3 times a day. Your provider may give you other instructions for how to do the exercises and how often.    Keep a bladder diary. This helps track how often and how much you urinate over a set period of time. Bring this diary with you to your next visit with the provider. The information can help your provider learn more about your bladder problem.    Lose weight, if advised to by your provider. Excess weight puts pressure on the bladder. Your provider can help you create a weight-loss plan thats right for you. This may include exercising more and making certain diet changes.    Don't consume foods and drinks that may irritate the bladder. These can include alcohol and caffeinated drinks.    Quit  smoking. Smoking and other tobacco use can lead to chronic cough that strains the pelvic floor muscles. Smoking may also damage the bladder and urethra. Talk with your provider about treatments or methods you can use to quit smoking.    If drinking large amounts of fluid causes you to have symptoms, you may be advised to limit your fluid intake. You may also be advised to drink most of your fluids during the day and to limit fluids at night.    If youre worried about urine leakage or accidents, you may wear absorbent pads to catch urine. Change the pads often. This helps reduce discomfort. It may also reduce the risk of skin or bladder infections.  Follow-up care  Follow up with your healthcare provider, or as directed. It may take some to find the right treatment for your problem. Your treatment plan may include special therapies or medicines. Certain procedures or surgery may also be options. Be sure to discuss any questions you have with your provider.  When to seek medical advice  Call the healthcare provider right away if any of these occur:    Fever of 100.4 F (38 C) or higher, or as directed by your provider    Bladder pain or fullness    Abdominal swelling    Nausea or vomiting    Back pain    Weakness, dizziness or fainting  Date Last Reviewed: 10/1/2017    8008-0917 [employer]. [address] [place] [address]. All rights reserved. This information is not intended as a substitute for professional medical care. Always follow your healthcare professional's instructions.     Patient Education   Treating Incontinence in Women: Nonsurgical Methods    The best treatment for you will depend on the type of incontinence you have. Your symptoms, age, and any underlying problems that are found also affect your treatment. While some types of incontinence may eventually require surgery, nonsurgical treatments may be effective in many cases. Nonsurgical treatments include lifestyle changes,  muscle-strengthening exercises, and medicines.  Nonsurgical Treatments  Treatment for stress urinary incontinence includes:    Bladder training    Lifestyle changes such as weight loss and increased activity if incontinence is due to being overweight    Medicines, if bladder training has not helped    Pelvic floor muscle exercises  Lifestyle changes    Losing weight. Excess weight puts extra pressure on the pelvic floor muscles. Exercising and eating right can help you lose weight. This helps other treatments work better.    Making certain diet changes. Some foods may make you need to urinate more, so it may be good to avoid them. These include caffeinated drinks and alcohol. Ask your healthcare provider whether these or other diet changes might be helpful.    Quitting smoking. Smoking can lead to a chronic cough that strains pelvic floor muscles. Smoking may also damage the bladder and urethra.  Pelvic floor muscle exercises  There are exercises you can do to help strengthen your pelvic floor muscles. The pelvic floor muscles act as a sling to help hold the bladder and urethra in place. These muscles also help keep the urethra closed. Weak pelvic floor muscles may allow urine to leak. To strengthen the pelvic floor muscles, do the exercises daily. In a few months, the muscles will be stronger and tighter. This can help prevent urine leakage.  Date Last Reviewed: 1/1/2017 2000-2017 [employer]. [address] [place] [address], [place] [address]. All rights reserved. This information is not intended as a substitute for professional medical care. Always follow your healthcare professional's instructions.     Patient Education   Treating Incontinence in Women: Special Therapies     During biofeedback, sensors send signals from your pelvic floor muscles to a computer screen.     Your doctor will discuss your options for treating your urinary incontinence. These depend on the cause of your problem and any other  health issues you have. Usually behavioral changes are tried first, followed by various medicines. If these methods are unsuccessful, one or more of the therapies described below may be part of your treatment plan.  Biofeedback  This technique is taught by a nurse or physical therapist. During the therapy, a small sensor is placed in your vagina or rectum. Another sensor is placed on your stomach. Other types of sensors are also available. These sensors read signals from the pelvic floor muscles. When you contract or relax your muscles, these signals are shown as images on a computer screen. Using the images, you can learn to relax or contract certain muscles. This can help you strengthen and better control these muscles. And it can help you learn pelvic floor muscle exercises.  Electrical stimulation  This is a painless therapy that uses a tiny amount of electric current. It helps strengthen very weak or damaged pelvic floor muscles. The electric current is sent through the muscles of the pelvic floor and bladder. This causes the muscles to contract. In time, this helps make the muscles stronger.  Stimulator implants  This technique is used to treat urge incontinence. A small device is implanted under the skin near the stomach. This device gives off mild electrical signals. These block extra signals that are being sent to the bladder muscle. This helps the bladder work more normally.  Date Last Reviewed: 1/1/2017 2000-2017 [employer]. [address] [place] [address], [place] [address]. All rights reserved. This information is not intended as a substitute for professional medical care. Always follow your healthcare professional's instructions.     Patient Education   Treating Incontinence in Women with Medicine    Urinary incontinence is the leaking of urine from the bladder. In some cases, medicine can reduce or stop the leaking. It is mainly given for urge incontinence. Your healthcare provider will talk  with you about your options. Make sure to ask what side effects to expect.  Below are some types of medicines that may help with urge incontinence.  Types of medicine    Anticholinergics. These may increase how much urine the bladder can hold. They may also help relax bladder muscles.    Estrogen. This may help improve muscle tone in the urethra and bladder.    Antibiotics. These are used to treat urinary tract infections.    Botulinum toxin. Injection of botulinum toxin into the bladder muscle is an option when other medicines are not effective.  Tips for taking medicine    Take your medicine on time and as your healthcare provider tell you to.    Tell your healthcare provider if you have any side effects, your dosage may be adjusted if necessary.    Be patient. It may take time to find the right dose for you.    Keep a list of the medicines you take. Show it to your healthcare provider and pharmacist before you buy over-the-counter medicines.   Date Last Reviewed: 1/1/2017 2000-2017 [employer]. [address] [place] [address]. All rights reserved. This information is not intended as a substitute for professional medical care. Always follow your healthcare professional's instructions.     Patient Education     Kegel Exercises  Kegel exercises dont need special clothing or equipment. Theyre easy to learn and simple to do. And if you do them right, no one can tell youre doing them, so they can be done almost anywhere. Your healthcare provider, nurse, or physical therapist can answer any questions you have and help you get started.    A weak pelvic floor  The pelvic floor muscles may weaken due to aging, pregnancy and vaginal childbirth, injury, surgery, chronic cough, or lack of exercise. If the pelvic floor is weak, your bladder and other pelvic organs may sag out of place. The urethra may also open too easily and allow urine to leak out. Kegel exercises can help you strengthen your  pelvic floor muscles. Then they can better support the pelvic organs and control urine flow.  How Kegel exercises are done  Try each of the Kegel exercises described below. When youre doing them, try not to move your leg, buttock, or stomach muscles:    Contract as if you were stopping your urine stream. But do it when youre not urinating.    Tighten your rectum as if trying not to pass gas. Contract your anus, but dont move your buttocks.    You may place a finger or 2 in the vagina and squeeze your finger with your vagina to learn which muscles to tighten.  Try to hold each Kegel for a slow count to 5. You probably wont be able to hold them for that long at first. But keep practicing. It will get easier as your pelvic floor gets stronger. Eventually, special weights that you place in your vagina may be recommended to help make your Kegels even more effective. Visit your healthcare provider if you have difficulties doing Kegel exercises.  Helpful hints  Here are some tips to follow:    Do your Kegels as often as you can. The more you do them, the faster youll feel the results.    Pick an activity you do often as a reminder. For instance, do your Kegels every time you sit down.    Tighten your pelvic floor before you sneeze, get up from a chair, cough, laugh, or lift. This protects your pelvic floor from injury and can help prevent urine leakage.   Date Last Reviewed: 10/1/2017    6411-7409 [employer]. [address] [place] [address], [place] [address]. All rights reserved. This information is not intended as a substitute for professional medical care. Always follow your healthcare professional's instructions.     Patient Education   Understanding Advance Care Planning  Advance care planning is the process of deciding ones own future medical care. It helps ensure that if you cant speak for yourself, your wishes can still be carried out. The plan is a series of legal documents that note a persons wishes. The  documents vary by state. Advance care planning may be done when a person has a serious illness that is expected to get worse. It may be done before major surgery. And it can help you and your family be prepared in case of a major illness or injury. Advance care planning helps with making decisions at these times.       A health care proxy is a person who acts as the voice of a patient when the patient cant speak for himself or herself. The name of this role varies by state. It may be called a Durable Medical Power of  or Durable Power of  for Healthcare. It may be called an agent, surrogate, or advocate. Or it may be called a representative or decision maker. It is an official duty that is identified by a legal document. The document also varies by state.    Why Is Advance Care Planning Important?  If a person communicates their healthcare wishes:    They will be given medical care that matches their values and goals.    Their family members will not be forced to make decisions in a crisis with no guidance.  Creating a Plan  Making an advance care plan is often done in 3 steps:    Thinking about ones wishes. To create an advance care plan, you should think about what kind of medical treatment you would want if you lose the ability to communicate. Are there any situations in which you would refuse or stop treatment? Are there therapies you would want or not want? And whom do you want to make decisions for you? There are many places to learn more about how to plan for your care. Ask your doctor or  for resources.    Picking a health care proxy. This means choosing a trusted person to speak for you only when you cant speak for yourself. When you cannot make medical decisions, your proxy makes sure the instructions in your advance care plan are followed. A proxy does not make decisions based on his or her own opinions. They must put aside those opinions and values if needed, and carry out  your wishes.    Filling out the legal documents. There are several kinds of legal documents for advance care planning. Each one tells health care providers your wishes. The documents may vary by state. They must be signed and may need to be witnessed or notarized. You can cancel or change them whenever you wish. Depending on your state, the documents may include a Healthcare Proxy form, Living Will, Durable Medical Power of , Advance Directive, or others.  The Familys Role  The best help a family can give is to support their loved ones wishes. Open and honest communication is vital. Family should express any concerns they have about the patients choices while the patient can still make decisions.    9877-1694 [employer]. [address] [place] [address], [place] [address]. All rights reserved. This information is not intended as a substitute for professional medical care. Always follow your healthcare professional's instructions.         Also, [facility] offers a free, downloadable health care directive that allows you to share your treatment choices and personal preferences if you cannot communicate your wishes. It also allows you to appoint another person (called a health care agent) to make health care decisions if you are unable to do so. You can download an advance directive by going here: [email or web address]     Patient Education   Personalized Prevention Plan  You are due for the preventive services outlined below.  Your care team is available to assist you in scheduling these services.  If you have already completed any of these items, please share that information with your care team to update in your medical record.  Health Maintenance   Topic Date Due   ? DEPRESSION ACTION PLAN  1945   ? ZOSTER VACCINES (1 of 2) 11/01/1994   ? PNEUMOCOCCAL IMMUNIZATION 65+ LOW/MEDIUM RISK (1 of 2 - PCV13) 11/01/2009   ? MEDICARE ANNUAL WELLNESS VISIT   01/14/2020   ? FALL RISK ASSESSMENT  01/14/2020   ? COLORECTAL CANCER SCREENING  01/28/2020   ? INFLUENZA VACCINE RULE BASED (1) 08/01/2020   ? TD 18+ HE  06/02/2021   ? DXA SCAN  01/22/2021   ? ADVANCE CARE PLANNING  01/14/2024   ? LIPID  01/16/2024   ? HEPATITIS B VACCINES  Aged Out

## 2021-06-18 NOTE — LETTER
Letter by Paula Weaver MD at      Author: Paula Weaver MD Service: -- Author Type: --    Filed:  Encounter Date: 1/28/2019 Status: (Other)       Nika Navarrete  [address]  [place] [address]             January 28, 2019         Dear Ms. Rosalba,    Below are the results from your recent visit:    Resulted Orders   Occult Blood(ICT)   Result Value Ref Range    Fecal Occult Bld (ICT) 1 Negative Negative      Comment:      1/23/19    Fecal Occult Blood (ICT) 2 Negative Negative      Comment:      1/24/19    Fecal Occult Blood (ICT) 3 Negative Negative      Comment:      1/25/19       Normal fecal occult blood testing to screen for colon cancer.    Please call with questions or contact us using [employer].    Sincerely,        Electronically signed by Paula Weaver MD

## 2021-06-19 NOTE — LETTER
Letter by Paula Weaver MD at      Author: Paula Weaver MD Service: -- Author Type: --    Filed:  Encounter Date: 3/20/2019 Status: (Other)         Nika Navarrete  [address]  [place] [address]             March 20, 2019         Dear Ms. Navarrete,    Below are the results from your recent visit:    Resulted Orders   Mammo Screening Bilateral    Narrative    BILATERAL FULL FIELD DIGITAL SCREENING MAMMOGRAM    Performed on: 3/20/19      Compared to: 03/14/2018 Mammo Screening Bilateral, 02/27/2017 Mammo   Screening Bilateral, and 12/01/2015 Mammo Screening Bilateral    FINDINGS: The breasts have scattered areas of fibroglandular densities.    There are bilateral implants present.  There are benign appearing   calcifications.   No evidence for malignancy. This study was evaluated   with the assistance of Computer-Aided Detection. Continue routine   screening mammogram as recommended.    ACR BI-RADS Category 2: Benign       Normal mammogram.    Please call with questions or contact us using [employer].    Sincerely,        Electronically signed by Paula Weaver MD

## 2021-06-20 NOTE — LETTER
Letter by Paula Weaver MD at      Author: Paula Weaver MD Service: -- Author Type: --    Filed:  Encounter Date: 9/22/2020 Status: (Other)         Nika Navarrete  [address]  [place] [address]             September 22, 2020         Dear Ms. Navarrete,    Below are the results from your recent visit:    Resulted Orders   Lipid Otto FASTING   Result Value Ref Range    Cholesterol 179 <=199 mg/dL    Triglycerides 101 <=149 mg/dL    HDL Cholesterol 53 >=50 mg/dL    LDL Calculated 106 <=129 mg/dL    Patient Fasting > 8hrs? Yes    Comprehensive Metabolic Panel   Result Value Ref Range    Sodium 140 136 - 145 mmol/L    Potassium 4.5 3.5 - 5.0 mmol/L    Chloride 105 98 - 107 mmol/L    CO2 24 22 - 31 mmol/L    Anion Gap, Calculation 11 5 - 18 mmol/L    Glucose 99 70 - 125 mg/dL    BUN 15 8 - 28 mg/dL    Creatinine 0.76 0.60 - 1.10 mg/dL    GFR MDRD Af Amer >60 >60 mL/min/1.73m2    GFR MDRD Non Af Amer >60 >60 mL/min/1.73m2    Bilirubin, Total 0.6 0.0 - 1.0 mg/dL    Calcium 9.9 8.5 - 10.5 mg/dL    Protein, Total 7.1 6.0 - 8.0 g/dL    Albumin 4.6 3.5 - 5.0 g/dL    Alkaline Phosphatase 80 45 - 120 U/L    AST 15 0 - 40 U/L    ALT 17 0 - 45 U/L    Narrative    Fasting Glucose reference range is 70-99 mg/dL per  American Diabetes Association (ADA) guidelines.   Vitamin D, Total (25-Hydroxy)   Result Value Ref Range    Vitamin D, Total (25-Hydroxy) 40.3 30.0 - 80.0 ng/mL    Narrative    Deficiency <10.0 ng/mL  Insufficiency 10.0-29.9 ng/mL  Sufficiency 30.0-80.0 ng/mL  Toxicity (possible) >100.0 ng/mL   HM2(CBC w/o Differential)   Result Value Ref Range    WBC 7.9 4.0 - 11.0 thou/uL    RBC 5.01 3.80 - 5.40 mill/uL    Hemoglobin 14.7 12.0 - 16.0 g/dL    Hematocrit 43.9 35.0 - 47.0 %    MCV 88 80 - 100 fL    MCH 29.4 27.0 - 34.0 pg    MCHC 33.5 32.0 - 36.0 g/dL    RDW 13.2 11.0 - 14.5 %    Platelets 193 140 - 440 thou/uL    MPV 7.4 7.0 - 10.0 fL        Labs all good.     Please call with questions or contact  us using [employer].    Sincerely,        Electronically signed by Paula Weaver MD

## 2021-06-23 NOTE — TELEPHONE ENCOUNTER
Medication Question or Clarification  Who is calling: Patient  What medication are you calling about?:   atorvastatin (LIPITOR) 20 MG tablet 90 tablet 3 2/4/2019     Sig - Route: Take 1 tablet (20 mg total) by mouth at bedtime. - Oral      lisinopril (PRINIVIL,ZESTRIL) 20 MG tablet 90 tablet 3 2/4/2019     Sig - Route: Take 1 tablet (20 mg total) by mouth daily. - Oral      Who prescribed the medication?: Dr Weaver  What is your question/concern?: Patient states [employer] will not fill these medications due to they have questions regarding them. Patient did not know what the questions are.  Patient states she needs these medications before next Thursday as she is going [place].  Pharmacy: [facility]  Okay to leave a detailed message?: Yes  Site CMT - Please call the pharmacy to obtain any additional needed information.

## 2021-06-23 NOTE — TELEPHONE ENCOUNTER
Refill Approved    Rx renewed per Medication Renewal Policy. Medication was last renewed on 12/10/17. 8/20/17.  12/24/18    Maile Carreon, [facility] Connection Triage/Med Refill 2/4/2019     Requested Prescriptions   Pending Prescriptions Disp Refills     atorvastatin (LIPITOR) 20 MG tablet 90 tablet 3     Sig: Take 1 tablet (20 mg total) by mouth at bedtime.    Statins Refill Protocol (Hmg CoA Reductase Inhibitors) Passed - 2/4/2019 10:21 AM       Passed - PCP or prescribing provider visit in past 12 months     Last office visit with prescriber/PCP: Visit date not found OR same dept: Visit date not found OR same specialty: 12/22/2016 Hafsa Lopez FNP  Last physical: 1/14/2019 Last MTM visit: Visit date not found   Next visit within 3 mo: Visit date not found  Next physical within 3 mo: Visit date not found  Prescriber OR PCP: Paula Weaver MD  Last diagnosis associated with med order: 1. Essential familial hypercholesterolemia  - atorvastatin (LIPITOR) 20 MG tablet; Take 1 tablet (20 mg total) by mouth at bedtime.  Dispense: 90 tablet; Refill: 3    2. Depression    3. Hemorrhoids  - hydrocortisone (PROCTOZONE-HC) 2.5 % rectal cream; USE AS DIRECTED  Dispense: 30 g; Refill: 2    4. Essential hypertension  - lisinopril (PRINIVIL,ZESTRIL) 20 MG tablet; Take 1 tablet (20 mg total) by mouth daily.  Dispense: 30 tablet; Refill: 0    If protocol passes may refill for 12 months if within 3 months of last provider visit (or a total of 15 months).             hydrocortisone (PROCTOZONE-HC) 2.5 % rectal cream 30 g 2     Sig: USE AS DIRECTED    GI Medications Refill Protocol Passed - 2/4/2019 10:21 AM       Passed - PCP or prescribing provider visit in last 12 or next 3 months.    Last office visit with prescriber/PCP: Visit date not found OR same dept: Visit date not found OR same specialty: 12/22/2016 Hafsa Lopez FNP  Last physical: 1/14/2019 Last MTM visit: Visit date not found   Next visit within 3 mo: Visit date not found   Next physical within 3 mo: Visit date not found  Prescriber OR PCP: Paula Weaver MD  Last diagnosis associated with med order: 1. Essential familial hypercholesterolemia  - atorvastatin (LIPITOR) 20 MG tablet; Take 1 tablet (20 mg total) by mouth at bedtime.  Dispense: 90 tablet; Refill: 3    2. Depression    3. Hemorrhoids  - hydrocortisone (PROCTOZONE-HC) 2.5 % rectal cream; USE AS DIRECTED  Dispense: 30 g; Refill: 2    4. Essential hypertension  - lisinopril (PRINIVIL,ZESTRIL) 20 MG tablet; Take 1 tablet (20 mg total) by mouth daily.  Dispense: 30 tablet; Refill: 0    If protocol passes may refill for 12 months if within 3 months of last provider visit (or a total of 15 months).             lisinopril (PRINIVIL,ZESTRIL) 20 MG tablet 30 tablet 0     Sig: Take 1 tablet (20 mg total) by mouth daily.    Ace Inhibitors Refill Protocol Passed - 2/4/2019 10:21 AM       Passed - PCP or prescribing provider visit in past 12 months      Last office visit with prescriber/PCP: Visit date not found OR same dept: Visit date not found OR same specialty: 12/22/2016 Hafsa Lopez, DESMOND  Last physical: 1/14/2019 Last MTM visit: Visit date not found   Next visit within 3 mo: Visit date not found  Next physical within 3 mo: Visit date not found  Prescriber OR PCP: Paula Weaver MD  Last diagnosis associated with med order: 1. Essential familial hypercholesterolemia  - atorvastatin (LIPITOR) 20 MG tablet; Take 1 tablet (20 mg total) by mouth at bedtime.  Dispense: 90 tablet; Refill: 3    2. Depression    3. Hemorrhoids  - hydrocortisone (PROCTOZONE-HC) 2.5 % rectal cream; USE AS DIRECTED  Dispense: 30 g; Refill: 2    4. Essential hypertension  - lisinopril (PRINIVIL,ZESTRIL) 20 MG tablet; Take 1 tablet (20 mg total) by mouth daily.  Dispense: 30 tablet; Refill: 0    If protocol passes may refill for 12 months if within 3 months of last provider visit (or a total of 15 months).            Passed - Serum Potassium in past 12 months     Lab Results   Component Value Date    Potassium 4.4 01/16/2019            Passed - Blood pressure filed in past 12 months    BP Readings from Last 1 Encounters:   01/14/19 134/76            Passed - Serum Creatinine in past 12 months    Creatinine   Date Value Ref Range Status   01/16/2019 0.77 0.60 - 1.10 mg/dL Final

## 2021-06-23 NOTE — TELEPHONE ENCOUNTER
Pt notified that Rx's are being shipped and gave pt reference # [id number] if she needed to call them.

## 2021-06-23 NOTE — TELEPHONE ENCOUNTER
Proctozone HC cream needs clarification on frequency.  PRN is not acceptable.    Med T'd up again.  Please clarify frequency and sign new rx.

## 2021-06-23 NOTE — PROGRESS NOTES
Assessment and Plan:     Nika was seen today for annual wellness visit.    Encounter for general adult medical examination with abnormal findings  Routine history and physical, updated in EMR.  Patient declined all immunization.    Encounter for screening mammogram for malignant neoplasm of breast  -     Mammo Screening Bilateral; Future    Encounter for screening for lipoid disorders  Not fasting. On lipitor 20 mg daily. Return for fasting lab only visit  -     Lipid Dexter, FASTING; Future    Postmenopausal  Last DEXA 2015 and normal. Needs repeat  -     DXA Bone Density Scan; Future    Essential Hypercholesterolemia  -     Lipid Cascade, FASTING; Future    Encounter for screening for malignant neoplasm of colon  Declined colonoscopy. FIT ordered again for her.   -     Occult Blood(ICT); Future    Essential hypertension  In good range. Continue with lisinopril.   -     Comprehensive Metabolic Panel; Future    Mild major depression (H)  On prozac. Stable per patient.     The patient's current medical problems were reviewed.    I have had an Advance Directives discussion with the patient.  The following health maintenance schedule was reviewed with the patient and provided in printed form in the after visit summary:   Health Maintenance   Topic Date Due     ADVANCE DIRECTIVES DISCUSSED WITH PATIENT  11/01/1962     COLONOSCOPY  11/01/1994     ZOSTER VACCINES (1 of 2) 11/01/1994     PNEUMOCOCCAL POLYSACCHARIDE VACCINE AGE 65 AND OVER  11/01/2009     PNEUMOCOCCAL CONJUGATE VACCINE FOR ADULTS (PCV13 OR PREVNAR)  11/01/2009     DXA SCAN  12/01/2017     INFLUENZA VACCINE RULE BASED (1) 01/14/2019 (Originally 8/1/2018)     FALL RISK ASSESSMENT  01/14/2020     MAMMOGRAM  03/14/2020     TD 18+ HE  06/02/2021        Subjective:   Chief Complaint: Nika Navarrete is an 73 y.o. female here for an Annual Wellness visit.   HPI:   Done with pap smear. Declines shingle vaccine and pneumonia vaccine. Has h/o Hypertension,  hyperlipidemia hypertension, hyperlipidemia and mild major depression.  Hypertension, hyperlipidemia, mild major depression.  She is taking atorvastatin, lisinopril, Prozac without any issue.  She is not fasting today so fasting lab work cannot be obtained.  She is due for a bone density scan, FIT testing and mammogram.     Review of Systems:  Please see above.  The rest of the review of systems are negative for all systems.    Patient Care Team:  Paula Weaver MD as PCP - General (Family Medicine)     Patient Active Problem List   Diagnosis     Essential Hypercholesterolemia     Overweight     Lump Or Mass In Breast     No past medical history on file.   Past Surgical History:   Procedure Laterality Date     AUGMENTATION MAMMAPLASTY  2005    same time as bilateral reduction states pt.     [place] ENLARGE BREAST      Description: Breast Surgery Enlargement Procedure;  Recorded: 09/02/2014;     REDUCTION MAMMAPLASTY Bilateral 2005    bilateral reduction      Family History   Problem Relation Age of Onset     Breast cancer Mother 50     Breast cancer Maternal Aunt      Breast cancer Paternal Aunt      Ovarian cancer Daughter       Social History     Socioeconomic History     Marital status:      Spouse name: Not on file     Number of children: Not on file     Years of education: Not on file     Highest education level: Not on file   Social Needs     Financial resource strain: Not on file     Food insecurity - worry: Not on file     Food insecurity - inability: Not on file     Transportation needs - medical: Not on file     Transportation needs - non-medical: Not on file   Occupational History     Not on file   Tobacco Use     Smoking status: Former Smoker     Smokeless tobacco: Never Used     Tobacco comment: quit 2010   Substance and Sexual Activity     Alcohol use: No     Frequency: Never     Drug use: No     Sexual activity: No   Other Topics Concern     Not on file   Social History Narrative    .  "Does have a significant other although doesn't live with him    Lives alone.    Has 3 children. Has 7 grandchildren.    Watches TV, plays [employer]. 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       Current Outpatient Medications   Medication Sig Dispense Refill     acetaminophen (TYLENOL) 500 MG tablet Take 500 mg by mouth every 6 (six) hours as needed for pain.       atorvastatin (LIPITOR) 20 MG tablet TAKE 1 TABLET AT BEDTIME 90 tablet 3     CALCIUM CARBONATE (ANTACID CALCIUM ORAL) Take by mouth.       diphenhydrAMINE-acetaminophen (TYLENOL PM)  mg Tab Take 1 tablet by mouth bedtime as needed.       FLUoxetine (PROZAC) 10 MG capsule TAKE 2 CAPSULES EVERY  capsule 3     GREEN TEA EXTRACT ORAL Take by mouth.       hydrocortisone (PROCTOZONE-HC) 2.5 % rectal cream USE AS DIRECTED 30 g 2     lisinopril (PRINIVIL,ZESTRIL) 20 MG tablet TAKE 1 TABLET EVERY DAY 30 tablet 0     No current facility-administered medications for this visit.       Objective:   Vital Signs:   Visit Vitals  /76 (Patient Site: Left Arm, Patient Position: Sitting, Cuff Size: Adult Regular)   Pulse 62   Temp 98.2  F (36.8  C) (Oral)   Resp 16   Ht 5' 2.75\" (1.594 m)   Wt 160 lb 6.4 oz (72.8 kg)   BMI 28.64 kg/m         VisionScreening:  No exam data present     PHYSICAL EXAM  /76 (Patient Site: Left Arm, Patient Position: Sitting, Cuff Size: Adult Regular)   Pulse 62   Temp 98.2  F (36.8  C) (Oral)   Resp 16   Ht 5' 2.75\" (1.594 m)   Wt 160 lb 6.4 oz (72.8 kg)   BMI 28.64 kg/m      General Appearance:    Alert, cooperative, no distress, appears stated age   Head:    Normocephalic, without obvious abnormality, atraumatic   Eyes:    PERRL, conjunctiva/corneas clear, EOM's intact both eyes   Ears:    Normal TM's and external ear canals, both ears   Nose:   Nares normal, septum midline, mucosa normal, no drainage     or sinus tenderness   Throat:   Lips, mucosa, and tongue normal; teeth " and gums normal   Neck:   Supple, symmetrical, trachea midline, no adenopathy;     thyroid:  no enlargement/tenderness/nodules   Back:     Symmetric, no curvature, ROM normal, no CVA tenderness   Lungs:     Clear to auscultation bilaterally, respirations unlabored   Chest Wall:    No tenderness or deformity    Heart:    Regular rate and rhythm, S1 and S2 normal, no murmur, rub    or gallop   Breast Exam:    No tenderness, masses, or nipple abnormality, previous breast surgery scars.    Abdomen:     Soft, non-tender, bowel sounds active all four quadrants,     no masses, no organomegaly   Genitalia:    Normal female without lesion, discharge or tenderness       Extremities:   Extremities normal, atraumatic, no cyanosis or edema   Pulses:   2+ and symmetric all extremities   Skin:   Skin color, texture, turgor normal, no rashes or lesions   Lymph nodes:   Cervical, supraclavicular, and axillary nodes normal   Neurologic:   CNII-XII intact, normal strength, sensation and reflexes     throughout         Assessment Results 1/14/2019   Activities of Daily Living No help needed   Instrumental Activities of Daily Living No help needed   Mini Cog Total Score 5   Some recent data might be hidden     A Mini-Cog score of 0-2 suggests the possibility of dementia, score of 3-5 suggests no dementia    Identified Health Risks:     She is at risk for lack of exercise and has been provided with information to increase physical activity for the benefit of her well-being.  The patient was provided with written information regarding signs of hearing loss.  Information on urinary incontinence and treatment options given to patient.  The patient's PHQ-9 score is consistent with mild depression. She was provided with information regarding depression and is treated with antidepressant.   Information regarding advance directives (living [place]), including where she can download the appropriate form, was provided to the patient via the AVS.

## 2021-06-23 NOTE — TELEPHONE ENCOUNTER
Refill Request  Did you contact pharmacy: Yes  Medication name:   Requested Prescriptions     Pending Prescriptions Disp Refills     atorvastatin (LIPITOR) 20 MG tablet 90 tablet 3     Sig: Take 1 tablet (20 mg total) by mouth at bedtime.     hydrocortisone (PROCTOZONE-HC) 2.5 % rectal cream 30 g 2     Sig: USE AS DIRECTED     lisinopril (PRINIVIL,ZESTRIL) 20 MG tablet 30 tablet 0     Sig: Take 1 tablet (20 mg total) by mouth daily.     Who prescribed the medication: Paula Weaver MD   Pharmacy Name and Location: [facility]  Is patient out of medication: Patient stated she is almost out.  Patient notified refills processed in 72 hours:  no  Okay to leave a detailed message: no    Patient stated she told Paula Weaver MD at her recent visit on 1/14/19, to send refills to her pharmacy. This did not happen. Patient stated if these are sent in today, [facility] will ship them to her today.

## 2021-06-23 NOTE — TELEPHONE ENCOUNTER
[facility] pharmacy stated both Rx's are being shipped out today, no problems were noted.

## 2021-07-03 NOTE — ADDENDUM NOTE
Addendum Note by Sean Mixon MLT at 10/26/2017  4:45 PM     Author: Sean Mixon MLT Service: -- Author Type:     Filed: 10/26/2017  4:45 PM Encounter Date: 10/24/2017 Status: Signed    : Sean Mixon MLT ()    Addended by: SEAN MIXON on: 10/26/2017 04:45 PM        Modules accepted: Orders

## 2021-07-03 NOTE — ADDENDUM NOTE
Addendum Note by Terri Ramos CMA at 2/7/2019  4:14 PM     Author: Terri Ramos CMA Service: -- Author Type: Certified Medical Assistant    Filed: 2/7/2019  4:14 PM Encounter Date: 2/4/2019 Status: Signed    : Terri Ramos CMA (Certified Medical Assistant)    Addended by: TERRI RAMOS on: 2/7/2019 04:14 PM        Modules accepted: Orders

## 2021-07-03 NOTE — ADDENDUM NOTE
Addendum Note by Vitaliy Luis RT (R) at 9/18/2020 10:30 AM     Author: Vitaliy Luis RT (R) Service: -- Author Type: Radiologic Technologist    Filed: 9/18/2020  2:54 PM Encounter Date: 9/18/2020 Status: Signed    : Vitaliy Luis RT (R) (Radiologic Technologist)    Addended by: VITALIY LUIS on: 9/18/2020 02:54 PM        Modules accepted: Orders

## 2021-07-22 ENCOUNTER — OFFICE VISIT (OUTPATIENT)
Dept: FAMILY MEDICINE | Facility: CLINIC | Age: 76
End: 2021-07-22
Payer: COMMERCIAL

## 2021-07-22 VITALS
HEART RATE: 62 BPM | BODY MASS INDEX: 30.56 KG/M2 | DIASTOLIC BLOOD PRESSURE: 77 MMHG | WEIGHT: 169.8 LBS | RESPIRATION RATE: 22 BRPM | SYSTOLIC BLOOD PRESSURE: 148 MMHG

## 2021-07-22 DIAGNOSIS — Z11.59 ENCOUNTER FOR HEPATITIS C SCREENING TEST FOR LOW RISK PATIENT: ICD-10-CM

## 2021-07-22 DIAGNOSIS — R06.09 DYSPNEA ON EXERTION: ICD-10-CM

## 2021-07-22 DIAGNOSIS — I10 ESSENTIAL HYPERTENSION: ICD-10-CM

## 2021-07-22 DIAGNOSIS — R19.7 BLOODY DIARRHEA: Primary | ICD-10-CM

## 2021-07-22 DIAGNOSIS — R10.84 ABDOMINAL DISCOMFORT, GENERALIZED: ICD-10-CM

## 2021-07-22 DIAGNOSIS — Z87.891 PERSONAL HISTORY OF TOBACCO USE: ICD-10-CM

## 2021-07-22 DIAGNOSIS — Z87.891 HISTORY OF TOBACCO ABUSE: ICD-10-CM

## 2021-07-22 DIAGNOSIS — Z12.11 SCREEN FOR COLON CANCER: ICD-10-CM

## 2021-07-22 DIAGNOSIS — Z86.0100 HISTORY OF COLONIC POLYPS: ICD-10-CM

## 2021-07-22 DIAGNOSIS — F32.0 MILD MAJOR DEPRESSION (H): ICD-10-CM

## 2021-07-22 LAB
ALBUMIN SERPL-MCNC: 4.1 G/DL (ref 3.5–5)
ALP SERPL-CCNC: 72 U/L (ref 45–120)
ALT SERPL W P-5'-P-CCNC: 18 U/L (ref 0–45)
ANION GAP SERPL CALCULATED.3IONS-SCNC: 9 MMOL/L (ref 5–18)
AST SERPL W P-5'-P-CCNC: 20 U/L (ref 0–40)
ATRIAL RATE - MUSE: 53 BPM
BASOPHILS # BLD AUTO: 0.1 10E3/UL (ref 0–0.2)
BASOPHILS NFR BLD AUTO: 1 %
BILIRUB SERPL-MCNC: 0.5 MG/DL (ref 0–1)
BUN SERPL-MCNC: 18 MG/DL (ref 8–28)
CALCIUM SERPL-MCNC: 10.8 MG/DL (ref 8.5–10.5)
CHLORIDE BLD-SCNC: 105 MMOL/L (ref 98–107)
CO2 SERPL-SCNC: 28 MMOL/L (ref 22–31)
CREAT SERPL-MCNC: 0.86 MG/DL (ref 0.6–1.1)
DIASTOLIC BLOOD PRESSURE - MUSE: NORMAL MMHG
EOSINOPHIL # BLD AUTO: 0.2 10E3/UL (ref 0–0.7)
EOSINOPHIL NFR BLD AUTO: 3 %
ERYTHROCYTE [DISTWIDTH] IN BLOOD BY AUTOMATED COUNT: 14.6 % (ref 10–15)
GFR SERPL CREATININE-BSD FRML MDRD: 66 ML/MIN/1.73M2
GLUCOSE BLD-MCNC: 103 MG/DL (ref 70–125)
HCT VFR BLD AUTO: 35 % (ref 35–47)
HGB BLD-MCNC: 11.3 G/DL (ref 11.7–15.7)
IMM GRANULOCYTES # BLD: 0 10E3/UL
IMM GRANULOCYTES NFR BLD: 0 %
INTERPRETATION ECG - MUSE: NORMAL
LYMPHOCYTES # BLD AUTO: 1.2 10E3/UL (ref 0.8–5.3)
LYMPHOCYTES NFR BLD AUTO: 17 %
MCH RBC QN AUTO: 28.9 PG (ref 26.5–33)
MCHC RBC AUTO-ENTMCNC: 32.3 G/DL (ref 31.5–36.5)
MCV RBC AUTO: 90 FL (ref 78–100)
MONOCYTES # BLD AUTO: 0.6 10E3/UL (ref 0–1.3)
MONOCYTES NFR BLD AUTO: 9 %
NEUTROPHILS # BLD AUTO: 4.7 10E3/UL (ref 1.6–8.3)
NEUTROPHILS NFR BLD AUTO: 70 %
P AXIS - MUSE: 34 DEGREES
PLATELET # BLD AUTO: 206 10E3/UL (ref 150–450)
POTASSIUM BLD-SCNC: 4.1 MMOL/L (ref 3.5–5)
PR INTERVAL - MUSE: 156 MS
PROT SERPL-MCNC: 6.6 G/DL (ref 6–8)
QRS DURATION - MUSE: 80 MS
QT - MUSE: 448 MS
QTC - MUSE: 420 MS
R AXIS - MUSE: -15 DEGREES
RBC # BLD AUTO: 3.91 10E6/UL (ref 3.8–5.2)
SODIUM SERPL-SCNC: 142 MMOL/L (ref 136–145)
SYSTOLIC BLOOD PRESSURE - MUSE: NORMAL MMHG
T AXIS - MUSE: 4 DEGREES
TSH SERPL DL<=0.005 MIU/L-ACNC: 2.32 UIU/ML (ref 0.3–5)
VENTRICULAR RATE- MUSE: 53 BPM
WBC # BLD AUTO: 6.7 10E3/UL (ref 4–11)

## 2021-07-22 PROCEDURE — 93005 ELECTROCARDIOGRAM TRACING: CPT | Performed by: FAMILY MEDICINE

## 2021-07-22 PROCEDURE — 93010 ELECTROCARDIOGRAM REPORT: CPT | Performed by: INTERNAL MEDICINE

## 2021-07-22 PROCEDURE — 80050 GENERAL HEALTH PANEL: CPT | Performed by: FAMILY MEDICINE

## 2021-07-22 PROCEDURE — 36415 COLL VENOUS BLD VENIPUNCTURE: CPT | Performed by: FAMILY MEDICINE

## 2021-07-22 PROCEDURE — 99215 OFFICE O/P EST HI 40 MIN: CPT | Performed by: FAMILY MEDICINE

## 2021-07-22 PROCEDURE — 86803 HEPATITIS C AB TEST: CPT | Performed by: FAMILY MEDICINE

## 2021-07-22 RX ORDER — FLUOXETINE 10 MG/1
10 CAPSULE ORAL
COMMUNITY
Start: 2020-09-18 | End: 2021-09-30

## 2021-07-22 RX ORDER — ATORVASTATIN CALCIUM 20 MG/1
20 TABLET, FILM COATED ORAL
COMMUNITY
Start: 2020-12-16 | End: 2021-09-16

## 2021-07-22 ASSESSMENT — ANXIETY QUESTIONNAIRES
1. FEELING NERVOUS, ANXIOUS, OR ON EDGE: SEVERAL DAYS
IF YOU CHECKED OFF ANY PROBLEMS ON THIS QUESTIONNAIRE, HOW DIFFICULT HAVE THESE PROBLEMS MADE IT FOR YOU TO DO YOUR WORK, TAKE CARE OF THINGS AT HOME, OR GET ALONG WITH OTHER PEOPLE: SOMEWHAT DIFFICULT
5. BEING SO RESTLESS THAT IT IS HARD TO SIT STILL: NOT AT ALL
6. BECOMING EASILY ANNOYED OR IRRITABLE: SEVERAL DAYS
GAD7 TOTAL SCORE: 4
7. FEELING AFRAID AS IF SOMETHING AWFUL MIGHT HAPPEN: NOT AT ALL
3. WORRYING TOO MUCH ABOUT DIFFERENT THINGS: SEVERAL DAYS
4. TROUBLE RELAXING: SEVERAL DAYS
2. NOT BEING ABLE TO STOP OR CONTROL WORRYING: NOT AT ALL

## 2021-07-22 ASSESSMENT — PATIENT HEALTH QUESTIONNAIRE - PHQ9: SUM OF ALL RESPONSES TO PHQ QUESTIONS 1-9: 14

## 2021-07-22 NOTE — PROGRESS NOTES
Assessment & Plan     Bloody diarrhea  Resolved since. Can't do the prep for colonoscopy. Discussed that FIT and cologuard no longer recommended for her given symptoms. Recommended colonoscopy but she can't do the prep - she couldn't tolerate prep last time she tried in 2003. Will do CT abdomen/pelvis for now and monitor her symptoms. If return would strongly recommend that she goes get a colonoscopy. Offered to send her to GI for second opinion but she doesn't want to now.   - CBC with platelets and differential  - Comprehensive metabolic panel (BMP + Alb, Alk Phos, ALT, AST, Total. Bili, TP)  - CT Abdomen Pelvis w/o & w Contrast    Abdominal discomfort, generalized  See above. Appearing well overall.   - CT Abdomen Pelvis w/o & w Contrast    Mild major depression (H)  Lab today.  - Comprehensive metabolic panel (BMP + Alb, Alk Phos, ALT, AST, Total. Bili, TP)  - TSH with free T4 reflex    Encounter for hepatitis C screening test for low risk patient  - Hepatitis C antibody    Essential hypertension  Mildly elevated.  Could be secondary to her concerns with rectal bleeding.  Follow-up in 2 months and if still elevated, will adjust her medication.    History of colonic polyps  See above.  Cannot do a prep.  She may reconsider if symptoms return.    Screen for colon cancer  See above.  She has been doing FIT testing otherwise recommended today as she needs to do colonoscopy given episode of bloody stool.  She will continue to monitor for now and may reconsider colonoscopy if symptoms return.    History of tobacco abuse  Personal history of tobacco use  Has quit smoking more than 15 years ago, did have more than 30 years of smoking history.  Not eligible for CT low-dose lung cancer screening.  - Prof Fee: Shared Decision Making Visit for Lung Cancer Screening    Dyspnea on exertion  Vague symptom.  Will get EKG and chest x-ray today.  Per my personal review, EKG shows sinus bradycardia but otherwise unremarkable.   Per my personal review chest x-ray is normal, no pneumonia or edema or anything significant.  Given her smoking history I think it is wise to go ahead and do a PFT.  I sent her my chart message explaining imaging results and recommendation for PFT.  PFT is ordered for her.  - XR Chest 2 Views  - EKG 12-lead, tracing only      Return in about 2 months (around 9/22/2021) for Routine preventive, with me, in person.    Greater than 45 minutes was spent today with patient ,more than 50% of time was counseling and coordination of care on the above issues        Paula Weaver MD  [facility]    Alycia Rivera is a 75 year old who presents for the following health issues    HPI   Chief Complaint   Patient presents with     Rectal Problem     a couple weeks ago she had diarrhea really bad and it was mostly blood. Did notice some clots in it also.      Has has history of diverticulitis in the past.  3 weeks ago had abdominal pain/discomfort and bloody stool. Lasting 1 day. Did have diarrhea as well. Had 2-3 episodes. Denies fever/chills, dizziness, lightheadedness.  This is not as bad as diverticulitis previously. Last episode was couple years ago.  Now she as a couple stools a day.     Review of Systems   Constitutional, HEENT, cardiovascular, pulmonary, GI, , musculoskeletal, neuro, skin, endocrine and psych systems are negative, except as otherwise noted.      Objective    BP (!) 148/77   Pulse 62   Resp 22   Wt 77 kg (169 lb 12.8 oz)   BMI 30.56 kg/m    Body mass index is 30.56 kg/m .  Physical Exam   GENERAL: healthy, alert and no distress  NECK: no adenopathy, no asymmetry, masses, or scars and thyroid normal to palpation  RESP: lungs clear to auscultation - no rales, rhonchi or wheezes  CV: regular rate and rhythm, normal S1 S2, no S3 or S4, no murmur, click or rub, no peripheral edema and peripheral pulses strong  ABDOMEN: soft, nontender, no hepatosplenomegaly, no masses  and bowel sounds normal  ABDOMEN: tenderness generalized but mild.   MS: no gross musculoskeletal defects noted, no edema          Lung Cancer Screening Shared Decision Making Visit     Nika Navarrete is not eligible for lung cancer screening on the basis of the information provided in my signed lung cancer screening order. Nika's smoking history is below the threshold and so it is not recommended.    Patient is not currently a smoker and so we did not discuss that the only way to prevent lung cancer is to not smoke. Smoking cessation counseling was not given.    I did offer risk estimation using a calculator such as this one:    ShouldIScreen

## 2021-07-23 ENCOUNTER — RECORDS - HEALTHEAST (OUTPATIENT)
Dept: FAMILY MEDICINE | Facility: CLINIC | Age: 76
End: 2021-07-23

## 2021-07-23 DIAGNOSIS — N63.0 LUMP OR MASS IN BREAST: ICD-10-CM

## 2021-07-23 LAB — HCV AB SERPL QL IA: NEGATIVE

## 2021-07-23 ASSESSMENT — ANXIETY QUESTIONNAIRES: GAD7 TOTAL SCORE: 4

## 2021-08-04 ENCOUNTER — HOSPITAL ENCOUNTER (OUTPATIENT)
Dept: CT IMAGING | Facility: HOSPITAL | Age: 76
Discharge: HOME | End: 2021-08-04
Attending: FAMILY MEDICINE | Admitting: FAMILY MEDICINE
Payer: COMMERCIAL

## 2021-08-04 DIAGNOSIS — R19.7 BLOODY DIARRHEA: ICD-10-CM

## 2021-08-04 DIAGNOSIS — R10.84 ABDOMINAL DISCOMFORT, GENERALIZED: ICD-10-CM

## 2021-08-04 PROCEDURE — 250N000011 HC RX IP 250 OP 636: Performed by: FAMILY MEDICINE

## 2021-08-04 PROCEDURE — 74177 CT ABD & PELVIS W/CONTRAST: CPT

## 2021-08-04 RX ORDER — IOPAMIDOL 755 MG/ML
100 INJECTION, SOLUTION INTRAVASCULAR ONCE
Status: COMPLETED | OUTPATIENT
Start: 2021-08-04 | End: 2021-08-04

## 2021-08-04 RX ADMIN — IOPAMIDOL 100 ML: 755 INJECTION, SOLUTION INTRAVENOUS at 11:47

## 2021-08-23 ENCOUNTER — HOSPITAL ENCOUNTER (OUTPATIENT)
Dept: RESPIRATORY THERAPY | Facility: HOSPITAL | Age: 76
Discharge: HOME | End: 2021-08-23
Attending: FAMILY MEDICINE | Admitting: FAMILY MEDICINE
Payer: COMMERCIAL

## 2021-08-23 DIAGNOSIS — Z87.891 HISTORY OF TOBACCO ABUSE: ICD-10-CM

## 2021-08-23 DIAGNOSIS — Z87.891 PERSONAL HISTORY OF TOBACCO USE: ICD-10-CM

## 2021-08-23 DIAGNOSIS — R06.09 DYSPNEA ON EXERTION: ICD-10-CM

## 2021-08-23 LAB
DLCOCOR-%PRED-PRE: 88 %
DLCOCOR-PRE: 16 ML/MIN/MMHG
DLCOUNC-%PRED-PRE: 88 %
DLCOUNC-PRE: 15.95 ML/MIN/MMHG
DLCOUNC-PRED: 18.09 ML/MIN/MMHG
ERV-%PRED-PRE: 48 %
ERV-PRE: 0.18 L
ERV-PRED: 0.38 L
EXPTIME-PRE: 8.59 SEC
FEF2575-%PRED-POST: 139 %
FEF2575-%PRED-PRE: 150 %
FEF2575-POST: 2.25 L/SEC
FEF2575-PRE: 2.44 L/SEC
FEF2575-PRED: 1.62 L/SEC
FEFMAX-%PRED-PRE: 139 %
FEFMAX-PRE: 6.88 L/SEC
FEFMAX-PRED: 4.93 L/SEC
FEV1-%PRED-PRE: 128 %
FEV1-PRE: 2.5 L
FEV1FEV6-PRE: 80 %
FEV1FEV6-PRED: 78 %
FEV1FVC-PRE: 79 %
FEV1FVC-PRED: 78 %
FEV1SVC-PRE: 88 %
FEV1SVC-PRED: 71 %
FIFMAX-PRE: 2.72 L/SEC
FRCPLETH-%PRED-PRE: 88 %
FRCPLETH-PRE: 2.33 L
FRCPLETH-PRED: 2.63 L
FVC-%PRED-PRE: 125 %
FVC-PRE: 3.16 L
FVC-PRED: 2.52 L
HGB BLD-MCNC: 13.3 G/DL (ref 11.7–15.7)
IC-%PRED-PRE: 112 %
IC-PRE: 2.67 L
IC-PRED: 2.37 L
RVPLETH-%PRED-PRE: 102 %
RVPLETH-PRE: 2.15 L
RVPLETH-PRED: 2.09 L
TLCPLETH-%PRED-PRE: 106 %
TLCPLETH-PRE: 5 L
TLCPLETH-PRED: 4.69 L
VA-%PRED-PRE: 100 %
VA-PRE: 4.39 L
VC-%PRED-PRE: 103 %
VC-PRE: 2.85 L
VC-PRED: 2.74 L

## 2021-08-23 PROCEDURE — 999N000157 HC STATISTIC RCP TIME EA 10 MIN

## 2021-08-23 PROCEDURE — 94060 EVALUATION OF WHEEZING: CPT | Mod: 26 | Performed by: INTERNAL MEDICINE

## 2021-08-23 PROCEDURE — 94726 PLETHYSMOGRAPHY LUNG VOLUMES: CPT | Mod: 26 | Performed by: INTERNAL MEDICINE

## 2021-08-23 PROCEDURE — 94729 DIFFUSING CAPACITY: CPT

## 2021-08-23 PROCEDURE — 99207 PR NO BILLABLE SERVICE THIS VISIT: CPT | Mod: 26 | Performed by: INTERNAL MEDICINE

## 2021-08-23 PROCEDURE — 94729 DIFFUSING CAPACITY: CPT | Mod: 26 | Performed by: INTERNAL MEDICINE

## 2021-08-23 PROCEDURE — 94726 PLETHYSMOGRAPHY LUNG VOLUMES: CPT

## 2021-08-23 PROCEDURE — 250N000009 HC RX 250: Performed by: FAMILY MEDICINE

## 2021-08-23 PROCEDURE — 94060 EVALUATION OF WHEEZING: CPT

## 2021-08-23 PROCEDURE — 85018 HEMOGLOBIN: CPT | Performed by: FAMILY MEDICINE

## 2021-08-23 PROCEDURE — 94640 AIRWAY INHALATION TREATMENT: CPT

## 2021-08-23 PROCEDURE — 36415 COLL VENOUS BLD VENIPUNCTURE: CPT | Performed by: FAMILY MEDICINE

## 2021-08-23 RX ORDER — ALBUTEROL SULFATE 0.83 MG/ML
2.5 SOLUTION RESPIRATORY (INHALATION)
Status: COMPLETED | OUTPATIENT
Start: 2021-08-23 | End: 2021-08-23

## 2021-08-23 RX ADMIN — ALBUTEROL SULFATE 2.5 MG: 2.5 SOLUTION RESPIRATORY (INHALATION) at 10:46

## 2021-08-23 NOTE — PROGRESS NOTES
RESPIRATORY CARE NOTE    Complete Pulmonary Function Test completed by patient.  Good patient effort and cooperation. Albuterol 2.5 mg neb given for bronchodilation.  The results of this test meet the ATS standards for acceptability and repeatability, except Dlco IV < 90% of VC in one effort. PT left in no distress.    Dyana Brooke, RT  8/23/2021      
General

## 2021-09-15 DIAGNOSIS — E78.01 ESSENTIAL FAMILIAL HYPERCHOLESTEROLEMIA: Primary | ICD-10-CM

## 2021-09-15 DIAGNOSIS — I10 ESSENTIAL HYPERTENSION: ICD-10-CM

## 2021-09-16 RX ORDER — ATORVASTATIN CALCIUM 20 MG/1
20 TABLET, FILM COATED ORAL AT BEDTIME
Qty: 90 TABLET | Refills: 0 | Status: SHIPPED | OUTPATIENT
Start: 2021-09-16 | End: 2021-09-30

## 2021-09-16 NOTE — TELEPHONE ENCOUNTER
"  Disp Refills Start End JESU    lisinopriL (PRINIVIL,ZESTRIL) 20 MG tablet 90 tablet 2 12/16/2020  No   Sig - Route: Take 1 tablet (20 mg total) by mouth daily. - Oral   Sent to pharmacy as: lisinopriL 20 mg tablet (PRINIVIL,ZESTRIL)   E-Prescribing Status: Receipt confirmed by pharmacy (12/16/2020 11:47 AM CST)       lisinopriL (PRINIVIL,ZESTRIL) 20 MG tablet [[id number]]    Electronically signed by: Maile Carreon, RN on 12/16/20 1147 Status: Active   Ordering user: Maile Carreon RN 12/16/20 1147 Ordering provider: Paula Weaver MD   Authorized by: Paula Weaver MD   Frequency: DAILY 12/16/20 - Until Discontinued Released by: Maile Carreon RN 12/16/20 1147   Diagnoses  Essential hypertension [I10]       Routing refill request to provider for review/approval because:  Labs out of range:  BP    Last Written Prescription Date:  12/16/20  Last Fill Quantity: 90,  # refills: 2   Last office visit provider:  7/22/21     Requested Prescriptions   Pending Prescriptions Disp Refills     lisinopril (ZESTRIL) 20 MG tablet [Pharmacy Med Name: LISINOPRIL 20 MG Tablet] 90 tablet 3     Sig: TAKE 1 TABLET EVERY DAY       ACE Inhibitors (Including Combos) Protocol Failed - 9/15/2021  1:36 PM        Failed - Blood pressure under 140/90 in past 12 months     BP Readings from Last 3 Encounters:   07/22/21 (!) 148/77   09/18/20 (!) 146/96   07/25/13 132/76                 Passed - Recent (12 mo) or future (30 days) visit within the authorizing provider's specialty     Patient has had an office visit with the authorizing provider or a provider within the authorizing providers department within the previous 12 mos or has a future within next 30 days. See \"Patient Info\" tab in inbasket, or \"Choose Columns\" in Meds & Orders section of the refill encounter.              Passed - Medication is active on med list        Passed - Patient is age 18 or older        Passed - No active pregnancy on record        Passed - Normal serum " "creatinine on file in past 12 months     Recent Labs   Lab Test 07/22/21  1632   CR 0.86       Ok to refill medication if creatinine is low          Passed - Normal serum potassium on file in past 12 months     Recent Labs   Lab Test 07/22/21  1632   POTASSIUM 4.1             Passed - No positive pregnancy test within past 12 months         Signed Prescriptions Disp Refills    atorvastatin (LIPITOR) 20 MG tablet 90 tablet 0     Sig: Take 1 tablet (20 mg) by mouth At Bedtime       Statins Protocol Passed - 9/15/2021  1:36 PM        Passed - LDL on file in past 12 months     Recent Labs   Lab Test 09/18/20  1115                Passed - No abnormal creatine kinase in past 12 months     No lab results found.             Passed - Recent (12 mo) or future (30 days) visit within the authorizing provider's specialty     Patient has had an office visit with the authorizing provider or a provider within the authorizing providers department within the previous 12 mos or has a future within next 30 days. See \"Patient Info\" tab in inbasket, or \"Choose Columns\" in Meds & Orders section of the refill encounter.              Passed - Medication is active on med list        Passed - Patient is age 18 or older        Passed - No active pregnancy on record        Passed - No positive pregnancy test in past 12 months             Luis Thomas RN 09/16/21 10:47 AM  "

## 2021-09-16 NOTE — TELEPHONE ENCOUNTER
"  Disp Refills Start End JESU    atorvastatin (LIPITOR) 20 MG tablet 90 tablet 2 12/16/2020  No   Sig - Route: Take 1 tablet (20 mg total) by mouth at bedtime. - Oral   Sent to pharmacy as: atorvastatin 20 mg tablet (LIPITOR)   E-Prescribing Status: Receipt confirmed by pharmacy (12/16/2020 11:47 AM CST)       Last Written Prescription Date:  12/16/20  Last Fill Quantity: 90,  # refills: 2   Last office visit provider:  7/22/21     Requested Prescriptions   Pending Prescriptions Disp Refills     atorvastatin (LIPITOR) 20 MG tablet [Pharmacy Med Name: ATORVASTATIN CALCIUM 20 MG Tablet] 90 tablet      Sig: TAKE 1 TABLET AT BEDTIME       Statins Protocol Passed - 9/15/2021  1:36 PM        Passed - LDL on file in past 12 months     Recent Labs   Lab Test 09/18/20  1115                Passed - No abnormal creatine kinase in past 12 months     No lab results found.             Passed - Recent (12 mo) or future (30 days) visit within the authorizing provider's specialty     Patient has had an office visit with the authorizing provider or a provider within the authorizing providers department within the previous 12 mos or has a future within next 30 days. See \"Patient Info\" tab in inbasket, or \"Choose Columns\" in Meds & Orders section of the refill encounter.              Passed - Medication is active on med list        Passed - Patient is age 18 or older        Passed - No active pregnancy on record        Passed - No positive pregnancy test in past 12 months           lisinopril (ZESTRIL) 20 MG tablet [Pharmacy Med Name: LISINOPRIL 20 MG Tablet] 90 tablet 3     Sig: TAKE 1 TABLET EVERY DAY       ACE Inhibitors (Including Combos) Protocol Failed - 9/15/2021  1:36 PM        Failed - Blood pressure under 140/90 in past 12 months     BP Readings from Last 3 Encounters:   07/22/21 (!) 148/77   09/18/20 (!) 146/96   07/25/13 132/76                 Passed - Recent (12 mo) or future (30 days) visit within the authorizing " "provider's specialty     Patient has had an office visit with the authorizing provider or a provider within the authorizing providers department within the previous 12 mos or has a future within next 30 days. See \"Patient Info\" tab in inbasket, or \"Choose Columns\" in Meds & Orders section of the refill encounter.              Passed - Medication is active on med list        Passed - Patient is age 18 or older        Passed - No active pregnancy on record        Passed - Normal serum creatinine on file in past 12 months     Recent Labs   Lab Test 07/22/21  1632   CR 0.86       Ok to refill medication if creatinine is low          Passed - Normal serum potassium on file in past 12 months     Recent Labs   Lab Test 07/22/21  1632   POTASSIUM 4.1             Passed - No positive pregnancy test within past 12 months             Luis Thomas RN 09/16/21 10:46 AM  "

## 2021-09-18 ENCOUNTER — HEALTH MAINTENANCE LETTER (OUTPATIENT)
Age: 76
End: 2021-09-18

## 2021-09-18 RX ORDER — LISINOPRIL 20 MG/1
TABLET ORAL
Qty: 90 TABLET | Refills: 3 | Status: SHIPPED | OUTPATIENT
Start: 2021-09-18 | End: 2021-09-30

## 2021-09-30 ENCOUNTER — OFFICE VISIT (OUTPATIENT)
Dept: FAMILY MEDICINE | Facility: CLINIC | Age: 76
End: 2021-09-30
Payer: COMMERCIAL

## 2021-09-30 VITALS
SYSTOLIC BLOOD PRESSURE: 150 MMHG | HEART RATE: 58 BPM | HEIGHT: 63 IN | DIASTOLIC BLOOD PRESSURE: 76 MMHG | RESPIRATION RATE: 16 BRPM | WEIGHT: 169.2 LBS | BODY MASS INDEX: 29.98 KG/M2

## 2021-09-30 DIAGNOSIS — Z78.0 POSTMENOPAUSAL STATUS: ICD-10-CM

## 2021-09-30 DIAGNOSIS — Z12.11 SCREEN FOR COLON CANCER: ICD-10-CM

## 2021-09-30 DIAGNOSIS — F32.0 MILD MAJOR DEPRESSION (H): ICD-10-CM

## 2021-09-30 DIAGNOSIS — I10 ESSENTIAL HYPERTENSION: ICD-10-CM

## 2021-09-30 DIAGNOSIS — K57.31 DIVERTICULOSIS OF LARGE INTESTINE WITH HEMORRHAGE: ICD-10-CM

## 2021-09-30 DIAGNOSIS — M85.80 OSTEOPENIA AFTER MENOPAUSE: ICD-10-CM

## 2021-09-30 DIAGNOSIS — Z00.01 ENCOUNTER FOR GENERAL ADULT MEDICAL EXAMINATION WITH ABNORMAL FINDINGS: Primary | ICD-10-CM

## 2021-09-30 DIAGNOSIS — Z87.891 PERSONAL HISTORY OF TOBACCO USE: ICD-10-CM

## 2021-09-30 DIAGNOSIS — Z78.0 OSTEOPENIA AFTER MENOPAUSE: ICD-10-CM

## 2021-09-30 DIAGNOSIS — E55.9 VITAMIN D DEFICIENCY: ICD-10-CM

## 2021-09-30 DIAGNOSIS — E78.01 ESSENTIAL FAMILIAL HYPERCHOLESTEROLEMIA: ICD-10-CM

## 2021-09-30 LAB
ALBUMIN SERPL-MCNC: 4.3 G/DL (ref 3.5–5)
ALP SERPL-CCNC: 72 U/L (ref 45–120)
ALT SERPL W P-5'-P-CCNC: 21 U/L (ref 0–45)
ANION GAP SERPL CALCULATED.3IONS-SCNC: 11 MMOL/L (ref 5–18)
AST SERPL W P-5'-P-CCNC: 17 U/L (ref 0–40)
BILIRUB SERPL-MCNC: 0.6 MG/DL (ref 0–1)
BUN SERPL-MCNC: 16 MG/DL (ref 8–28)
CALCIUM SERPL-MCNC: 10.5 MG/DL (ref 8.5–10.5)
CHLORIDE BLD-SCNC: 106 MMOL/L (ref 98–107)
CHOLEST SERPL-MCNC: 155 MG/DL
CO2 SERPL-SCNC: 24 MMOL/L (ref 22–31)
CREAT SERPL-MCNC: 0.73 MG/DL (ref 0.6–1.1)
ERYTHROCYTE [DISTWIDTH] IN BLOOD BY AUTOMATED COUNT: 13.8 % (ref 10–15)
FASTING STATUS PATIENT QL REPORTED: YES
GFR SERPL CREATININE-BSD FRML MDRD: 80 ML/MIN/1.73M2
GLUCOSE BLD-MCNC: 88 MG/DL (ref 70–125)
HCT VFR BLD AUTO: 42.4 % (ref 35–47)
HDLC SERPL-MCNC: 48 MG/DL
HGB BLD-MCNC: 13.6 G/DL (ref 11.7–15.7)
LDLC SERPL CALC-MCNC: 80 MG/DL
MCH RBC QN AUTO: 27.8 PG (ref 26.5–33)
MCHC RBC AUTO-ENTMCNC: 32.1 G/DL (ref 31.5–36.5)
MCV RBC AUTO: 87 FL (ref 78–100)
PLATELET # BLD AUTO: 184 10E3/UL (ref 150–450)
POTASSIUM BLD-SCNC: 4.3 MMOL/L (ref 3.5–5)
PROT SERPL-MCNC: 6.9 G/DL (ref 6–8)
RBC # BLD AUTO: 4.89 10E6/UL (ref 3.8–5.2)
SODIUM SERPL-SCNC: 141 MMOL/L (ref 136–145)
TRIGL SERPL-MCNC: 135 MG/DL
WBC # BLD AUTO: 7 10E3/UL (ref 4–11)

## 2021-09-30 PROCEDURE — 99397 PER PM REEVAL EST PAT 65+ YR: CPT | Performed by: FAMILY MEDICINE

## 2021-09-30 PROCEDURE — 36415 COLL VENOUS BLD VENIPUNCTURE: CPT | Performed by: FAMILY MEDICINE

## 2021-09-30 PROCEDURE — 99214 OFFICE O/P EST MOD 30 MIN: CPT | Mod: 25 | Performed by: FAMILY MEDICINE

## 2021-09-30 PROCEDURE — 80053 COMPREHEN METABOLIC PANEL: CPT | Performed by: FAMILY MEDICINE

## 2021-09-30 PROCEDURE — 80061 LIPID PANEL: CPT | Performed by: FAMILY MEDICINE

## 2021-09-30 PROCEDURE — 85027 COMPLETE CBC AUTOMATED: CPT | Performed by: FAMILY MEDICINE

## 2021-09-30 PROCEDURE — 82306 VITAMIN D 25 HYDROXY: CPT | Performed by: FAMILY MEDICINE

## 2021-09-30 RX ORDER — LISINOPRIL 30 MG/1
30 TABLET ORAL DAILY
Qty: 90 TABLET | Refills: 1 | Status: SHIPPED | OUTPATIENT
Start: 2021-09-30 | End: 2022-02-07

## 2021-09-30 RX ORDER — FLUOXETINE 10 MG/1
20 CAPSULE ORAL DAILY
Start: 2021-09-30 | End: 2022-06-23

## 2021-09-30 RX ORDER — ATORVASTATIN CALCIUM 20 MG/1
20 TABLET, FILM COATED ORAL AT BEDTIME
Qty: 90 TABLET | Refills: 3 | Status: SHIPPED | OUTPATIENT
Start: 2021-09-30 | End: 2022-01-03

## 2021-09-30 ASSESSMENT — PATIENT HEALTH QUESTIONNAIRE - PHQ9
SUM OF ALL RESPONSES TO PHQ QUESTIONS 1-9: 8
SUM OF ALL RESPONSES TO PHQ QUESTIONS 1-9: 8
10. IF YOU CHECKED OFF ANY PROBLEMS, HOW DIFFICULT HAVE THESE PROBLEMS MADE IT FOR YOU TO DO YOUR WORK, TAKE CARE OF THINGS AT HOME, OR GET ALONG WITH OTHER PEOPLE: NOT DIFFICULT AT ALL

## 2021-09-30 ASSESSMENT — MIFFLIN-ST. JEOR: SCORE: 1222.65

## 2021-09-30 ASSESSMENT — ACTIVITIES OF DAILY LIVING (ADL): CURRENT_FUNCTION: NO ASSISTANCE NEEDED

## 2021-09-30 NOTE — LETTER
October 13, 2021      Nika Navarrete  [facility] [facility] [address] [place] [id number]        Dear ,    We are writing to inform you of your test results.    Labs all normal except vitamin D is in the low range of normal.  I recommend supplementing with 2000 units of vitamin D daily.  Let Me know if you have any question.      Resulted Orders   Lipid panel   Result Value Ref Range    Cholesterol 155 <=199 mg/dL    Triglycerides 135 <=149 mg/dL    Direct Measure HDL 48 (L) >=50 mg/dL      Comment:      HDL Cholesterol Reference Range:     0-2 years:   No reference ranges established for patients under 2 years old  at [facility] [employer] for lipid analytes.    2-8 years:  Greater than 45 mg/dL     18 years and older:   Female: Greater than or equal to 50 mg/dL   Male:   Greater than or equal to 40 mg/dL    LDL Cholesterol Calculated 80 <=129 mg/dL    Patient Fasting > 8hrs? Yes    Comprehensive metabolic panel (BMP + Alb, Alk Phos, ALT, AST, Total. Bili, TP)   Result Value Ref Range    Sodium 141 136 - 145 mmol/L    Potassium 4.3 3.5 - 5.0 mmol/L    Chloride 106 98 - 107 mmol/L    Carbon Dioxide (CO2) 24 22 - 31 mmol/L    Anion Gap 11 5 - 18 mmol/L    Urea Nitrogen 16 8 - 28 mg/dL    Creatinine 0.73 0.60 - 1.10 mg/dL    Calcium 10.5 8.5 - 10.5 mg/dL    Glucose 88 70 - 125 mg/dL    Alkaline Phosphatase 72 45 - 120 U/L    AST 17 0 - 40 U/L    ALT 21 0 - 45 U/L    Protein Total 6.9 6.0 - 8.0 g/dL    Albumin 4.3 3.5 - 5.0 g/dL    Bilirubin Total 0.6 0.0 - 1.0 mg/dL    GFR Estimate 80 >60 mL/min/1.73m2      Comment:      As of July 11, 2021, eGFR is calculated by the CKD-EPI creatinine equation, without race adjustment. eGFR can be influenced by muscle mass, exercise, and diet. The reported eGFR is an estimation only and is only applicable if the renal function is stable.   CBC with platelets   Result Value Ref Range    WBC Count 7.0 4.0 - 11.0 10e3/uL    RBC Count 4.89 3.80 - 5.20 10e6/uL    Hemoglobin 13.6  11.7 - 15.7 g/dL    Hematocrit 42.4 35.0 - 47.0 %    MCV 87 78 - 100 fL    MCH 27.8 26.5 - 33.0 pg    MCHC 32.1 31.5 - 36.5 g/dL    RDW 13.8 10.0 - 15.0 %    Platelet Count 184 150 - 450 10e3/uL   Vitamin D Deficiency   Result Value Ref Range    Vitamin D, Total (25-Hydroxy) 37 30 - 80 ug/L    Narrative    Deficiency <10.0 ug/L  Insufficiency 10.0-29.9 ug/L  Sufficiency 30.0-80.0 ug/L  Toxicity (possible) >100.0 ug/L        If you have any questions or concerns, please call the clinic at the number listed above.       Sincerely,      Paula Weaver MD

## 2021-09-30 NOTE — PATIENT INSTRUCTIONS
Patient Education   Personalized Prevention Plan  You are due for the preventive services outlined below.  Your care team is available to assist you in scheduling these services.  If you have already completed any of these items, please share that information with your care team to update in your medical record.  Health Maintenance Due   Topic Date Due     Zoster (Shingles) Vaccine (1 of 2) Never done     Pneumococcal Vaccine (1 of 1 - PPSV23) Never done     Colorectal Cancer Screening  07/17/2012     Diptheria Tetanus Pertussis (DTAP/TDAP/TD) Vaccine (3 - Td or Tdap) 06/02/2021     Flu Vaccine (1) Never done     FALL RISK ASSESSMENT  09/18/2021       Exercise for a Healthier Heart  You may wonder how you can improve the health of your heart. If you re thinking about exercise, you re on the right track. You don t need to become an athlete. But you do need a certain amount of brisk exercise to help strengthen your heart. If you have been diagnosed with a heart condition, your healthcare provider may advise exercise to help stabilize your condition. To help make exercise a habit, choose safe, fun activities.      Exercise with a friend. When activity is fun, you're more likely to stick with it.   Before you start  Check with your healthcare provider before starting an exercise program. This is especially important if you have not been active for a while. It's also important if you have a long-term (chronic) health problem such as heart disease, diabetes, or obesity. Or if you are at high risk for having these problems.   Why exercise?  Exercising regularly offers many healthy rewards. It can help you do all of the following:     Improve your blood cholesterol level to help prevent further heart trouble    Lower your blood pressure to help prevent a stroke or heart attack    Control diabetes, or reduce your risk of getting this disease    Improve your heart and lung function    Reach and stay at a healthy  weight    Make your muscles stronger so you can stay active    Prevent falls and fractures by slowing the loss of bone mass (osteoporosis)    Manage stress better    Reduce your blood pressure    Improve your sense of self and your body image  Exercise tips      Ease into your routine. Set small goals. Then build on them. If you are not sure what your activity level should be, talk with your healthcare provider first before starting an exercise routine.    Exercise on most days. Aim for a total of 150 minutes (2 hours and 30 minutes) or more of moderate-intensity aerobic activity each week. Or 75 minutes (1 hour and 15 minutes) or more of vigorous-intensity aerobic activity each week. Or try for a combination of both. Moderate activity means that you breathe heavier and your heart rate increases but you can still talk. Think about doing 40 minutes of moderate exercise, 3 to 4 times a week. For best results, activity should last for about 40 minutes to lower blood pressure and cholesterol. It's OK to work up to the 40-minute period over time. Examples of moderate-intensity activity are walking 1 mile in 15 minutes. Or doing 30 to 45 minutes of yard work.    Step up your daily activity level.  Along with your exercise program, try being more active the whole day. Walk instead of drive. Or park further away so that you take more steps each day. Do more household tasks or yard work. You may not be able to meet the advised mount of physical activity. But doing some moderate- or vigorous-intensity aerobic activity can help reduce your risk for heart disease. Your healthcare provider can help you figure out what is best for you.    Choose 1 or more activities you enjoy.  Walking is one of the easiest things you can do. You can also try swimming, riding a bike, dancing, or taking an exercise class.    When to call your healthcare provider  Call your healthcare provider if you have any of these:     Chest pain or feel dizzy  or lightheaded    Burning, tightness, pressure, or heaviness in your chest, neck, shoulders, back, or arms    Abnormal shortness of breath    More joint or muscle pain    A very fast or irregular heartbeat (palpitations)  [employer] last reviewed this educational content on 7/1/2019 2000-2021 [employer]. All rights reserved. This information is not intended as a substitute for professional medical care. Always follow your healthcare professional's instructions.          Signs of Hearing Loss      Hearing much better with one ear can be a sign of hearing loss.   Hearing loss is a problem shared by many people. In fact, it is one of the most common health problems, particularly as people age. Most people age 65 and older have some hearing loss. By age 80, almost everyone does. Hearing loss often occurs slowly over the years. So you may not realize your hearing has gotten worse.  Have your hearing checked  Call your healthcare provider if you:    Have to strain to hear normal conversation    Have to watch other people s faces very carefully to follow what they re saying    Need to ask people to repeat what they ve said    Often misunderstand what people are saying    Turn the volume of the television or radio up so high that others complain    Feel that people are mumbling when they re talking to you    Find that the effort to hear leaves you feeling tired and irritated    Notice, when using the phone, that you hear better with one ear than the other  [employer] last reviewed this educational content on 1/1/2020 2000-2021 [employer]. All rights reserved. This information is not intended as a substitute for professional medical care. Always follow your healthcare professional's instructions.          Urinary Incontinence, Female (Adult)   Urinary incontinence means loss of bladder control. This problem affects many women, especially as they get older. If you have incontinence, you may be  embarrassed to ask for help. But know that this problem can be treated.   Types of Incontinence  There are different types of incontinence. Two of the main types are described here. You can have more than one type.     Stress incontinence. With this type, urine leaks when pressure (stress) is put on the bladder. This may happen when you cough, sneeze, or laugh. Stress incontinence most often occurs because the pelvic floor muscles that support the bladder and urethra are weak. This can happen after pregnancy and vaginal childbirth or a hysterectomy. It can also be due to excess body weight or hormone changes.    Urge incontinence (also called overactive bladder). With this type, a sudden urge to urinate is felt often. This may happen even though there may not be much urine in the bladder. The need to urinate often during the night is common. Urge incontinence most often occurs because of bladder spasms. This may be due to bladder irritation or infection. Damage to bladder nerves or pelvic muscles, constipation, and certain medicines can also lead to urge incontinence.  Treatment depends on the cause. Further evaluation is needed to find the type you have. This will likely include an exam and certain tests. Based on the results, you and your healthcare provider can then plan treatment. Until a diagnosis is made, the home care tips below can help ease symptoms.   Home care    Do pelvic floor muscle exercises, if they are prescribed. The pelvic floor muscles help support the bladder and urethra. Many women find that their symptoms improve when doing special exercises that strengthen these muscles. To do the exercises, contract the muscles you would use to stop your stream of urine. But do this when you re not urinating. Hold for 10 seconds, then relax. Repeat 10 to 20 times in a row, at least 3 times a day. Your healthcare provider may give you other instructions for how to do the exercises and how often.    Keep a  bladder diary. This helps track how often and how much you urinate over a set period of time. Bring this diary with you to your next visit with the provider. The information can help your provider learn more about your bladder problem.    Lose weight, if advised to by your provider. Extra weight puts pressure on the bladder. Your provider can help you create a weight-loss plan that s right for you. This may include exercising more and making certain diet changes.    Don't have foods and drinks that may irritate the bladder. These can include alcohol and caffeinated drinks.    Quit smoking. Smoking and other tobacco use can lead to a long-term (chronic) cough that strains the pelvic floor muscles. Smoking may also damage the bladder and urethra. Talk with your provider about treatments or methods you can use to quit smoking.    If drinking large amounts of fluid makes you have symptoms, you may be advised to limit your fluid intake. You may also be advised to drink most of your fluids during the day and to limit fluids at night.    If you re worried about urine leakage or accidents, you may wear absorbent pads to catch urine. Change the pads often. This helps reduce discomfort. It may also reduce the risk of skin or bladder infections.    Follow-up care  Follow up with your healthcare provider, or as directed. It may take some to find the right treatment for your problem. But healthy lifestyle changes can be made right away. These include such things as exercising on a regular basis, eating a healthy diet, losing weight (if needed), and quitting smoking. Your treatment plan may include special therapies or medicines. Certain procedures or surgery may also be options. Talk about any questions you have with your provider.   When to seek medical advice  Call the healthcare provider right away if any of these occur:    Fever of 100.4 F (38 C) or higher, or as directed by your provider    Bladder pain or fullness    Belly  swelling    Nausea or vomiting    Back pain    Weakness, dizziness, or fainting  [employer] last reviewed this educational content on 1/1/2020 2000-2021 [employer]. All rights reserved. This information is not intended as a substitute for professional medical care. Always follow your healthcare professional's instructions.          Treating Incontinence in Women: Nonsurgical Methods     The best treatment for you will depend on the type of incontinence you have. Your symptoms, age, and any underlying problems that are found also affect your treatment. Some types of incontinence may over time require surgery. But nonsurgical treatments may be effective in many cases. Nonsurgical treatments include lifestyle changes, muscle-strengthening exercises, and medicines.   Nonsurgical treatments  Treatment for stress urinary incontinence includes:     Bladder training    Lifestyle changes such as weight loss and increased activity if incontinence is due to being overweight    Medicines, if bladder training has not helped    Pelvic floor muscle exercises  Lifestyle changes    Losing weight. Excess weight puts extra pressure on the pelvic floor muscles. Exercising and eating right can help you lose weight. This helps other treatments work better.    Making certain diet changes. Some foods may make you need to urinate more, so it may be good not to have them. These include caffeinated drinks and alcohol. Ask your healthcare provider if these or other diet changes might be helpful.    Quitting smoking. Smoking can lead to a long-term (chronic) cough that strains pelvic floor muscles. Smoking may also damage the bladder and urethra.    Pelvic floor muscle exercises  There are exercises you can do to help strengthen your pelvic floor muscles. The pelvic floor muscles act as a sling to help hold the bladder and urethra in place. These muscles also help keep the urethra closed. Weak pelvic floor muscles may allow  urine to leak. To strengthen the pelvic floor muscles, do the exercises daily. In a few months, the muscles will be stronger and tighter. This can help prevent urine leakage.   [employer] last reviewed this educational content on 9/1/2019 2000-2021 [employer]. All rights reserved. This information is not intended as a substitute for professional medical care. Always follow your healthcare professional's instructions.          Treating Incontinence in Women: Special Therapies      During biofeedback, sensors send signals from your pelvic floor muscles to a computer screen.   Your healthcare provider will discuss your options for treating your urinary incontinence. These depend on the cause of your problem and any other health issues you have. Often behavioral changes are tried first, followed by various medicines. If these methods are unsuccessful, one or more of the therapies described below may be part of your treatment plan.   Biofeedback  This method is taught by a nurse or physical therapist. During the therapy, a small sensor is placed in your vagina or rectum. Another sensor is placed on your stomach. Other types of sensors are also available. These sensors read signals from the pelvic floor muscles. When you contract or relax your muscles, these signals are shown as images on a computer screen. Using the images, you can learn to relax or contract certain muscles. This can help you strengthen and better control these muscles. And it can help you learn pelvic floor muscle exercises.   Electrical stimulation  This is a painless therapy that uses a tiny amount of electric current. It helps strengthen very weak or damaged pelvic floor muscles. The electric current is sent through the muscles of the pelvic floor and bladder. This causes the muscles to contract. In time, this helps make the muscles stronger.   Stimulator implants  This method is used to treat urge incontinence. A small device is  implanted under the skin near the upper buttocks. This device gives off mild electrical signals. These block extra signals that are being sent to the bladder muscle. This helps the bladder work more normally.   [employer] last reviewed this educational content on 6/1/2019 2000-2021 [employer]. All rights reserved. This information is not intended as a substitute for professional medical care. Always follow your healthcare professional's instructions.          Treating Incontinence in Women with Medicine    Urinary incontinence is the leaking of urine from the bladder. In some cases, medicine can reduce or stop the leaking. It's mainly given for urge incontinence. Your healthcare provider will talk with you about your options. Make sure to ask what side effects to expect.   Below are some types of medicines that may help with urge incontinence.   Types of medicine    Anticholinergics or beta-3 adrenergics.  These may increase how much urine the bladder can hold. They may also help relax bladder muscles.    Estrogen. This may help improve muscle tone in the urethra and bladder.    Antibiotics. These are used to treat urinary tract infections.    Botulinum toxin. Injection of botulinum toxin into the bladder muscle is an option when other medicines are not effective.    Tips for taking medicine    Take your medicine on time and as your healthcare provider tell you to.    Tell your healthcare provider if you have any side effects, your dosage may be adjusted if needed.    Be patient. It may take time to find the right dose for you.    Keep a list of the medicines you take. Show it to your healthcare provider and pharmacist before you buy over-the-counter medicines.    [employer] last reviewed this educational content on 9/1/2019 2000-2021 [employer]. All rights reserved. This information is not intended as a substitute for professional medical care. Always follow your healthcare  professional's instructions.          Kegel Exercises  Kegel exercises are done to help strengthen the muscles in your pelvic floor. You don t need special clothing or equipment. They re easy to learn and simple to do. And if you do them right, no one can tell you re doing them, so they can be done almost anywhere. Your healthcare provider, nurse, or physical therapist can answer any questions you have and help you get started.   A weak pelvic floor  The pelvic floor muscles may weaken due to aging, pregnancy and vaginal childbirth, injury, surgery, chronic cough, or lack of exercise. If the pelvic floor is weak, your bladder and other pelvic organs may sag out of place. The urethra may also open too easily and allow urine to leak out. Kegel exercises can help you strengthen your pelvic floor muscles. Then they can better support the pelvic organs and control urine flow.     How Kegel exercises are done  Try each of the Kegel exercises described below. When you re doing them, try not to move your leg, buttock, or stomach muscles:     Contract as if you were stopping your urine stream. But do it when you re not urinating.    Tighten your rectum as if trying not to pass gas. Contract your anus, but don t move your buttocks.    You may place a finger or 2 in the vagina and squeeze your finger with your vagina to learn which muscles to tighten.  Try to hold each Kegel for a slow count to 5. You probably won t be able to hold them for that long at first. But keep practicing. It will get easier as your pelvic floor gets stronger. Eventually, special weights that you place in your vagina may be recommended to help make your Kegels even more effective. Talk to your healthcare provider if you have trouble doing Kegel exercises.   Helpful tips  Here are some tips to follow:    Do your Kegels as often as you can. The more you do them, the faster you ll feel the results.    Pick an activity you do often as a reminder. For  instance, do your Kegels every time you sit down.    Tighten your pelvic floor before you sneeze, get up from a chair, cough, laugh, or lift. This can help prevent urine, gas, or stool leakage.  [employer] last reviewed this educational content on 8/1/2020 2000-2021 [employer]. All rights reserved. This information is not intended as a substitute for professional medical care. Always follow your healthcare professional's instructions.        Your Health Risk Assessment indicates you feel you are not in good emotional health.    Recreation   Recreation is not limited to sports and team events. It includes any activity that provides relaxation, interest, enjoyment, and exercise. Recreation provides an outlet for physical, mental, and social energy. It can give a sense of worth and achievement. It can help you stay healthy.    Mental Exercise and Social Involvement  Mental and emotional health is as important as physical health. Keep in touch with friends and family. Stay as active as possible. Continue to learn and challenge yourself.   Things you can do to stay mentally active are:    Learn something new, like a foreign language or musical instrument.     Play SCRABBLE or do crossword puzzles. If you cannot find people to play these games with you at home, you can play them with others on your computer through the Internet.     Join a games club--anything from card games to chess or checkers or lawn bowling.     Start a new hobby.     Go back to school.     Volunteer.     Read.   Keep up with world events.

## 2021-09-30 NOTE — PROGRESS NOTES
"SUBJECTIVE:   Nika Navarrete is a 75 year old female who presents for Preventive Visit.    Patient has been advised of split billing requirements and indicates understanding: Yes   Are you in the first 12 months of your Medicare coverage?  No    Healthy Habits:     In general, how would you rate your overall health?  Good    Frequency of exercise:  None    Do you usually eat at least 4 servings of fruit and vegetables a day, include whole grains    & fiber and avoid regularly eating high fat or \"junk\" foods?  Yes    Taking medications regularly:  No    Medication side effects:  None    Ability to successfully perform activities of daily living:  No assistance needed    Home Safety:  No safety concerns identified    Hearing Impairment:  Difficulty following a conversation in a noisy restaurant or crowded room, feel that people are mumbling or not speaking clearly, difficulty following dialogue in the theater, difficult to understand a speaker at a public meeting or [employer] service, need to ask people to speak up or repeat themselves, difficulty understanding soft or whispered speech and difficulty understanding speech on the telephone    In the past 6 months, have you been bothered by leaking of urine? Yes    In general, how would you rate your overall mental or emotional health?  Fair      PHQ-2 Total Score: 3    Additional concerns today:  Yes    Do you feel safe in your environment? Yes    Have you ever done Advance Care Planning? (For example, a Health Directive, POLST, or a discussion with a medical provider or your loved ones about your wishes): No, advance care planning information given to patient to review.  Patient plans to discuss their wishes with loved ones or provider.      Fall risk  Fallen 2 or more times in the past year?: No  Any fall with injury in the past year?: No    Cognitive Screening   1) Repeat 3 items (Leader, Season, Table)    2) Clock draw: NORMAL  3) 3 item recall: Recalls 3 " objects  Results: 3 items recalled: COGNITIVE IMPAIRMENT LESS LIKELY    Mini-CogTM Copyright RIO Salazar. Licensed by the author for use in [facility]; reprinted with permission (ovidio@.[facility]). All rights reserved.      Do you have sleep apnea, excessive snoring or daytime drowsiness?: yes    Reviewed and updated as needed this visit by clinical staff   Allergies  Meds              Reviewed and updated as needed this visit by Provider                Social History     Tobacco Use     Smoking status: Former Smoker     Packs/day: 2.00     Years: 40.00     Pack years: 80.00     Types: Cigarettes     Quit date: 6/3/2007     Years since quittin.3     Smokeless tobacco: Never Used     Tobacco comment: quit    Substance Use Topics     Alcohol use: No     Comment: occasional       Alcohol Use 2013   Prescreen: >3 drinks/day or >7 drinks/week? The patient does not drink >3 drinks per day nor >7 drinks per week.     PROBLEMS TO ADD ON...  Chief Complaint   Patient presents with     Wellness Visit     Fasting labs     Recent CT abdomen showed severe diverticulosis. No longer bleeding.    She doesn't want a colonoscopy because she cannot drink that much prep.   Wants to increase blood pressure med  Has been feeling tired. Wants to increase her antidepressant because she has been feeling down. She likes to eat.   Lung function test and CXR haven't explained her ongoing dypsnea. Hasn't worsened. EKG normal.     Current providers sharing in care for this patient include:   Patient Care Team:  Paula Weaver MD as PCP - General  Paula Weaver MD as Assigned PCP    The following health maintenance items are reviewed in Epic and correct as of today:  Health Maintenance Due   Topic Date Due     ZOSTER IMMUNIZATION (1 of 2) Never done     Pneumococcal Vaccine: 65+ Years (1 of 1 - PPSV23) Never done     COLORECTAL CANCER SCREENING  2012     DTAP/TDAP/TD IMMUNIZATION (3 - Td or Tdap)  "06/02/2021     INFLUENZA VACCINE (1) Never done     FALL RISK ASSESSMENT  09/18/2021     MEDICARE ANNUAL WELLNESS VISIT  09/18/2021     Lab work is in process  Declined vaccine.     Mammogram Screening - Patient over age 75, has elected to discontinue screenings.  Pertinent mammograms are reviewed under the imaging tab.    Review of Systems  Constitutional, HEENT, cardiovascular, pulmonary, GI, , musculoskeletal, neuro, skin, endocrine and psych systems are negative, except as otherwise noted.    OBJECTIVE:   BP (!) 150/76 (BP Location: Left arm, Patient Position: Sitting, Cuff Size: Adult Large)   Pulse 58   Resp 16   Ht 1.594 m (5' 2.75\")   Wt 76.7 kg (169 lb 3.2 oz)   BMI 30.21 kg/m   Estimated body mass index is 30.56 kg/m  as calculated from the following:    Height as of 9/18/20: 1.588 m (5' 2.5\").    Weight as of 7/22/21: 77 kg (169 lb 12.8 oz).  Physical Exam  GENERAL APPEARANCE: healthy, alert and no distress  EYES: Eyes grossly normal to inspection, PERRL and conjunctivae and sclerae normal  HENT: grossly normal.   NECK: no adenopathy, no asymmetry, masses, or scars and thyroid normal to palpation  RESP: lungs clear to auscultation - no rales, rhonchi or wheezes  CV: regular rate and rhythm, normal S1 S2, no S3 or S4, no murmur, click or rub, no peripheral edema and peripheral pulses strong  ABDOMEN: soft, nontender, no hepatosplenomegaly, no masses and bowel sounds normal  MS: no musculoskeletal defects are noted and gait is age appropriate without ataxia  SKIN: no suspicious lesions or rashes  NEURO: Normal strength and tone, sensory exam grossly normal, mentation intact and speech normal  PSYCH: mentation appears normal and affect normal/bright    Diagnostic Test Results:  Labs reviewed in Epic    ASSESSMENT / PLAN:   Nika was seen today for wellness visit.    Diagnoses and all orders for this visit:    Encounter for general adult medical examination with abnormal findings  Immunizations " reviewed --- declined all immunization   Colon cancer screen - normal FIT 2019, repeat this year, order placed again today.   Mammography reviewed--normal 2019, repeat next year as she wants to hold off for right now due to covid19  Pap reviewed --  no need to repeat based on age and risk factors.   DEXA - repeat this year. Ordered for her.   Routine Dental and Eye care recommended  Discussed importance of regular exercise and appropriate calcium intake  Discussed Advance Directives--patient given another copy of honoring choices.   -     CBC with platelets; Future  -     CBC with platelets    Essential familial hypercholesterolemia  -     Lipid panel; Future  -     atorvastatin (LIPITOR) 20 MG tablet; Take 1 tablet (20 mg) by mouth At Bedtime  -     Lipid panel    Essential hypertension  Not well controlled. Increased lisinopril to 30 mg daily. rtc in 2 weeks nurse only BP check. F/u in 6 months.   -     lisinopril (ZESTRIL) 30 MG tablet; Take 1 tablet (30 mg) by mouth daily    Mild major depression (H)  Fatigued. Mildly worsening. Will increase to 20 mg daily.   -     FLUoxetine (PROZAC) 10 MG capsule; Take 2 capsules (20 mg) by mouth daily    Personal history of tobacco use  Quit 14 years ago. Not qualified for CT low dose annual chest.     Vitamin D deficiency  -     Vitamin D Deficiency; Future  -     Vitamin D Deficiency    Osteopenia after menopause  Postmenopausal status  Due for dexa. Ordered for her.   -     Comprehensive metabolic panel (BMP + Alb, Alk Phos, ALT, AST, Total. Bili, TP); Future  -     DX Hip/Pelvis/Spine; Future  -     Comprehensive metabolic panel (BMP + Alb, Alk Phos, ALT, AST, Total. Bili, TP)    Diverticulosis of large intestine with hemorrhage  Episode of severe bleeding a few months ago, resolved now. Severe diverticulosis per CT scan. Monitor for now. She declines colonoscopy as she cannot drink the prep.   -     DX Hip/Pelvis/Spine; Future    Screen for colon cancer  -     Fecal  "colorectal cancer screen (FIT); Future    Patient has been advised of split billing requirements and indicates understanding: Yes     COUNSELING:  Reviewed preventive health counseling, as reflected in patient instructions       Regular exercise       Healthy diet/nutrition       Vision screening       Hearing screening       Dental care       Osteoporosis prevention/bone health       Colon cancer screening       Advanced Planning     Estimated body mass index is 30.56 kg/m  as calculated from the following:    Height as of 9/18/20: 1.588 m (5' 2.5\").    Weight as of 7/22/21: 77 kg (169 lb 12.8 oz).        She reports that she quit smoking about 14 years ago. Her smoking use included cigarettes. She has a 80.00 pack-year smoking history. She has never used smokeless tobacco.      Appropriate preventive services were discussed with this patient, including applicable screening as appropriate for cardiovascular disease, diabetes, osteopenia/osteoporosis, and glaucoma.  As appropriate for age/gender, discussed screening for colorectal cancer, prostate cancer, breast cancer, and cervical cancer. Checklist reviewing preventive services available has been given to the patient.    Reviewed patients plan of care and provided an AVS. The Basic Care Plan (routine screening as documented in Health Maintenance) for Nika meets the Care Plan requirement. This Care Plan has been established and reviewed with the Patient.    Counseling Resources:  ATP IV Guidelines  Pooled Cohorts Equation Calculator  Breast Cancer Risk Calculator  Breast Cancer: Medication to Reduce Risk  FRAX Risk Assessment  ICSI Preventive Guidelines  Dietary Guidelines for Americans, 2010  USDA's MyPlate  ASA Prophylaxis  Lung CA Screening    Paula Weaver MD  [facility]    Identified Health Risks:  Answers for HPI/ROS submitted by the patient on 9/30/2021  If you checked off any problems, how difficult have these " problems made it for you to do your work, take care of things at home, or get along with other people?: Not difficult at all  PHQ9 TOTAL SCORE: 8        She is at risk for lack of exercise and has been provided with information to increase physical activity for the benefit of her well-being.  The patient was provided with written information regarding signs of hearing loss.  Information on urinary incontinence and treatment options given to patient.  The patient was provided with suggestions to help her develop a healthy emotional lifestyle.

## 2021-10-01 LAB — DEPRECATED CALCIDIOL+CALCIFEROL SERPL-MC: 37 UG/L (ref 30–80)

## 2021-10-01 ASSESSMENT — PATIENT HEALTH QUESTIONNAIRE - PHQ9: SUM OF ALL RESPONSES TO PHQ QUESTIONS 1-9: 8

## 2021-10-28 ENCOUNTER — ALLIED HEALTH/NURSE VISIT (OUTPATIENT)
Dept: FAMILY MEDICINE | Facility: CLINIC | Age: 76
End: 2021-10-28
Payer: COMMERCIAL

## 2021-10-28 VITALS — HEART RATE: 75 BPM | DIASTOLIC BLOOD PRESSURE: 78 MMHG | SYSTOLIC BLOOD PRESSURE: 131 MMHG

## 2021-10-28 DIAGNOSIS — I10 ESSENTIAL HYPERTENSION: Primary | ICD-10-CM

## 2021-10-28 PROCEDURE — 99207 PR NO CHARGE NURSE ONLY: CPT

## 2021-10-28 NOTE — PROGRESS NOTES
Follow Up Blood Pressure Check    Nika Navarrete is a 75 year old female recommended to follow up for blood pressure check by Paula Weaver. Anihypertensive medications and adherence were verified: Yes.     Reason for visit:     Medication change at last visit:     Today's Vitals:   Vitals:    10/28/21 1025   BP: 131/78   BP Location: Left arm   Patient Position: Sitting   Cuff Size: Adult Regular   Pulse: 75       Home blood pressure readings brought in today:       Lowest blood pressure today is 131/78 and they deny signs or symptoms of new onset: denies, severe headache, fatigue, confusion, vision changes, chest pain, pounding in the chest, neck, ears, irregular heartbeat, difficulty breathing and blood in the urine.  Please inform patient of his/her blood pressure today.  If they are asymptomatic, the patient is to continue current medications.  This message will be routed to their provider, and they will be notified if a change in medication is recommended.        Saray Rasheed    Current Outpatient Medications   Medication Sig Dispense Refill     ACETAMINOPHEN EXTRA STRENGTH PO Take  by mouth. 400 mg       atorvastatin (LIPITOR) 20 MG tablet Take 1 tablet (20 mg) by mouth At Bedtime 90 tablet 3     FLUoxetine (PROZAC) 10 MG capsule Take 2 capsules (20 mg) by mouth daily       lisinopril (ZESTRIL) 30 MG tablet Take 1 tablet (30 mg) by mouth daily 90 tablet 1

## 2022-01-03 DIAGNOSIS — E78.01 ESSENTIAL FAMILIAL HYPERCHOLESTEROLEMIA: ICD-10-CM

## 2022-01-03 RX ORDER — ATORVASTATIN CALCIUM 20 MG/1
20 TABLET, FILM COATED ORAL AT BEDTIME
Qty: 90 TABLET | Refills: 3 | Status: SHIPPED | OUTPATIENT
Start: 2022-01-03 | End: 2022-04-21

## 2022-02-07 DIAGNOSIS — I10 ESSENTIAL HYPERTENSION: ICD-10-CM

## 2022-02-07 RX ORDER — LISINOPRIL 30 MG/1
30 TABLET ORAL DAILY
Qty: 90 TABLET | Refills: 1 | Status: SHIPPED | OUTPATIENT
Start: 2022-02-07 | End: 2022-07-04

## 2022-02-07 NOTE — TELEPHONE ENCOUNTER
Reason for Call:  Medication or medication refill:    Do you use a [facility]?  Name of the pharmacy and phone number for the current request:    [id number] - [facility], [place] - [address] [place] [address]    Name of the medication requested:     lisinopril (ZESTRIL) 30 MG tablet    Other request: na    Can we leave a detailed message on this number? YES    Phone number patient can be reached at: Home number on file [phone] (home)    Best Time: any    Call taken on 2/7/2022 at 9:38 AM by CASANDRA BRYANT

## 2022-04-14 ENCOUNTER — OFFICE VISIT (OUTPATIENT)
Dept: FAMILY MEDICINE | Facility: CLINIC | Age: 77
End: 2022-04-14
Payer: COMMERCIAL

## 2022-04-14 VITALS
WEIGHT: 171.2 LBS | RESPIRATION RATE: 18 BRPM | DIASTOLIC BLOOD PRESSURE: 74 MMHG | HEART RATE: 60 BPM | BODY MASS INDEX: 30.57 KG/M2 | SYSTOLIC BLOOD PRESSURE: 147 MMHG

## 2022-04-14 DIAGNOSIS — E55.9 VITAMIN D DEFICIENCY: ICD-10-CM

## 2022-04-14 DIAGNOSIS — Z86.0100 HISTORY OF COLONIC POLYPS: ICD-10-CM

## 2022-04-14 DIAGNOSIS — F32.0 MILD MAJOR DEPRESSION (H): Primary | ICD-10-CM

## 2022-04-14 DIAGNOSIS — N39.3 FEMALE STRESS INCONTINENCE: ICD-10-CM

## 2022-04-14 DIAGNOSIS — Z78.0 OSTEOPENIA AFTER MENOPAUSE: ICD-10-CM

## 2022-04-14 DIAGNOSIS — I10 PRIMARY HYPERTENSION: ICD-10-CM

## 2022-04-14 DIAGNOSIS — E78.5 HYPERLIPIDEMIA LDL GOAL <130: ICD-10-CM

## 2022-04-14 DIAGNOSIS — M85.80 OSTEOPENIA AFTER MENOPAUSE: ICD-10-CM

## 2022-04-14 LAB
ALBUMIN SERPL-MCNC: 4 G/DL (ref 3.5–5)
ALP SERPL-CCNC: 81 U/L (ref 45–120)
ALT SERPL W P-5'-P-CCNC: 26 U/L (ref 0–45)
ANION GAP SERPL CALCULATED.3IONS-SCNC: 11 MMOL/L (ref 5–18)
AST SERPL W P-5'-P-CCNC: 18 U/L (ref 0–40)
BILIRUB SERPL-MCNC: 0.6 MG/DL (ref 0–1)
BUN SERPL-MCNC: 15 MG/DL (ref 8–28)
CALCIUM SERPL-MCNC: 11.1 MG/DL (ref 8.5–10.5)
CHLORIDE BLD-SCNC: 102 MMOL/L (ref 98–107)
CHOLEST SERPL-MCNC: 185 MG/DL
CO2 SERPL-SCNC: 27 MMOL/L (ref 22–31)
CREAT SERPL-MCNC: 0.73 MG/DL (ref 0.6–1.1)
ERYTHROCYTE [DISTWIDTH] IN BLOOD BY AUTOMATED COUNT: 14.2 % (ref 10–15)
FASTING STATUS PATIENT QL REPORTED: YES
GFR SERPL CREATININE-BSD FRML MDRD: 84 ML/MIN/1.73M2
GLUCOSE BLD-MCNC: 84 MG/DL (ref 70–125)
HCT VFR BLD AUTO: 41.9 % (ref 35–47)
HDLC SERPL-MCNC: 46 MG/DL
HGB BLD-MCNC: 13.7 G/DL (ref 11.7–15.7)
LDLC SERPL CALC-MCNC: 112 MG/DL
MCH RBC QN AUTO: 28.6 PG (ref 26.5–33)
MCHC RBC AUTO-ENTMCNC: 32.7 G/DL (ref 31.5–36.5)
MCV RBC AUTO: 88 FL (ref 78–100)
PLATELET # BLD AUTO: 218 10E3/UL (ref 150–450)
POTASSIUM BLD-SCNC: 4.4 MMOL/L (ref 3.5–5)
PROT SERPL-MCNC: 6.9 G/DL (ref 6–8)
PTH-INTACT SERPL-MCNC: 49 PG/ML (ref 10–86)
RBC # BLD AUTO: 4.79 10E6/UL (ref 3.8–5.2)
SODIUM SERPL-SCNC: 140 MMOL/L (ref 136–145)
TRIGL SERPL-MCNC: 134 MG/DL
WBC # BLD AUTO: 8 10E3/UL (ref 4–11)

## 2022-04-14 PROCEDURE — 99214 OFFICE O/P EST MOD 30 MIN: CPT | Performed by: FAMILY MEDICINE

## 2022-04-14 PROCEDURE — 83970 ASSAY OF PARATHORMONE: CPT | Performed by: FAMILY MEDICINE

## 2022-04-14 PROCEDURE — 80061 LIPID PANEL: CPT | Performed by: FAMILY MEDICINE

## 2022-04-14 PROCEDURE — 80053 COMPREHEN METABOLIC PANEL: CPT | Performed by: FAMILY MEDICINE

## 2022-04-14 PROCEDURE — 36415 COLL VENOUS BLD VENIPUNCTURE: CPT | Performed by: FAMILY MEDICINE

## 2022-04-14 PROCEDURE — 85027 COMPLETE CBC AUTOMATED: CPT | Performed by: FAMILY MEDICINE

## 2022-04-14 ASSESSMENT — ANXIETY QUESTIONNAIRES
2. NOT BEING ABLE TO STOP OR CONTROL WORRYING: NOT AT ALL
1. FEELING NERVOUS, ANXIOUS, OR ON EDGE: SEVERAL DAYS
7. FEELING AFRAID AS IF SOMETHING AWFUL MIGHT HAPPEN: NOT AT ALL
4. TROUBLE RELAXING: NOT AT ALL
5. BEING SO RESTLESS THAT IT IS HARD TO SIT STILL: NOT AT ALL
6. BECOMING EASILY ANNOYED OR IRRITABLE: NOT AT ALL
GAD7 TOTAL SCORE: 2
GAD7 TOTAL SCORE: 2
3. WORRYING TOO MUCH ABOUT DIFFERENT THINGS: SEVERAL DAYS
7. FEELING AFRAID AS IF SOMETHING AWFUL MIGHT HAPPEN: NOT AT ALL
GAD7 TOTAL SCORE: 2

## 2022-04-14 ASSESSMENT — PATIENT HEALTH QUESTIONNAIRE - PHQ9
SUM OF ALL RESPONSES TO PHQ QUESTIONS 1-9: 9
SUM OF ALL RESPONSES TO PHQ QUESTIONS 1-9: 9
10. IF YOU CHECKED OFF ANY PROBLEMS, HOW DIFFICULT HAVE THESE PROBLEMS MADE IT FOR YOU TO DO YOUR WORK, TAKE CARE OF THINGS AT HOME, OR GET ALONG WITH OTHER PEOPLE: NOT DIFFICULT AT ALL

## 2022-04-14 NOTE — PROGRESS NOTES
Assessment & Plan     Mild major depression (H)  Stable. Continue with current dose of fluoxetine 20 mg daily.    Hyperlipidemia LDL goal <130  Stable.  Lab today.  Does not complain of any  - Lipid panel  - Lipid panel    Primary hypertension  Not well controlled.  Return in 2 weeks for nurse only blood pressure check before further adjustment of blood pressure medication.  She does not check her blood pressure at home.  She reports that she does miss her medication about once a week  - Comprehensive metabolic panel (BMP + Alb, Alk Phos, ALT, AST, Total. Bili, TP)  - CBC with platelets  - Comprehensive metabolic panel (BMP + Alb, Alk Phos, ALT, AST, Total. Bili, TP)  - CBC with platelets    History of colonic polyps  Did not want to do colonoscopy.    Osteopenia after menopause  Does not want to do bone density right now.  - Parathyroid Hormone Intact  - Parathyroid Hormone Intact    Vitamin D deficiency   Last check was normal.    Female stress incontinence  Reports stress incontinence with sneezing and coughing.  We will send her to urogynecology for further evaluation and treatment discussion.  She does not want to try any medications first.  - Adult Urology Referral  - UA Macro with Reflex to Micro and Culture - lab collect      Return in about 6 months (around 10/14/2022) for Routine preventive, with me, in person.    Paula Weaver MD  [facility]    Alycia Rivera is a 76 year old who presents for the following health issues     History of Present Illness       Mental Health Follow-up:  Patient presents to follow-up on Depression & Anxiety.Patient's depression since last visit has been:  Better  The patient is not having other symptoms associated with depression.  Patient's anxiety since last visit has been:  Better  The patient is not having other symptoms associated with anxiety.  Any significant life events: No  Patient is not feeling anxious or having panic  attacks.  Patient has no concerns about alcohol or drug use.       Today's PHQ-9         PHQ-9 Total Score: 9  PHQ-9 Q9 Thoughts of better off dead/self-harm past 2 weeks :   (P) Not at all    How difficult have these problems made it for you to do your work, take care of things at home, or get along with other people: Not difficult at all    Today's RAMILA-7 Score: 2    Hyperlipidemia:  She presents for follow up of hyperlipidemia.  She is taking medication to lower cholesterol. She is not having myalgia or other side effects to statin medications.    Hypertension: She presents for follow up of hypertension.  She does not check blood pressure  regularly outside of the clinic. Outpatient blood pressures have not been over 140/90. She does not follow a low salt diet.     She eats 2-3 servings of fruits and vegetables daily.She consumes 1 sweetened beverage(s) daily.She exercises with enough effort to increase her heart rate 9 or less minutes per day.  She exercises with enough effort to increase her heart rate 3 or less days per week. She is missing 1 dose(s) of medications per week.     Chief Complaint   Patient presents with     Recheck Medication     No concerns     Lisinopril was increased to 30 mg daily at that visit as well as fluoxetine was increased to 20 mg daily.  She has declined colonoscopy in the past as she chondroitin prep even though there was severe diverticulosis per CT scan last year.  Will need to discuss another mammogram this year.  Will need to discuss bone density again this year. But she declines both of these.   Has no question today  Didn't have one of her hearing aids so it was difficult to hear.   Doesn't check her BP at home.   She forgets her meds about once a week.     Review of Systems   Constitutional, HEENT, cardiovascular, pulmonary, GI, , musculoskeletal, neuro, skin, endocrine and psych systems are negative, except as otherwise noted.      Objective    BP (!) 147/74   Pulse 60    Resp 18   Wt 77.7 kg (171 lb 3.2 oz)   BMI 30.57 kg/m    Body mass index is 30.57 kg/m .  Physical Exam   GENERAL: healthy, alert and no distress  NECK: no adenopathy, no asymmetry, masses, or scars and thyroid normal to palpation  RESP: lungs clear to auscultation - no rales, rhonchi or wheezes  CV: regular rate and rhythm, normal S1 S2, no S3 or S4, no murmur, click or rub, no peripheral edema and peripheral pulses strong  NEURO: Normal strength and tone, mentation intact and speech normal  PSYCH: mentation appears normal, affect normal/bright

## 2022-04-15 DIAGNOSIS — E83.52 HYPERCALCEMIA: Primary | ICD-10-CM

## 2022-04-15 ASSESSMENT — ANXIETY QUESTIONNAIRES: GAD7 TOTAL SCORE: 2

## 2022-04-15 ASSESSMENT — PATIENT HEALTH QUESTIONNAIRE - PHQ9: SUM OF ALL RESPONSES TO PHQ QUESTIONS 1-9: 9

## 2022-04-19 ENCOUNTER — TELEPHONE (OUTPATIENT)
Dept: FAMILY MEDICINE | Facility: CLINIC | Age: 77
End: 2022-04-19
Payer: COMMERCIAL

## 2022-04-19 DIAGNOSIS — E78.01 ESSENTIAL FAMILIAL HYPERCHOLESTEROLEMIA: ICD-10-CM

## 2022-04-19 NOTE — TELEPHONE ENCOUNTER
Incoming call from patient returning Melanie's call. Relayed Dr. Weaver' message to patient. She states understanding and she is OK for the increase of the atorvastatin. Pt OKed for provider to send to pharmacy.

## 2022-04-19 NOTE — TELEPHONE ENCOUNTER
Left message to call back for: Results  Information to relay to patient: LMTCB, Please see message below from provider

## 2022-04-19 NOTE — TELEPHONE ENCOUNTER
----- Message from Paula Weaver MD sent at 4/15/2022  9:47 AM CDT -----  Please call patient:   Lab is overall okay however her cholesterol is a little bit higher this time compared to 6 months ago.  I think we should increase her atorvastatin to 40 mg at bedtime.  Let me know if she agrees and I will change her prescription.  Her calcium is little bit high however no worries for now; I will recheck in 6 months when she comes back. Let me know what questions she has  Please send result letter with above message as well.  She dislikes [employer].

## 2022-04-21 RX ORDER — ATORVASTATIN CALCIUM 40 MG/1
40 TABLET, FILM COATED ORAL AT BEDTIME
Qty: 90 TABLET | Refills: 1 | Status: SHIPPED | OUTPATIENT
Start: 2022-04-21 | End: 2022-10-18

## 2022-04-22 DIAGNOSIS — N39.46 MIXED INCONTINENCE: Primary | ICD-10-CM

## 2022-04-25 ENCOUNTER — NURSE TRIAGE (OUTPATIENT)
Dept: NURSING | Facility: CLINIC | Age: 77
End: 2022-04-25
Payer: COMMERCIAL

## 2022-04-25 NOTE — TELEPHONE ENCOUNTER
Daughter had COVID19 and she was exposed to her on 4/19/22  NO symptoms.   She did not want triage advice to home test for COVID day 5 post exposure.    She wanted to change her appointment.   Transferred to scheduling.      Reason for Disposition    [1] CLOSE CONTACT COVID-19 EXPOSURE within last 14 days AND [2] NO symptoms    Additional Information    Negative: COVID-19 lab test positive    Negative: [1] Lives with someone known to have influenza (flu test positive) AND [2] flu-like symptoms (e.g., cough, runny nose, sore throat, SOB; with or without fever)    Negative: [1] Symptoms of COVID-19 (e.g., cough, fever, SOB, or others) AND [2] doctor (or NP/PA) diagnosed COVID-19 based on symptoms    Negative: [1] Symptoms of COVID-19 (e.g., cough, fever, SOB, or others) AND [2] lives in an area with community spread    Negative: [1] Symptoms of COVID-19 (e.g., cough, fever, SOB, or others) AND [2] within 14 days of EXPOSURE (close contact) with diagnosed or suspected COVID-19 patient    Negative: [1] Symptoms of COVID-19 (e.g., cough, fever, SOB, or others) AND [2] within 14 days of travel from high-risk area for COVID-19 community spread (identified by CDC)    Negative: [1] Difficulty breathing (shortness of breath) occurs AND [2] onset > 14 days after COVID-19 EXPOSURE (Close Contact)    Negative: [1] Cough occurs AND [2] onset > 14 days after COVID-19 EXPOSURE    Negative: [1] Common cold symptoms AND [2] onset > 14 days after COVID-19 EXPOSURE    Negative: COVID-19 vaccine reaction suspected (e.g., fever, headache, muscle aches) occurring during days 1-3 after getting vaccine    Negative: COVID-19 vaccine, questions about    Negative: [1] CLOSE CONTACT COVID-19 EXPOSURE within last 14 days AND [2] needs COVID-19 lab test to return to work AND [3] NO symptoms    Negative: [1] CLOSE CONTACT COVID-19 EXPOSURE within last 14 days AND [2] exposed person is a healthcare worker who was NOT using all recommended personal  protective equipment (e.g., a respirator-N95 mask, eye protection, gloves, and gown) AND [3] NO symptoms    Negative: [1] CLOSE CONTACT COVID-19 EXPOSURE within last 14 days AND [2] exposed person is a  (e.g., police or paramedic) AND [3] NO symptoms    Negative: [1] Living or working in a correctional facility, long-term care facility, or shelter (i.e., congregate setting; densely populated) AND [2] where an outbreak has occurred AND [3] NO symptoms    Negative: [1] CLOSE CONTACT COVID-19 EXPOSURE within last 14 days AND [2] weak immune system (e.g., HIV positive, cancer chemo, splenectomy, organ transplant, chronic steroids) AND [3] NO symptoms    Protocols used: CORONAVIRUS (COVID-19) EXPOSURE-A-OH 1.18.2022

## 2022-04-28 ENCOUNTER — ALLIED HEALTH/NURSE VISIT (OUTPATIENT)
Dept: FAMILY MEDICINE | Facility: CLINIC | Age: 77
End: 2022-04-28
Payer: COMMERCIAL

## 2022-04-28 VITALS — HEART RATE: 63 BPM | SYSTOLIC BLOOD PRESSURE: 159 MMHG | DIASTOLIC BLOOD PRESSURE: 80 MMHG

## 2022-04-28 DIAGNOSIS — I10 ESSENTIAL HYPERTENSION: Primary | ICD-10-CM

## 2022-04-28 PROCEDURE — 99207 PR NO CHARGE NURSE ONLY: CPT

## 2022-04-28 NOTE — PROGRESS NOTES
Follow Up Blood Pressure Check    Nika Navarrete is a 76 year old female recommended to follow up for blood pressure check by Paula Weaver. Anihypertensive medications and adherence were verified: Yes.     Reason for visit: Check bp, started lisinopril    Medication change at last visit: started lisinopril  Today's Vitals:   Vitals:    04/28/22 1001 04/28/22 1013   BP: (!) 164/84 (!) 159/80   BP Location: Right arm Right arm   Patient Position: Sitting Sitting   Cuff Size: Adult Small Adult Regular   Pulse: 58 63         Lowest blood pressure today is 159/80 and they deny signs or symptoms of new onset: severe headache, fatigue, confusion, vision changes, chest pain, pounding in the chest, neck, ears, irregular heartbeat, difficulty breathing and blood in the urine.  Please inform patient of his/her blood pressure today.  If they are asymptomatic, the patient is to continue current medications.  This message will be routed to their provider, and they will be notified if a change in medication is recommended.    FYI: Patient states that she forgot to take her bp meds the last few days. She is going to schedule another bp check in the next week or two to recheck her bp.    Melanie Long    Current Outpatient Medications   Medication Sig Dispense Refill     ACETAMINOPHEN EXTRA STRENGTH PO Take  by mouth. 400 mg       atorvastatin (LIPITOR) 40 MG tablet Take 1 tablet (40 mg) by mouth At Bedtime 90 tablet 1     FLUoxetine (PROZAC) 10 MG capsule Take 2 capsules (20 mg) by mouth daily       lisinopril (ZESTRIL) 30 MG tablet Take 1 tablet (30 mg) by mouth daily 90 tablet 1

## 2022-05-27 ENCOUNTER — ALLIED HEALTH/NURSE VISIT (OUTPATIENT)
Dept: FAMILY MEDICINE | Facility: CLINIC | Age: 77
End: 2022-05-27
Payer: COMMERCIAL

## 2022-05-27 VITALS — DIASTOLIC BLOOD PRESSURE: 86 MMHG | HEART RATE: 67 BPM | SYSTOLIC BLOOD PRESSURE: 134 MMHG

## 2022-05-27 DIAGNOSIS — I10 ESSENTIAL HYPERTENSION: Primary | ICD-10-CM

## 2022-05-27 PROCEDURE — 99207 PR NO CHARGE NURSE ONLY: CPT

## 2022-05-27 NOTE — PROGRESS NOTES
I met with Nika Navarrete at the request of Dr. Weaver to recheck her blood pressure.  Blood pressure medications on the med list were reviewed with patient.    Patient has taken all medications as per usual regimen: Yes  Patient reports tolerating them without any issues or concerns: Yes    Vitals:    05/27/22 1019 05/27/22 1033   BP: (!) 148/84 134/86   BP Location: Left arm    Patient Position: Sitting    Cuff Size: Adult Regular    Pulse: 69 67       After 5 minutes, the patient's blood pressure was <140/90, the previous encounter was reviewed, recorded blood pressure below 140/90. Patient was discharged and the note will be sent to the provider for final review.    Pt stated that she has been experiencing vertigo. She tried to get an appt with a provider in this clinic but was told there were no openings. She said she was told by her providers nurse to go to [facility] but the wait was 2.5 hours and she didn't want to sit in their waiting room for that long. I did look to see if there were any openings at this clinic this week and next week. But there aren't any openings at this time. I also checked [facility] and they didn't have any openings either. She said she would try the [facility].

## 2022-05-31 NOTE — PROGRESS NOTES
BP better. No change in BP meds. Please give me an update on her vertigo. It looks like she went to ED and brain imaging was unremarkable.

## 2022-06-01 ENCOUNTER — TELEPHONE (OUTPATIENT)
Dept: FAMILY MEDICINE | Facility: CLINIC | Age: 77
End: 2022-06-01

## 2022-06-01 NOTE — TELEPHONE ENCOUNTER
Patient calling stating that her vertigo is also gone. She feels almost normal again. Patient states that she was given Meclizine and that helped dramatically with the vertigo.        Paula Weaver MD at 5/27/2022 10:10 AM    Status: Signed      BP better. No change in BP meds. Please give me an update on her vertigo. It looks like she went to ED and brain imaging was unremarkable.

## 2022-06-13 ENCOUNTER — TELEPHONE (OUTPATIENT)
Dept: FAMILY MEDICINE | Facility: CLINIC | Age: 77
End: 2022-06-13
Payer: COMMERCIAL

## 2022-06-13 NOTE — TELEPHONE ENCOUNTER
Returned patient's call and she states she thinks she has chronic fatigue syndrome.  Pt scheduled 6/23/22 to discuss with Dr. Weaver.

## 2022-06-13 NOTE — TELEPHONE ENCOUNTER
Patient would like call back.  She says she thinks she knows why she was having dizziness.  She doesn't think it was vertigo.    Please call patient.  She says she would like to talk to Dr Weaver' assistant.

## 2022-06-23 ENCOUNTER — OFFICE VISIT (OUTPATIENT)
Dept: FAMILY MEDICINE | Facility: CLINIC | Age: 77
End: 2022-06-23
Payer: COMMERCIAL

## 2022-06-23 VITALS
SYSTOLIC BLOOD PRESSURE: 128 MMHG | HEART RATE: 72 BPM | RESPIRATION RATE: 20 BRPM | WEIGHT: 167.4 LBS | DIASTOLIC BLOOD PRESSURE: 78 MMHG | BODY MASS INDEX: 29.89 KG/M2

## 2022-06-23 DIAGNOSIS — F32.0 MILD MAJOR DEPRESSION (H): Primary | ICD-10-CM

## 2022-06-23 DIAGNOSIS — R82.90 FOUL SMELLING URINE: ICD-10-CM

## 2022-06-23 DIAGNOSIS — G47.10 HYPERSOMNIA: ICD-10-CM

## 2022-06-23 DIAGNOSIS — R82.90 FOUL SMELLING URINE: Primary | ICD-10-CM

## 2022-06-23 DIAGNOSIS — R42 VERTIGO: ICD-10-CM

## 2022-06-23 DIAGNOSIS — R53.83 OTHER FATIGUE: ICD-10-CM

## 2022-06-23 LAB
ALBUMIN SERPL-MCNC: 4.1 G/DL (ref 3.5–5)
ALBUMIN UR-MCNC: NEGATIVE MG/DL
ALP SERPL-CCNC: 86 U/L (ref 45–120)
ALT SERPL W P-5'-P-CCNC: 17 U/L (ref 0–45)
ANION GAP SERPL CALCULATED.3IONS-SCNC: 11 MMOL/L (ref 5–18)
APPEARANCE UR: CLEAR
AST SERPL W P-5'-P-CCNC: 18 U/L (ref 0–40)
BACTERIA #/AREA URNS HPF: ABNORMAL /HPF
BASOPHILS # BLD AUTO: 0.1 10E3/UL (ref 0–0.2)
BASOPHILS NFR BLD AUTO: 1 %
BILIRUB SERPL-MCNC: 0.7 MG/DL (ref 0–1)
BILIRUB UR QL STRIP: NEGATIVE
BUN SERPL-MCNC: 15 MG/DL (ref 8–28)
CALCIUM SERPL-MCNC: 10.3 MG/DL (ref 8.5–10.5)
CHLORIDE BLD-SCNC: 106 MMOL/L (ref 98–107)
CK SERPL-CCNC: 151 U/L (ref 30–190)
CO2 SERPL-SCNC: 23 MMOL/L (ref 22–31)
COLOR UR AUTO: YELLOW
CORTIS SERPL-MCNC: 6.8 UG/DL
CREAT SERPL-MCNC: 0.77 MG/DL (ref 0.6–1.1)
EOSINOPHIL # BLD AUTO: 0.2 10E3/UL (ref 0–0.7)
EOSINOPHIL NFR BLD AUTO: 2 %
ERYTHROCYTE [DISTWIDTH] IN BLOOD BY AUTOMATED COUNT: 13.9 % (ref 10–15)
GFR SERPL CREATININE-BSD FRML MDRD: 79 ML/MIN/1.73M2
GLUCOSE BLD-MCNC: 93 MG/DL (ref 70–125)
GLUCOSE UR STRIP-MCNC: 100 MG/DL
HCT VFR BLD AUTO: 41.1 % (ref 35–47)
HGB BLD-MCNC: 13.5 G/DL (ref 11.7–15.7)
HGB UR QL STRIP: ABNORMAL
IMM GRANULOCYTES # BLD: 0 10E3/UL
IMM GRANULOCYTES NFR BLD: 0 %
KETONES UR STRIP-MCNC: NEGATIVE MG/DL
LEUKOCYTE ESTERASE UR QL STRIP: ABNORMAL
LYMPHOCYTES # BLD AUTO: 1.2 10E3/UL (ref 0.8–5.3)
LYMPHOCYTES NFR BLD AUTO: 14 %
MCH RBC QN AUTO: 28.7 PG (ref 26.5–33)
MCHC RBC AUTO-ENTMCNC: 32.8 G/DL (ref 31.5–36.5)
MCV RBC AUTO: 87 FL (ref 78–100)
MONOCYTES # BLD AUTO: 0.8 10E3/UL (ref 0–1.3)
MONOCYTES NFR BLD AUTO: 9 %
NEUTROPHILS # BLD AUTO: 6.3 10E3/UL (ref 1.6–8.3)
NEUTROPHILS NFR BLD AUTO: 74 %
NITRATE UR QL: NEGATIVE
PH UR STRIP: 6 [PH] (ref 5–8)
PLATELET # BLD AUTO: 206 10E3/UL (ref 150–450)
POTASSIUM BLD-SCNC: 4.3 MMOL/L (ref 3.5–5)
PROT SERPL-MCNC: 6.9 G/DL (ref 6–8)
RBC # BLD AUTO: 4.71 10E6/UL (ref 3.8–5.2)
RBC #/AREA URNS AUTO: ABNORMAL /HPF
SODIUM SERPL-SCNC: 140 MMOL/L (ref 136–145)
SP GR UR STRIP: 1.02 (ref 1–1.03)
SQUAMOUS #/AREA URNS AUTO: ABNORMAL /LPF
TSH SERPL DL<=0.005 MIU/L-ACNC: 1.42 UIU/ML (ref 0.3–5)
UROBILINOGEN UR STRIP-ACNC: 0.2 E.U./DL
WBC # BLD AUTO: 8.6 10E3/UL (ref 4–11)
WBC #/AREA URNS AUTO: ABNORMAL /HPF

## 2022-06-23 PROCEDURE — 87186 SC STD MICRODIL/AGAR DIL: CPT | Performed by: FAMILY MEDICINE

## 2022-06-23 PROCEDURE — 99214 OFFICE O/P EST MOD 30 MIN: CPT | Performed by: FAMILY MEDICINE

## 2022-06-23 PROCEDURE — 84443 ASSAY THYROID STIM HORMONE: CPT | Performed by: FAMILY MEDICINE

## 2022-06-23 PROCEDURE — 36415 COLL VENOUS BLD VENIPUNCTURE: CPT | Performed by: FAMILY MEDICINE

## 2022-06-23 PROCEDURE — 86038 ANTINUCLEAR ANTIBODIES: CPT | Performed by: FAMILY MEDICINE

## 2022-06-23 PROCEDURE — 82533 TOTAL CORTISOL: CPT | Performed by: FAMILY MEDICINE

## 2022-06-23 PROCEDURE — 87086 URINE CULTURE/COLONY COUNT: CPT | Performed by: FAMILY MEDICINE

## 2022-06-23 PROCEDURE — 81001 URINALYSIS AUTO W/SCOPE: CPT | Performed by: FAMILY MEDICINE

## 2022-06-23 PROCEDURE — 85025 COMPLETE CBC W/AUTO DIFF WBC: CPT | Performed by: FAMILY MEDICINE

## 2022-06-23 PROCEDURE — 82550 ASSAY OF CK (CPK): CPT | Performed by: FAMILY MEDICINE

## 2022-06-23 PROCEDURE — 80053 COMPREHEN METABOLIC PANEL: CPT | Performed by: FAMILY MEDICINE

## 2022-06-23 RX ORDER — BUPROPION HYDROCHLORIDE 150 MG/1
150 TABLET ORAL EVERY MORNING
Qty: 90 TABLET | Refills: 1
Start: 2022-06-23 | End: 2022-08-22 | Stop reason: SINTOL

## 2022-06-23 RX ORDER — FLUOXETINE 10 MG/1
10 CAPSULE ORAL DAILY
Qty: 90 CAPSULE | Refills: 1
Start: 2022-06-23 | End: 2022-07-14

## 2022-06-23 ASSESSMENT — ANXIETY QUESTIONNAIRES
7. FEELING AFRAID AS IF SOMETHING AWFUL MIGHT HAPPEN: SEVERAL DAYS
GAD7 TOTAL SCORE: 5
2. NOT BEING ABLE TO STOP OR CONTROL WORRYING: SEVERAL DAYS
GAD7 TOTAL SCORE: 5
GAD7 TOTAL SCORE: 5
6. BECOMING EASILY ANNOYED OR IRRITABLE: SEVERAL DAYS
8. IF YOU CHECKED OFF ANY PROBLEMS, HOW DIFFICULT HAVE THESE MADE IT FOR YOU TO DO YOUR WORK, TAKE CARE OF THINGS AT HOME, OR GET ALONG WITH OTHER PEOPLE?: SOMEWHAT DIFFICULT
4. TROUBLE RELAXING: NOT AT ALL
3. WORRYING TOO MUCH ABOUT DIFFERENT THINGS: SEVERAL DAYS
7. FEELING AFRAID AS IF SOMETHING AWFUL MIGHT HAPPEN: SEVERAL DAYS
5. BEING SO RESTLESS THAT IT IS HARD TO SIT STILL: NOT AT ALL
1. FEELING NERVOUS, ANXIOUS, OR ON EDGE: SEVERAL DAYS

## 2022-06-23 ASSESSMENT — PATIENT HEALTH QUESTIONNAIRE - PHQ9
SUM OF ALL RESPONSES TO PHQ QUESTIONS 1-9: 16
SUM OF ALL RESPONSES TO PHQ QUESTIONS 1-9: 16
10. IF YOU CHECKED OFF ANY PROBLEMS, HOW DIFFICULT HAVE THESE PROBLEMS MADE IT FOR YOU TO DO YOUR WORK, TAKE CARE OF THINGS AT HOME, OR GET ALONG WITH OTHER PEOPLE: SOMEWHAT DIFFICULT

## 2022-06-23 NOTE — PROGRESS NOTES
Assessment & Plan     Mild major depression (H)  Other fatigue  Hypersomnia  Vertigo  Reviewed recent urgency room visit for vertigo - CT head did not show acute pathology.  Discussed multiple symptoms including worsening depression, fatigue, sleeping a lot.  She started taking her previously prescribed antidepressant Wellbutrin 150 mg daily while taking Prozac either 10 to 20 mg a day.  Reviewed her record showing that in 2007 she was diagnosed with borderline sleep apnea however never started the CPAP.  Denies fever chills shortness of breath cough abdominal pain.  Does have ongoing urinary incontinence and foul-smelling urine.  Discussed with patient that we need to repeat her sleep study and will also do general lab today.  Discussed with patient that her fatigue and sleepiness during the day could very well be the result of untreated ENIO although she is very hesitant to get another sleep study or wear the CPAP machine.  She has a sister who does wear a CPAP every day for many years so she will ask her sister for her sister's opinion on CPAP.  I discussed with her today that if her sleep study is unremarkable and lab is unremarkable I would then send her to rheumatology for further evaluation of potential chronic fatigue syndrome which she read on the Internet as an explanation for all of her symptoms.  - TSH with free T4 reflex  - CBC with platelets and differential  - Comprehensive metabolic panel (BMP + Alb, Alk Phos, ALT, AST, Total. Bili, TP)  - CK total  - Adult [facility] Referral  - Cortisol  - buPROPion (WELLBUTRIN XL) 150 MG 24 hr tablet  Dispense: 90 tablet; Refill: 1  - Anti Nuclear Jael IgG by IFA with Reflex  - TSH with free T4 reflex  - CBC with platelets and differential  - Comprehensive metabolic panel (BMP + Alb, Alk Phos, ALT, AST, Total. Bili, TP)  - CK total  - Cortisol  - Anti Nuclear Jael IgG by IFA with Reflex    Foul smelling urine  Has been sent to urology but she  "hasn't gone. Wears depends.   - UA Macro with Reflex to Micro and Culture - lab collect  - UA Macro with Reflex to Micro and Culture - lab collect      Return in about 6 months (around 12/23/2022) for Routine preventive, with me, in person.    Paula Weaver MD  [facility] VINCENTS KENNY Rivera is a 76 year old presenting for the following health issues:  Follow Up (Has been really tired and fatigued, sleeping a lot, discuss chronic fatigue syndrome )      History of Present Illness       Mental Health Follow-up:  Patient presents to follow-up on Depression & Anxiety.Patient's depression since last visit has been:  Better  The patient is not having other symptoms associated with depression.  Patient's anxiety since last visit has been:  Medium  The patient is not having other symptoms associated with anxiety.  Any significant life events: financial concerns, housing concerns and health concerns  Patient is not feeling anxious or having panic attacks.  Patient has no concerns about alcohol or drug use.    Hypertension: She presents for follow up of hypertension.  She does not check blood pressure  regularly outside of the clinic. Outside blood pressures have been over 140/90. She does not follow a low salt diet.     Headaches:   Since the patient's last clinic visit, headaches are: no change  The patient is getting headaches:  Half The time  She is able to do normal daily activities when she has a migraine.  The patient is taking the following rescue/relief medications:  Ibuprofen (Advil, Motrin) and Naproxyn (Aleve)   Patient states \"The relief is inconsistent\" from the rescue/relief medications.   The patient is taking the following medications to prevent migraines:  No medications to prevent migraines  In the past 4 weeks, the patient has gone to an Urgent Care or Emergency Room 0 times times due to headaches.     Today's PHQ-9         PHQ-9 Total Score: 16    PHQ-9 Q9 Thoughts " of better off dead/self-harm past 2 weeks :   Not at all    How difficult have these problems made it for you to do your work, take care of things at home, or get along with other people: Somewhat difficult  Today's RAMILA-7 Score: 5     Chief Complaint   Patient presents with     Follow Up     Has been really tired and fatigued, sleeping a lot, discuss chronic fatigue syndrome      She started bupropion 150 mg daily again in the last couple of days.    Review of Systems   Constitutional, HEENT, cardiovascular, pulmonary, gi and gu systems are negative, except as otherwise noted.      Objective    /78   Pulse 72   Resp 20   Wt 75.9 kg (167 lb 6.4 oz)   BMI 29.89 kg/m    Body mass index is 29.89 kg/m .  Physical Exam   GENERAL: healthy, alert and no distress  HEENT: Mmampallatti score is 3-4  NECK: no adenopathy, no asymmetry, masses, or scars and thyroid normal to palpation  RESP: lungs clear to auscultation - no rales, rhonchi or wheezes  CV: regular rate and rhythm, normal S1 S2, no S3 or S4, no murmur, click or rub, no peripheral edema and peripheral pulses strong  ABDOMEN: soft, nontender, no hepatosplenomegaly, no masses and bowel sounds normal  MS: no gross musculoskeletal defects noted, no edema  NEURO: Normal strength and tone, mentation intact and speech normal  PSYCH: mentation appears normal, affect normal/bright                    .  ..

## 2022-06-24 ENCOUNTER — TELEPHONE (OUTPATIENT)
Dept: FAMILY MEDICINE | Facility: CLINIC | Age: 77
End: 2022-06-24

## 2022-06-24 DIAGNOSIS — N39.0 E. COLI UTI: ICD-10-CM

## 2022-06-24 DIAGNOSIS — R82.90 FOUL SMELLING URINE: Primary | ICD-10-CM

## 2022-06-24 DIAGNOSIS — B96.20 E. COLI UTI: ICD-10-CM

## 2022-06-24 LAB
ANA SER QL IF: NEGATIVE
BACTERIA UR CULT: ABNORMAL

## 2022-06-24 RX ORDER — SULFAMETHOXAZOLE/TRIMETHOPRIM 800-160 MG
1 TABLET ORAL 2 TIMES DAILY
Qty: 14 TABLET | Refills: 0 | Status: SHIPPED | OUTPATIENT
Start: 2022-06-24 | End: 2022-07-01

## 2022-06-24 NOTE — TELEPHONE ENCOUNTER
----- Message from Paula Weaver MD sent at 6/24/2022  1:57 PM CDT -----  Please call and let pt know urine study shows infection with e. Coli. I have sent bactrim to pharmacy for her. I may need to change antibiotics depending on result of sensitivity testing, which is not back yet.       Puala Weaver MD [facility] Care Team Pool  Please let pt know: besides urinary tract infection her lab all normal, nothing to explain her fatigue, sleepiness. Please see sleep specialist and do the sleep study for further evaluation of her symptoms

## 2022-06-27 ENCOUNTER — TELEPHONE (OUTPATIENT)
Dept: FAMILY MEDICINE | Facility: CLINIC | Age: 77
End: 2022-06-27

## 2022-06-27 NOTE — TELEPHONE ENCOUNTER
Patient called regarding her sleep study referral from Dr. Weaver. She has already confirmed with her insurance & states that her insurance needs a prior auth.    Called [facility] referral at [phone]. Rep states that patient's insurance [employer] usually covers for the sleep study.    Called patient back to relay message from [facility] sleep study center and gave phone number to patient to call & schedule.    Maia Feliciano RN, BSN  [facility]

## 2022-07-03 DIAGNOSIS — I10 ESSENTIAL HYPERTENSION: ICD-10-CM

## 2022-07-04 RX ORDER — LISINOPRIL 30 MG/1
30 TABLET ORAL DAILY
Qty: 90 TABLET | Refills: 3 | Status: SHIPPED | OUTPATIENT
Start: 2022-07-04 | End: 2022-10-20

## 2022-07-04 NOTE — TELEPHONE ENCOUNTER
"Outpatient Medication Detail     Disp Refills Start End JESU   fluticasone propionate (FLONASE) 50 mcg/actuation nasal spray 48 g 3 6/2/2021  No   Sig: SHAKE LIQUID AND USE 1 SPRAY IN EACH NOSTRIL TWICE DAILY   Sent to pharmacy as: fluticasone propionate 50 mcg/actuation nasal spray,suspension (FLONASE)   E-Prescribing Status: Receipt confirmed by pharmacy (6/2/2021  6:50 PM CDT)     Last office visit provider:  6/23/22     Requested Prescriptions   Pending Prescriptions Disp Refills     lisinopril (ZESTRIL) 30 MG tablet [Pharmacy Med Name: LISINOPRIL 30 MG TABLET] 90 tablet 1     Sig: TAKE 1 TABLET BY MOUTH EVERY DAY       ACE Inhibitors (Including Combos) Protocol Passed - 7/4/2022 10:32 AM        Passed - Blood pressure under 140/90 in past 12 months     BP Readings from Last 3 Encounters:   06/23/22 128/78   05/27/22 134/86   04/28/22 (!) 159/80                 Passed - Recent (12 mo) or future (30 days) visit within the authorizing provider's specialty     Patient has had an office visit with the authorizing provider or a provider within the authorizing providers department within the previous 12 mos or has a future within next 30 days. See \"Patient Info\" tab in inbasket, or \"Choose Columns\" in Meds & Orders section of the refill encounter.              Passed - Medication is active on med list        Passed - Patient is age 18 or older        Passed - No active pregnancy on record        Passed - Normal serum creatinine on file in past 12 months     Recent Labs   Lab Test 06/23/22  1129   CR 0.77       Ok to refill medication if creatinine is low          Passed - Normal serum potassium on file in past 12 months     Recent Labs   Lab Test 06/23/22  1129   POTASSIUM 4.3             Passed - No positive pregnancy test within past 12 months             Luis Thomas RN 07/04/22 10:33 AM  "

## 2022-07-13 DIAGNOSIS — F32.0 MILD MAJOR DEPRESSION (H): ICD-10-CM

## 2022-07-14 NOTE — TELEPHONE ENCOUNTER
"Outpatient Medication Detail     Disp Refills Start End JESU   FLUoxetine (PROZAC) 10 MG capsule 90 capsule 1 6/23/2022  No   Sig - Route: Take 1 capsule (10 mg) by mouth daily - Oral   Class: No Print Out   Order: [id number]     CLASS:  No print.  Routing to provider per protocol.    Routing refill request to provider for review/approval because:  PHQ9 score - greater than 4.    Last Written Prescription Date:  ???  Last Fill Quantity: ???,  # refills: ???   Last office visit provider:  6/23/22     Requested Prescriptions   Pending Prescriptions Disp Refills     FLUoxetine (PROZAC) 10 MG capsule 90 capsule 1     Sig: Take 1 capsule (10 mg) by mouth daily       SSRIs Protocol Failed - 7/14/2022  9:30 AM        Failed - PHQ-9 score less than 5 in past 6 months     Please review last PHQ-9 score.           Passed - Medication is active on med list        Passed - Patient is age 18 or older        Passed - No active pregnancy on record        Passed - No positive pregnancy test in last 12 months        Passed - Recent (6 mo) or future (30 days) visit within the authorizing provider's specialty     Patient had office visit in the last 6 months or has a visit in the next 30 days with authorizing provider or within the authorizing provider's specialty.  See \"Patient Info\" tab in inbasket, or \"Choose Columns\" in Meds & Orders section of the refill encounter.                 Luis Thomas RN 07/14/22 9:31 AM  "

## 2022-07-15 RX ORDER — FLUOXETINE 10 MG/1
10 CAPSULE ORAL DAILY
Qty: 90 CAPSULE | Refills: 3 | Status: SHIPPED | OUTPATIENT
Start: 2022-07-15 | End: 2022-08-22

## 2022-08-22 ENCOUNTER — OFFICE VISIT (OUTPATIENT)
Dept: FAMILY MEDICINE | Facility: CLINIC | Age: 77
End: 2022-08-22
Payer: COMMERCIAL

## 2022-08-22 ENCOUNTER — TELEPHONE (OUTPATIENT)
Dept: FAMILY MEDICINE | Facility: CLINIC | Age: 77
End: 2022-08-22

## 2022-08-22 VITALS
HEART RATE: 66 BPM | DIASTOLIC BLOOD PRESSURE: 69 MMHG | WEIGHT: 167.8 LBS | RESPIRATION RATE: 16 BRPM | BODY MASS INDEX: 29.96 KG/M2 | SYSTOLIC BLOOD PRESSURE: 132 MMHG

## 2022-08-22 DIAGNOSIS — L82.1 SEBORRHEIC KERATOSES: Primary | ICD-10-CM

## 2022-08-22 DIAGNOSIS — Z12.83 SKIN CANCER SCREENING: ICD-10-CM

## 2022-08-22 DIAGNOSIS — F32.0 MILD MAJOR DEPRESSION (H): ICD-10-CM

## 2022-08-22 DIAGNOSIS — Z12.11 SCREEN FOR COLON CANCER: ICD-10-CM

## 2022-08-22 DIAGNOSIS — Z12.11 SCREEN FOR COLON CANCER: Primary | ICD-10-CM

## 2022-08-22 DIAGNOSIS — M79.641 PAIN OF RIGHT HAND: ICD-10-CM

## 2022-08-22 DIAGNOSIS — M79.642 PAIN OF LEFT HAND: ICD-10-CM

## 2022-08-22 PROCEDURE — 99213 OFFICE O/P EST LOW 20 MIN: CPT | Mod: 25 | Performed by: FAMILY MEDICINE

## 2022-08-22 PROCEDURE — 0054A COVID-19,PF,PFIZER (12+ YRS): CPT | Performed by: FAMILY MEDICINE

## 2022-08-22 PROCEDURE — 96127 BRIEF EMOTIONAL/BEHAV ASSMT: CPT | Performed by: FAMILY MEDICINE

## 2022-08-22 PROCEDURE — 91305 COVID-19,PF,PFIZER (12+ YRS): CPT | Performed by: FAMILY MEDICINE

## 2022-08-22 RX ORDER — FLUOXETINE 10 MG/1
20 CAPSULE ORAL DAILY
Qty: 90 CAPSULE | Refills: 1 | Status: SHIPPED | OUTPATIENT
Start: 2022-08-22 | End: 2023-02-16

## 2022-08-22 ASSESSMENT — PATIENT HEALTH QUESTIONNAIRE - PHQ9
SUM OF ALL RESPONSES TO PHQ QUESTIONS 1-9: 12
10. IF YOU CHECKED OFF ANY PROBLEMS, HOW DIFFICULT HAVE THESE PROBLEMS MADE IT FOR YOU TO DO YOUR WORK, TAKE CARE OF THINGS AT HOME, OR GET ALONG WITH OTHER PEOPLE: NOT DIFFICULT AT ALL
SUM OF ALL RESPONSES TO PHQ QUESTIONS 1-9: 12

## 2022-08-22 NOTE — PROGRESS NOTES
Assessment & Plan     Seborrheic keratoses  On back, several lesions.  Sent to dermatology for skin cancer screening.  - Adult Dermatology Referral    Mild major depression (H)  She is supposed to take 20 mg daily in total  - FLUoxetine (PROZAC) 10 MG capsule  Dispense: 90 capsule; Refill: 1    Skin cancer screening  - Adult Dermatology Referral    Screen for colon cancer  - MOE([employer])    Pain of left hand  Pain of right hand  Worsening. Doesn't want any treatment at this point in time but will let me know in future.         Return in about 6 months (around 2/22/2023) for Routine preventive, with me, in person.    Paula Weaver MD  [facility]    Alycia Rivera is a 76 year old, presenting for the following health issues:  Mole (Check mole in the middle of back)      History of Present Illness       Reason for visit:  Booster ,  mole    She eats 0-1 servings of fruits and vegetables daily.She consumes 1 sweetened beverage(s) daily.She exercises with enough effort to increase her heart rate 9 or less minutes per day.  She exercises with enough effort to increase her heart rate 3 or less days per week. She is missing 1 dose(s) of medications per week.    Today's PHQ-9         PHQ-9 Total Score: 12    PHQ-9 Q9 Thoughts of better off dead/self-harm past 2 weeks :   Not at all    How difficult have these problems made it for you to do your work, take care of things at home, or get along with other people: Not difficult at all             Review of Systems   Constitutional, HEENT, cardiovascular, pulmonary, gi and gu systems are negative, except as otherwise noted.      Objective    /69 (BP Location: Left arm, Patient Position: Sitting, Cuff Size: Adult Regular)   Pulse 66   Resp 16   Wt 76.1 kg (167 lb 12.8 oz)   BMI 29.96 kg/m    Body mass index is 29.96 kg/m .  Physical Exam   GENERAL: healthy, alert and no distress  SKIN: seb K on back   NEURO: Normal  strength and tone, mentation intact and speech normal  PSYCH: mentation appears normal, affect normal/bright                    .  ..

## 2022-08-22 NOTE — TELEPHONE ENCOUNTER
FYI - Status Update    Who is Calling: patient    Update: Patient states that Cologuard is covered by patient's insurance. Please place order.     Does caller want a call/response back: No

## 2022-09-20 ENCOUNTER — TRANSFERRED RECORDS (OUTPATIENT)
Dept: HEALTH INFORMATION MANAGEMENT | Facility: CLINIC | Age: 77
End: 2022-09-20

## 2022-10-13 PROBLEM — E78.00 PRIMARY HYPERCHOLESTEROLEMIA: Status: ACTIVE | Noted: 2022-10-13

## 2022-10-14 ENCOUNTER — OFFICE VISIT (OUTPATIENT)
Dept: SLEEP MEDICINE | Facility: CLINIC | Age: 77
End: 2022-10-14
Attending: FAMILY MEDICINE
Payer: COMMERCIAL

## 2022-10-14 VITALS
HEIGHT: 63 IN | OXYGEN SATURATION: 95 % | HEART RATE: 65 BPM | BODY MASS INDEX: 29.34 KG/M2 | SYSTOLIC BLOOD PRESSURE: 137 MMHG | DIASTOLIC BLOOD PRESSURE: 91 MMHG | RESPIRATION RATE: 16 BRPM | WEIGHT: 165.6 LBS

## 2022-10-14 DIAGNOSIS — I10 ESSENTIAL HYPERTENSION: ICD-10-CM

## 2022-10-14 DIAGNOSIS — R53.83 OTHER FATIGUE: ICD-10-CM

## 2022-10-14 DIAGNOSIS — R06.00 DYSPNEA AND RESPIRATORY ABNORMALITY: Primary | ICD-10-CM

## 2022-10-14 DIAGNOSIS — R06.89 DYSPNEA AND RESPIRATORY ABNORMALITY: Primary | ICD-10-CM

## 2022-10-14 DIAGNOSIS — G47.10 HYPERSOMNIA: ICD-10-CM

## 2022-10-14 DIAGNOSIS — Z72.820 LACK OF ADEQUATE SLEEP: ICD-10-CM

## 2022-10-14 PROCEDURE — 99204 OFFICE O/P NEW MOD 45 MIN: CPT | Performed by: PHYSICIAN ASSISTANT

## 2022-10-14 RX ORDER — ERGOCALCIFEROL (VITAMIN D2) 10 MCG
400 TABLET ORAL DAILY
COMMUNITY

## 2022-10-14 ASSESSMENT — SLEEP AND FATIGUE QUESTIONNAIRES
HOW LIKELY ARE YOU TO NOD OFF OR FALL ASLEEP IN A CAR, WHILE STOPPED FOR A FEW MINUTES IN TRAFFIC: WOULD NEVER DOZE
HOW LIKELY ARE YOU TO NOD OFF OR FALL ASLEEP WHEN YOU ARE A PASSENGER IN A CAR FOR AN HOUR WITHOUT A BREAK: MODERATE CHANCE OF DOZING
HOW LIKELY ARE YOU TO NOD OFF OR FALL ASLEEP WHILE SITTING AND TALKING TO SOMEONE: WOULD NEVER DOZE
HOW LIKELY ARE YOU TO NOD OFF OR FALL ASLEEP WHILE SITTING QUIETLY AFTER LUNCH WITHOUT ALCOHOL: SLIGHT CHANCE OF DOZING
HOW LIKELY ARE YOU TO NOD OFF OR FALL ASLEEP WHILE LYING DOWN TO REST IN THE AFTERNOON WHEN CIRCUMSTANCES PERMIT: HIGH CHANCE OF DOZING
HOW LIKELY ARE YOU TO NOD OFF OR FALL ASLEEP WHILE WATCHING TV: MODERATE CHANCE OF DOZING
HOW LIKELY ARE YOU TO NOD OFF OR FALL ASLEEP WHILE SITTING AND READING: SLIGHT CHANCE OF DOZING
HOW LIKELY ARE YOU TO NOD OFF OR FALL ASLEEP WHILE SITTING INACTIVE IN A PUBLIC PLACE: MODERATE CHANCE OF DOZING

## 2022-10-14 NOTE — PROGRESS NOTES
Outpatient Sleep Medicine Consultation:      Name: Nika Navarrete MRN# [id number]   Age: 76 year old YOB: 1945     Date of Consultation: October 14, 2022  Consultation is requested by: Paula Weaver MD  [address] [place] [address] Paula Weaver  Primary care provider: Paula Weaver       Reason for Sleep Consult:     Nika Navarrete is sent by Paula Weaver for a sleep consultation regarding excessive daytime sleepiness.    Patient s Reason for visit  Nika Navarrete main reason for visit:  Tired all the time  Patient states problem(s) started:    Nika Navarrete's goals for this visit:             Assessment and Plan:     Summary Sleep Diagnoses & Recommendations:  Patient has features and risk factors for possible obstructive sleep apnea including: loud snoring,, non-refreshing sleep, bruxism, daytime sleepiness (ESS 11) , difficulty maintaining sleep, crowded oropharynx and co-morbid HTN. The STOP-BANG score is 4/8.  he pathophysiology, diagnosis and treatment of ENIO was discussed. Recommend Polysomnogram (using 4% desaturation/Medicare/ AASM 1B scoring rules) for intermediate risk probability obstructive sleep apnea.    Recommend weight management.     Nicole to follow up with Primary Care provider regarding elevated blood pressure.      Summary Recommendations:  Orders Placed This Encounter   Procedures     Comprehensive Sleep Study     Summary Counseling:    Sleep Testing Reviewed  Obstructive Sleep Apnea Reviewed  Complications of Untreated Sleep Apnea Reviewed    Medical Decision-making:   Educational materials provided in instructions    Total time spent reviewing medical records, history and physical examination, review of previous testing and interpretation as well as documentation on this date:46 minutes    CC: Paula Weaver          History of Present Illness:     She reports that she was diagnosed with borderline sleep apnea over 20  years. She refused CPAP and insurance refused to pay for surgery.     SLEEP-WAKE SCHEDULE:     Work/School Days: Patient goes to school/work: No   Usually gets into bed at    Takes patient about cant stay awake to fall asleep  Has trouble falling asleep 0 nights per week  Wakes up in the middle of the night 1 times.  Wakes up due to Snorting self awake;Use the bathroom  She has trouble falling back asleep 0 times a week.   It usually takes 0 to get back to sleep  Patient is usually up at 8  Uses alarm: No    Weekends/Non-work Days/All Other Days:  Usually gets into bed at 730 to 1030pm   Takes patient about Not long to fall asleep  Patient is usually up at 7 to 8AM  Uses alarm: No    Sleep Need  Patient gets  plenty sleep on average   Patient thinks she needs about unsure sleep    Nika Navarrete prefers to sleep in this position(s): Back;Side;Stomach;Head Elevated;Recliner   Patient states they do the following activities in bed: Use phone, computer, or tablet    Naps  Patient takes a purposeful nap 7 times a week and naps are usually 1 hour in duration  She feels better after a nap: Yes  She dozes off unintentionally 3 days per week  Patient has had a driving accident or near-miss due to sleepiness/drowsiness: No      SLEEP DISRUPTIONS:    Breathing/Snoring  Patient snores:Yes  Other people complain about her snoring: Yes  Patient has been told she stops breathing in her sleep:No  She has issues with the following: Morning headaches;Heartburn or reflux at night;Getting up to urinate more than once    Movement:  Patient gets pain, discomfort, with an urge to move:  No  It happens when she is resting:  No  It happens more at night:  No  Patient has been told she kicks her legs at night:  No     Behaviors in Sleep:  Nika MARC Navarrete has experienced the following behaviors while sleeping: Teeth grinding  She has experienced sudden muscle weakness during the day: No      Is there anything else you would like your  sleep provider to know: Tired all the time      CAFFEINE AND OTHER SUBSTANCES:    Patient consumes caffeinated beverages per day:  1  Last caffeine use is usually: 10am  List of any prescribed or over the counter stimulants that patient takes: none  List of any prescribed or over the counter sleep medication patient takes: none  List of previous sleep medications that patient has tried: melatonin in the past  Patient drinks alcohol to help them sleep: No  Patient drinks alcohol near bedtime: No    Family History:  Patient has a family member been diagnosed with a sleep disorder: Yes  RLS         SCALES:    EPWORTH SLEEPINESS SCALE      [place] Sleepiness Scale ( YANIRA Huber  1990-1997[facility] [place]/English - Final version - 21 Nov 07 - [facility] Research [place].) 10/14/2022   Sitting and reading Slight chance of dozing   Watching TV Moderate chance of dozing   Sitting, inactive in a public place (e.g. a theatre or a meeting) Moderate chance of dozing   As a passenger in a car for an hour without a break Moderate chance of dozing   Lying down to rest in the afternoon when circumstances permit High chance of dozing   Sitting and talking to someone Would never doze   Sitting quietly after a lunch without alcohol Slight chance of dozing   In a car, while stopped for a few minutes in traffic Would never doze   [place] Score (MC) 11   [place] Score (Sleep) 11         INSOMNIA SEVERITY INDEX (ELIZABETH)      Insomnia Severity Index (ELIZABETH) 10/14/2022   Difficulty falling asleep 0   Difficulty staying asleep 0   Problems waking up too early 0   How SATISFIED/DISSATISFIED are you with your CURRENT sleep pattern? 3   How NOTICEABLE to others do you think your sleep problem is in terms of impairing the quality of your life? 0   How WORRIED/DISTRESSED are you about your current sleep problem? 2   To what extent do you consider your sleep problem to INTERFERE with your daily functioning (e.g. daytime fatigue, mood, ability to function at  "work/daily chores, concentration, memory, mood, etc.) CURRENTLY? 2   ELIZABETH Total Score 7       Guidelines for Scoring/Interpretation:  Total score categories:  0-7 = No clinically significant insomnia   8-14 = Subthreshold insomnia   15-21 = Clinical insomnia (moderate severity)  22-28 = Clinical insomnia (severe)  Used via courtesy of www.mobile [facility].[place].gov with permission from Aaron Dennis PhD., Joint venture between [facility] and [facility]      STOP BANG     STOP BANG Questionnaire (  2008, the American Society of Anesthesiologists, Inc. [employer] 10/14/2022   1. Snoring - Do you snore loudly (louder than talking or loud enough to be heard through closed doors)? Yes   2. Tired - Do you often feel tired, fatigued, or sleepy during daytime? Yes   3. Observed - Has anyone observed you stop breathing during your sleep? No   4. Blood pressure - Do you have or are you being treated for high blood pressure? Yes   5. BMI - BMI more than 35 kg/m2? No   6. Age - Age over 50 yr old? Yes   7. Neck circumference - Neck circumference greater than 40 cm? No   8. Gender - Gender male? No   STOP BANG Score (MC): 5 (High risk of ENIO)   Neck Cir (cm) Clinic: -   B/P Clinic: 137/91   BMI Clinic: -         GAD7    RAMILA-7  6/23/2022   1. Feeling nervous, anxious, or on edge 1   2. Not being able to stop or control worrying 1   3. Worrying too much about different things 1   4. Trouble relaxing 0   5. Being so restless that it is hard to sit still 0   6. Becoming easily annoyed or irritable 1   7. Feeling afraid, as if something awful might happen 1   RAMILA-7 Total Score 5   If you checked any problems, how difficult have they made it for you to do your work, take care of things at home, or get along with other people? -         CAGE-AID    No flowsheet data found.    CAGE-AID reprinted with permission from the [place] Medical Journal, JUSTUS Fink. and GABRIEL Kellgog, \"Conjoint screening questionnaires for alcohol and drug abuse\" [place] " Medical Journal 94: 135-140, 1995.      PATIENT HEALTH QUESTIONNAIRE-9 (PHQ - 9)    PHQ-9 ([employer]) 8/22/2022   No Interest In Doing Things -   Feeling Depressed -   Trouble Sleeping -   Tired / No Energy -   No appetite or Over-Eating -   Feeling Bad about Self -   Trouble Concentrating -   Moving Slow or Restless -   Suicidal Thoughts -   Total Score -   1.  Little interest or pleasure in doing things 2   2.  Feeling down, depressed, or hopeless 1   3.  Trouble falling or staying asleep, or sleeping too much 3   4.  Feeling tired or having little energy 2   5.  Poor appetite or overeating 2   6.  Feeling bad about yourself 1   7.  Trouble concentrating 0   8.  Moving slowly or restless 1   9.  Suicidal or self-harm thoughts 0   PHQ-9 Total Score 12   Difficulty at work, home, or with people -   1.  Little interest or pleasure in doing things More than half the days   2.  Feeling down, depressed, or hopeless Several days   3.  Trouble falling or staying asleep, or sleeping too much Nearly every day   4.  Feeling tired or having little energy More than half the days   5.  Poor appetite or overeating More than half the days   6.  Feeling bad about yourself Several days   7.  Trouble concentrating Not at all   8.  Moving slowly or restless Several days   9.  Suicidal or self-harm thoughts Not at all   PHQ-9 via [facility] TOTAL SCORE-----> 12 (Moderate depression)   Difficulty at work, home, or with people Not difficult at all       Developed by Merry Muñoz, Orin Albarran, Alban Barillas and colleagues, with an educational kali from [employer] No permission required to reproduce, translate, display or distribute.        Allergies:    Allergies   Allergen Reactions     Codeine Nausea       Medications:    Current Outpatient Medications   Medication Sig Dispense Refill     atorvastatin (LIPITOR) 40 MG tablet Take 1 tablet (40 mg) by mouth At Bedtime 90 tablet 1     ergocalciferol (VITAMIN D2) 400 units (10  mcg) TABS tablet Take 400 Units by mouth daily Take 2 capsules once daily       FLUoxetine (PROZAC) 10 MG capsule Take 2 capsules (20 mg) by mouth daily 90 capsule 1     lisinopril (ZESTRIL) 30 MG tablet Take 1 tablet (30 mg) by mouth daily 90 tablet 3     UNABLE TO FIND MEDICATION NAME: Green tea capsule for weight         Problem List:  Patient Active Problem List    Diagnosis Date Noted     Bilateral hearing loss 10/14/2022     Priority: Medium     Primary hypercholesterolemia 10/13/2022     Priority: Medium     Formatting of this note might be different from the original.  Created by Conversion       Overweight 10/13/2022     Priority: Medium     Formatting of this note might be different from the original.  Created by Conversion       Lump or mass in breast 10/13/2022     Priority: Medium     Formatting of this note might be different from the original.  Created by Conversion       Osteopenia after menopause 09/18/2020     Priority: Medium     Advanced directives, counseling/discussion 07/10/2012     Priority: Medium     Discussed Advance Directive planning with patient; information given to patient to review.       Mild major depression (H)      Priority: Medium     History of colonic polyps 02/16/2012     Priority: Medium     2001 colonoscopy showed tubular adenoma at 30 cm. Repeat done at 2003.        Hyperlipidemia LDL goal <130 10/31/2010     Priority: Medium     Hypertension 10/20/2009     Priority: Medium     Diverticulosis of large intestine      Priority: Medium     Problem list name updated by automated process. Provider to review       Generalized osteoarthrosis, unspecified site 01/16/2007     Priority: Medium        Past Medical/Surgical History:  Past Medical History:   Diagnosis Date     ASCUS on Pap smear 2009    +HR HPV 66,[place]/ECC-negative     ASCUS on Pap smear 2/16/2009    Overview:  1/30/09:Pap--ASCUS, + HPV 66 (high risk). Recommend colposcopy. 3/4/09:Colpo--cervix hard to visualized;  stenotic. ECC taken--negative--repeat Pap 6 months 1/5/2010:Pap--NIL. Repap 6 months. 8/5/10:pap--NIL. Repeat pap 1 year. 1/10/12:Pap--NIL. Rpt in 1 yr. Reminder sent to system     Benign neoplasm of colon 5/2001    tubular adenomatous polyp     Diverticulosis of colon (without mention of hemorrhage)      Osteoarthrosis, unspecified whether generalized or localized, unspecified site     hands, knees , hip, ankle, spine     Other and unspecified hyperlipidemia      Unspecified hearing loss     Uses hearing aide in both ears     Unspecified sleep apnea     mil;d     Past Surgical History:   Procedure Laterality Date     FACIAL COSMETIC SURGERY       MAMMOPLASTY AUGMENTATION  2005    same time as bilateral reduction states pt.     MAMMOPLASTY REDUCTION Bilateral 2005    bilateral reduction     ORTHOPEDIC SURGERY  2009    left ankle replacement     OTHER SURGICAL HISTORY      tummy tuck     SURGICAL HISTORY OF -   1994    D&C, cone biopsy      SURGICAL HISTORY OF -   3/25/03    colonoscopy - mid sigmoid diverticulosis, moderate; benign sigmoid polyps, Repeat 3 years.      SURGICAL HISTORY OF -   5/21/01    colonoscopy     SURGICAL HISTORY OF -   2005    breast augmentation     SURGICAL HISTORY OF -   2005    face lift, tummy tuck has lost 65 pounds     SURGICAL HISTORY OF -   9/11/2001    Oopherectomy unilat, has one left,      SURGICAL HISTORY OF -   child    Tonsils     SURGICAL HISTORY OF -   2007    L knee replacment     ZZC ENLARGE BREAST      Description: Breast Surgery Enlargement Procedure;  Recorded: 09/02/2014;       Social History:  Social History     Socioeconomic History     Marital status:      Spouse name: Not on file     Number of children: 3     Years of education: Not on file     Highest education level: Not on file   Occupational History     Occupation: Office work -- [employer]     Employer: [employer]   Tobacco Use     Smoking status: Former     Packs/day: 2.00     Years:  40.00     Pack years: 80.00     Types: Cigarettes     Quit date: 6/3/2007     Years since quitting: 15.3     Passive exposure: Never     Smokeless tobacco: Never     Tobacco comments:     quit 2010   Vaping Use     Vaping Use: Never used   Substance and Sexual Activity     Alcohol use: No     Comment: occasional     Drug use: No     Sexual activity: Never     Partners: Male     Comment: Single; children- 3   Other Topics Concern     Parent/sibling w/ CABG, MI or angioplasty before 65F 55M? Not Asked   Social History Narrative    . Does have a significant other although doesn't live with him  Lives alone.  Has 3 children. Has 7 grandchildren.  Watches TV, plays Nuiku. 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      Social Determinants of Health     Financial Resource Strain: Not on file   Food Insecurity: Not on file   Transportation Needs: Not on file   Physical Activity: Not on file   Stress: Not on file   Social Connections: Not on file   Intimate Partner Violence: Not on file   Housing Stability: Not on file       Family History:  Family History   Problem Relation Age of Onset     Cancer Mother         ovarian cancer     Breast Cancer Mother 50.00     Diabetes Father      Cancer Father         pancreatic and liver cancer     Diabetes Paternal Grandmother      Asthma No family hx of      Breast Cancer Other      Cancer - colorectal Other      Cancer Other      Breast Cancer Maternal Aunt      Breast Cancer Paternal Aunt      Ovarian Cancer Daughter        Review of Systems:  A complete review of systems reviewed by me is negative with the exeption of what has been mentioned in the history of present illness.  In the last TWO WEEKS have you experienced any of the following symptoms?  Fevers: No  Night Sweats: No  Weight Gain: No  Pain at Night: No  Double Vision: No  Changes in Vision: No  Difficulty Breathing through Nose: No  Sore Throat in Morning: No  Dry Mouth in  "the Morning: No  Shortness of Breath Lying Flat: No  Shortness of Breath With Activity: Yes  Awakening with Shortness of Breath: No  Increased Cough: Yes  Heart Racing at Night: No  Swelling in Feet or Legs: Yes  Diarrhea at Night: Yes  Heartburn at Night: Yes  Urinating More than Once at Night: Yes  Losing Control of Urine at Night: Yes  Joint Pains at Night: Yes  Headaches in Morning: Yes  Weakness in Arms or Legs: No  Depressed Mood: Yes  Anxiety: Yes     Physical Examination:  Vitals: BP (!) 137/91   Pulse 65   Resp 16   Ht 1.594 m (5' 2.75\")   Wt 75.1 kg (165 lb 9.6 oz)   SpO2 95%   BMI 29.57 kg/m    BMI= Body mass index is 29.57 kg/m .    Neck Cir (cm): 38 cm      GENERAL APPEARANCE: alert and no distress  EYES: Eyes grossly normal to inspection, PERRL and conjunctivae and sclerae normal  HENT: oropharynx crowded and wears dentures  NECK: no asymmetry, masses, or scars  RESP: lungs clear to auscultation - no rales, rhonchi or wheezes  CV: regular rates and rhythm, normal S1 S2, no S3 or S4 and no murmur, click or rub  NEURO: Normal strength and tone, mentation intact and speech normal  PSYCH: affect normal/bright  Mallampati Class: II.  Tonsillar Stage:          Data: All pertinent previous laboratory data reviewed     Recent Labs   Lab Test 06/23/22  1129 04/14/22  1029    140   POTASSIUM 4.3 4.4   CHLORIDE 106 102   CO2 23 27   ANIONGAP 11 11   GLC 93 84   BUN 15 15   CR 0.77 0.73   CASSANDRA 10.3 11.1*       Recent Labs   Lab Test 06/23/22  1129   WBC 8.6   RBC 4.71   HGB 13.5   HCT 41.1   MCV 87   MCH 28.7   MCHC 32.8   RDW 13.9          Recent Labs   Lab Test 06/23/22  1129   PROTTOTAL 6.9   ALBUMIN 4.1   BILITOTAL 0.7   ALKPHOS 86   AST 18   ALT 17       TSH   Date Value   06/23/2022 1.42 uIU/mL   07/22/2021 2.32 uIU/mL   03/04/2009 2.52 mU/L   03/14/2007 2.79 mU/L     PFT: Most Recent [place] Pulmonary Function Testing    FVC-Pred   Date Value Ref Range Status   08/23/2021 2.52 L  "     FVC-Pre   Date Value Ref Range Status   08/23/2021 3.16 L      FVC-%Pred-Pre   Date Value Ref Range Status   08/23/2021 125 %      FEV1-Pre   Date Value Ref Range Status   08/23/2021 2.50 L      FEV1-%Pred-Pre   Date Value Ref Range Status   08/23/2021 128 %      FEV1FVC-Pred   Date Value Ref Range Status   08/23/2021 78 %      FEV1FVC-Pre   Date Value Ref Range Status   08/23/2021 79 %      No results found for: 20029  FEFMax-Pred   Date Value Ref Range Status   08/23/2021 4.93 L/sec      FEFMax-Pre   Date Value Ref Range Status   08/23/2021 6.88 L/sec      FEFMax-%Pred-Pre   Date Value Ref Range Status   08/23/2021 139 %      ExpTime-Pre   Date Value Ref Range Status   08/23/2021 8.59 sec      FIFMax-Pre   Date Value Ref Range Status   08/23/2021 2.72 L/sec      FEV1FEV6-Pred   Date Value Ref Range Status   08/23/2021 78 %      FEV1FEV6-Pre   Date Value Ref Range Status   08/23/2021 80 %        James Rasmussen PA-C 10/14/2022

## 2022-10-14 NOTE — NURSING NOTE
"Chief Complaint   Patient presents with     Sleep Problem     Daytime fatigue       Initial BP (!) 153/84   Pulse 65   Resp 16   Ht 1.594 m (5' 2.75\")   Wt 75.1 kg (165 lb 9.6 oz)   SpO2 95%   BMI 29.57 kg/m   Estimated body mass index is 29.57 kg/m  as calculated from the following:    Height as of this encounter: 1.594 m (5' 2.75\").    Weight as of this encounter: 75.1 kg (165 lb 9.6 oz).    Medication Reconciliation: complete  ESS: 11  Neck circumference: 38 centimeters.  DME: N/A  Nely Gorman CMA      "

## 2022-10-14 NOTE — PATIENT INSTRUCTIONS
MY CONTACT NUMBERS ARE: [phone]  DOCTOR : ALEXANDER Black  SLEEP CENTER :   CPAP EQUIPMENT :    IF I HAVE SLEEP APNEA.....  WHERE CAN I FIND MORE INFORMATION?    American Academy of [employer] Patient information on sleep disorders:  http://yoursleep.aasmnet.org    THINGS TO REMEMBER  In most situations, sleep apnea is a lifelong disease that must be managed with daily therapy. Untreated disease, when severe, may result in an increased risk for an array of problems from heart disease to mood changes, car accidents and shorter lifespan.    CPAP -  WHY AND HOW?  Continuous positive airway pressure, or CPAP, is the most effective treatment for obstructive sleep apnea. A decision to use CPAP is a major step forward in the pursuit of a healthier life. The successful use of CPAP will help you breathe easier, sleep better and live healthier. Using CPAP can be a positive experience if you keep these arechiga points in mind:  Commitment  CPAP is not a quick fix for your problem. It involves a long-term commitment to improve your sleep and your health.    Communication  Stay in close communication with both your sleep doctor and your CPAP supplier. Ask lots of questions and seek help when you need it.    Consistency  Use CPAP all night, every night and for every nap. You will receive the maximum health benefits from CPAP when you use it every time that you sleep. This will also make it easier for your body to adjust to the treatment.    Correction  The first machine and mask that you try may not be the best ones for you. Work with your sleep doctor and your CPAP supplier to make corrections to your equipment selection. Ask about trying a different type of machine or mask if you have ongoing problems. Make sure that your mask is a good fit and learn to use your equipment properly.    Challenge  Tell a family member or close friend to ask you each morning if you used your CPAP the previous night. Have someone to  "challenge you to give it your best effort.    Connection   Your adjustment to CPAP will be easier if you are able to connect with others who use the same treatment. Ask your sleep doctor if there is a support group in your area for people who have sleep apnea, or look for one on the Internet.    Comfort   Increase your level of comfort by using a saline spray, decongestant or heated humidifier if CPAP irritates your nose, mouth or throat. Use your unit's \"ramp\" setting to slowly get used to the air pressure level. There may be soft pads you can buy that will fit over your mask straps. Look on www.CPAP.com for accessories such as these straps, a pillow contoured for side-sleeping with CPAP, longer hoses, hose covers to reduce condensation, or stands to keep the hose out of your way.                                 Cleaning   Clean your mask, tubing and headgear on a regular basis. Put this time in your schedule so that you don't forget to do it. Check and replace the filters for your CPAP unit and humidifier.    Completion   Although you are never finished with CPAP therapy, you should reward yourself by celebrating the completion of your first month of treatment. Expect this first month to be your hardest period of adjustment. It will involve some trial and error as you find the machine, mask and pressure settings that are right for you.    Continuation  After your first month of treatment, continue to make a daily commitment to use your CPAP all night, every night and for every nap.    CPAP-Tips to starting with success:  Begin using your CPAP for short periods of time during the day while you watch TV or read.    Use CPAP every night and for every nap. Using it less often reduces the health benefits and makes it harder for your body to get used to it.    Newer CPAP models are virtually silent; however, if you find the sound of your CPAP machine to be bothersome, place the unit under your bed to dampen the sound. "     Make small adjustments to your mask, tubing, straps and headgear until you get the right fit. Tightening the mask may actually worsen the leak.  If it leaves significant marks on your face or irritates the bridge of your nose, it may not be the best mask for you.  Speak with the person who supplied the mask and consider trying other masks.    Use a saline nasal spray to ease mild nasal congestion. Neti-Pot or saline nasal rinses may also help. Nasal gel sprays can help reduce nasal dryness.  Biotene mouthwash can be helpful to protect your teeth if you experience frequent dry mouth.  Dry mouth may be a sign of air escaping out of your mouth or out of the mask in the case of a full face mask.  Speak with your provider if you expect that is the case.     Take a nasal decongestant to relieve more severe nasal or sinus congestion.  Do not use Afrin (oxymetazoline) nasal spray more than 3 days in a row.  Speak with your sleep doctor if your nasal congestion is chronic.    Use a heated humidifier that fits your CPAP model to enhance your breathing comfort. Adjust the heat setting up if you get a dry nose or throat, down if you get condensation in the hose or mask.  Position the CPAP lower than you so that any condensation in the hose drains back into the machine rather than towards the mask.    Try a system that uses nasal pillows if traditional masks give you problems.    Clean your mask, tubing and headgear once a week. Make sure the equipment dries fully.    Regularly check and replace the filters for your CPAP unit and humidifier.    Work closely with your sleep doctor and your CPAP supplier to make sure that you have the machine, mask and air pressure setting that works best for you.    BESIDES CPAP, WHAT OTHER THERAPIES ARE THERE?  Postioning devices if you only have the problem on your back  Dental devices if your condition is mild  Nasal valves may be effective though experience is limited  Tongue Retaining  Device if missing teeth precludes the use of a dental device  Weight loss if you are overweight  Surgery in limited cases where devices are not acceptable or there are problems with structures in the nose and throat  If treated with one of these alternative options, further evaluation is necessary to ensure that the therapy is effective. This may require some form of testing.     Healthy Lifestyle:  Healthy diet, exercise and Limit alcohol: Not only will excessive alcohol increase your weight over time, but it irritates the throat tissues and make them swell, shrinking the airway and causing snoring. Drinking alcohol should be limited and stopped within 3-4 hours before going to bed.   Stop smoking: (Red swollen throat, heat, nicotine), also irritates and swells the airway, among numerous other negative health consequences.    Positioning Device  This example shows a pillow that straps around the waist. It may be appropriate for those whose sleep study shows milder sleep apnea that occurs primarily when lying flat on one's back. Preliminary studies have shown benefit but effectiveness at home should be verified.    Nasal Valves              Nasal valves may not be effective if you have frequent nasal congestion or have difficulty breathing through your nose. They may be an option for mild apnea if other options are not well tolerated. The efficacy of these devices is generally less than CPAP or oral appliances.  Oral Appliance  These are examples of two of many custom-made devices that are more likely to work in mild sleep apnea  Oral appliances are dental mouth pieces that fit very much like a sports mouth guards or removable orthodontic retainers. They are used to treat snoring  And obstructive sleep apnea . The device prevents the airway from collapsing by either holding the tongue or supporting the jaw in a forward position. Since oral appliances are non-invasive and easy to use, they may be considered as an  early treatment option. Oral appliance therapy (OAT) involves the customization, selection, fabrication, fitting, adjustments and long-term follow-up care of specially designed oral devices, worn during sleep, which reposition the lower jaw and tongue base forward to maintain an open airway.  Custom made oral appliances are proven to be more effective than over-the-counter devices. Therefore, the over-the-counter devices are recommended not to be used as a screening tool nor as a therapeutic option.  Who gets a dental device?  Oral appliance therapy can be used as an alternative to  CPAP therapy for the treatment of mild to moderate sleep apnea and for those patients who prefer OAT to CPAP. Oral appliance therapy is a first line therapy for the treatment of primary snoring. Additionally, OAT is an option for those that cannot tolerate CPAP as therapy or who have experienced insufficient surgical results.    Possible side effects?  Frequent but minor side effects include: excessive salivation, dry mouth, discomfort of teeth and jaw and temporary changes in the patient s bite.  Potential complications include: jaw pain, permanent occlusal changes and TMJ symptoms.  The above mentioned side effects and complications can be recognized and managed by dentists trained in dental sleep medicine.    Finding a dentist that practices dental sleep medicine  Specific training is available through the American Academy of Dental Sleep Medicine for dentists interested in working in the field of sleep. To find a dentist who is educated in the field of sleep and the use of oral appliances, near you, visit the Web site of the American Academy of Dental Sleep Medicine; also see http://www.accpstorage.org/newOrganization/patients/oralAppliances.pdf   To search for a dentist certified in these practices:  Http://aadsm.org/FindADentist.aspx?1  Http://www.accpstorage.org/newOrganization/patients/oralAppliances.pdf    Tongue Retaining  Device               Tongue Retaining Devices are devices that generally  suction cup  onto the tongue preventing it from falling back into the back of the throat during sleep.  They may be an option for people missing teeth, but can be uncomfortable. This particular device can be purchased online, but similar devices made by dentists fit more precisely and may be tolerated better. In general, they are rarely effective and often not very well tolerated.    Weight Loss:  Some patients may experience reduction or elimination of sleep apnea with weight loss.  Though there are significant health benefits from weight loss, long-term weight loss is very difficult to achieve- studies show success with dietary management in less that 10% of people.     If you are interested in dietary weight loss, you should review the options discussed at the National Institutes of Health patient information site:     Http:/www.health.nih.gov/topic/WeightLossDieting    Bariatric programs offer counseling in all methods of weight loss:    Http:/www.[facility].org/Specialties/WeightLossSurgeryandMedicalMgmt/htm    Surgery:  There are a number of surgeries that have been attempted to treat apnea. In general, surgical options are usually reserved for cases in which there is a physical abnormality contributing to obstruction or other treatment options are ineffective or not tolerated. Most surgical options are either unreliable or quite invasive. One of the more common procedures is:  Uvulopalatopharyngoplasty: In this procedure, the uvula (the finger-like tissue that hangs in the back of the throat), part of the soft palate (the tissue that the uvula is attached to), and sometimes the tonsils or adenoids are removed. The efficacy of this surgery is around 30-50% .  After surgery, complications may include:  Sleepiness and sleep apnea related to post-surgery medication   Swelling, infection and bleeding   A sore throat and/or difficulty  "swallowing   Drainage of secretions into the nose and a nasal quality to the voice. English language speech does not seem to be affected by this surgery.   Narrowing of the airway in the nose and throat (hence constricting breathing) snoring and even iatrogenically caused sleep apnea. By cutting the tissues, excess scar tissue can \"tighten\" the airway and make it even smaller than it was before UPPP.  Patients who have had the uvula removed will become unable to correctly speak [place]ian or any other language that has a uvular 'r' phoneme.    Surgeries to help resolve nasal congestion may help reduce the severity of apnea slightly. Nasal congestion does not cause apnea on its own, so these surgeries are usually not performed just for ENIO.  They may be worth considering if the nasal congestion is significantly bothersome independent of apnea.   "

## 2022-10-14 NOTE — NURSING NOTE
PSG and follow-up appointment with provider are both scheduled. PSG hand-out was given to patient at clinic appointment.   Nely Gorman CMA

## 2022-10-18 DIAGNOSIS — E78.01 ESSENTIAL FAMILIAL HYPERCHOLESTEROLEMIA: ICD-10-CM

## 2022-10-18 RX ORDER — ATORVASTATIN CALCIUM 40 MG/1
40 TABLET, FILM COATED ORAL AT BEDTIME
Qty: 90 TABLET | Refills: 1 | Status: SHIPPED | OUTPATIENT
Start: 2022-10-18 | End: 2023-04-26

## 2022-10-18 NOTE — TELEPHONE ENCOUNTER
"Last Written Prescription Date:  4/21/22  Last Fill Quantity: 90,  # refills: 1   Last office visit provider:  8/22/22     Requested Prescriptions   Pending Prescriptions Disp Refills     atorvastatin (LIPITOR) 40 MG tablet [Pharmacy Med Name: ATORVASTATIN 40 MG TABLET] 90 tablet 1     Sig: TAKE 1 TABLET BY MOUTH AT BEDTIME       Statins Protocol Passed - 10/18/2022 12:17 PM        Passed - LDL on file in past 12 months     Recent Labs   Lab Test 04/14/22  1029                Passed - No abnormal creatine kinase in past 12 months     Recent Labs   Lab Test 06/23/22  1129                   Passed - Recent (12 mo) or future (30 days) visit within the authorizing provider's specialty     Patient has had an office visit with the authorizing provider or a provider within the authorizing providers department within the previous 12 mos or has a future within next 30 days. See \"Patient Info\" tab in inbasket, or \"Choose Columns\" in Meds & Orders section of the refill encounter.              Passed - Medication is active on med list        Passed - Patient is age 18 or older        Passed - No active pregnancy on record        Passed - No positive pregnancy test in past 12 months             Luis Thomas RN 10/18/22 12:17 PM  "

## 2022-10-20 ENCOUNTER — OFFICE VISIT (OUTPATIENT)
Dept: FAMILY MEDICINE | Facility: CLINIC | Age: 77
End: 2022-10-20
Payer: COMMERCIAL

## 2022-10-20 VITALS
SYSTOLIC BLOOD PRESSURE: 146 MMHG | HEART RATE: 56 BPM | WEIGHT: 166 LBS | BODY MASS INDEX: 29.41 KG/M2 | DIASTOLIC BLOOD PRESSURE: 70 MMHG | HEIGHT: 63 IN

## 2022-10-20 DIAGNOSIS — R53.83 OTHER FATIGUE: ICD-10-CM

## 2022-10-20 DIAGNOSIS — Z00.00 ENCOUNTER FOR MEDICARE ANNUAL WELLNESS EXAM: Primary | ICD-10-CM

## 2022-10-20 DIAGNOSIS — M85.80 OSTEOPENIA AFTER MENOPAUSE: ICD-10-CM

## 2022-10-20 DIAGNOSIS — Z87.891 HISTORY OF TOBACCO ABUSE: ICD-10-CM

## 2022-10-20 DIAGNOSIS — M25.472 LEFT ANKLE SWELLING: ICD-10-CM

## 2022-10-20 DIAGNOSIS — R82.90 FOUL SMELLING URINE: ICD-10-CM

## 2022-10-20 DIAGNOSIS — I10 ESSENTIAL HYPERTENSION: ICD-10-CM

## 2022-10-20 DIAGNOSIS — F32.0 MILD MAJOR DEPRESSION (H): ICD-10-CM

## 2022-10-20 DIAGNOSIS — E78.01 ESSENTIAL FAMILIAL HYPERCHOLESTEROLEMIA: ICD-10-CM

## 2022-10-20 DIAGNOSIS — E55.9 VITAMIN D DEFICIENCY: ICD-10-CM

## 2022-10-20 DIAGNOSIS — Z78.0 OSTEOPENIA AFTER MENOPAUSE: ICD-10-CM

## 2022-10-20 DIAGNOSIS — Z78.0 POSTMENOPAUSAL STATUS: ICD-10-CM

## 2022-10-20 DIAGNOSIS — Z96.662 HISTORY OF LEFT ANKLE JOINT REPLACEMENT: ICD-10-CM

## 2022-10-20 LAB
ALBUMIN SERPL BCG-MCNC: 4.4 G/DL (ref 3.5–5.2)
ALBUMIN UR-MCNC: NEGATIVE MG/DL
ALP SERPL-CCNC: 87 U/L (ref 35–104)
ALT SERPL W P-5'-P-CCNC: 15 U/L (ref 10–35)
ANION GAP SERPL CALCULATED.3IONS-SCNC: 10 MMOL/L (ref 7–15)
APPEARANCE UR: ABNORMAL
AST SERPL W P-5'-P-CCNC: 20 U/L (ref 10–35)
BACTERIA #/AREA URNS HPF: ABNORMAL /HPF
BILIRUB SERPL-MCNC: 0.9 MG/DL
BILIRUB UR QL STRIP: NEGATIVE
BUN SERPL-MCNC: 12.4 MG/DL (ref 8–23)
CALCIUM SERPL-MCNC: 10.2 MG/DL (ref 8.8–10.2)
CHLORIDE SERPL-SCNC: 104 MMOL/L (ref 98–107)
CHOLEST SERPL-MCNC: 150 MG/DL
COLOR UR AUTO: YELLOW
CREAT SERPL-MCNC: 0.68 MG/DL (ref 0.51–0.95)
CRP SERPL-MCNC: 14.4 MG/L
DEPRECATED HCO3 PLAS-SCNC: 25 MMOL/L (ref 22–29)
ERYTHROCYTE [DISTWIDTH] IN BLOOD BY AUTOMATED COUNT: 14.2 % (ref 10–15)
GFR SERPL CREATININE-BSD FRML MDRD: 89 ML/MIN/1.73M2
GLUCOSE SERPL-MCNC: 101 MG/DL (ref 70–99)
GLUCOSE UR STRIP-MCNC: NEGATIVE MG/DL
HCT VFR BLD AUTO: 39.3 % (ref 35–47)
HDLC SERPL-MCNC: 46 MG/DL
HGB BLD-MCNC: 12.8 G/DL (ref 11.7–15.7)
HGB UR QL STRIP: NEGATIVE
KETONES UR STRIP-MCNC: NEGATIVE MG/DL
LDLC SERPL CALC-MCNC: 84 MG/DL
LEUKOCYTE ESTERASE UR QL STRIP: ABNORMAL
MCH RBC QN AUTO: 28.3 PG (ref 26.5–33)
MCHC RBC AUTO-ENTMCNC: 32.6 G/DL (ref 31.5–36.5)
MCV RBC AUTO: 87 FL (ref 78–100)
NITRATE UR QL: POSITIVE
NONHDLC SERPL-MCNC: 104 MG/DL
PH UR STRIP: 6.5 [PH] (ref 5–8)
PLATELET # BLD AUTO: 210 10E3/UL (ref 150–450)
POTASSIUM SERPL-SCNC: 4.1 MMOL/L (ref 3.4–5.3)
PROT SERPL-MCNC: 7 G/DL (ref 6.4–8.3)
RBC # BLD AUTO: 4.52 10E6/UL (ref 3.8–5.2)
RBC #/AREA URNS AUTO: ABNORMAL /HPF
SODIUM SERPL-SCNC: 139 MMOL/L (ref 136–145)
SP GR UR STRIP: 1.02 (ref 1–1.03)
SQUAMOUS #/AREA URNS AUTO: ABNORMAL /LPF
TRIGL SERPL-MCNC: 99 MG/DL
UROBILINOGEN UR STRIP-ACNC: 0.2 E.U./DL
WBC # BLD AUTO: 7.7 10E3/UL (ref 4–11)
WBC #/AREA URNS AUTO: ABNORMAL /HPF

## 2022-10-20 PROCEDURE — 87186 SC STD MICRODIL/AGAR DIL: CPT | Performed by: FAMILY MEDICINE

## 2022-10-20 PROCEDURE — 80053 COMPREHEN METABOLIC PANEL: CPT | Performed by: FAMILY MEDICINE

## 2022-10-20 PROCEDURE — 99214 OFFICE O/P EST MOD 30 MIN: CPT | Mod: 25 | Performed by: FAMILY MEDICINE

## 2022-10-20 PROCEDURE — 80061 LIPID PANEL: CPT | Performed by: FAMILY MEDICINE

## 2022-10-20 PROCEDURE — 87086 URINE CULTURE/COLONY COUNT: CPT | Performed by: FAMILY MEDICINE

## 2022-10-20 PROCEDURE — 82306 VITAMIN D 25 HYDROXY: CPT | Performed by: FAMILY MEDICINE

## 2022-10-20 PROCEDURE — 36415 COLL VENOUS BLD VENIPUNCTURE: CPT | Performed by: FAMILY MEDICINE

## 2022-10-20 PROCEDURE — 86140 C-REACTIVE PROTEIN: CPT | Performed by: FAMILY MEDICINE

## 2022-10-20 PROCEDURE — 81001 URINALYSIS AUTO W/SCOPE: CPT | Performed by: FAMILY MEDICINE

## 2022-10-20 PROCEDURE — G0439 PPPS, SUBSEQ VISIT: HCPCS | Performed by: FAMILY MEDICINE

## 2022-10-20 PROCEDURE — 85027 COMPLETE CBC AUTOMATED: CPT | Performed by: FAMILY MEDICINE

## 2022-10-20 PROCEDURE — 87088 URINE BACTERIA CULTURE: CPT | Performed by: FAMILY MEDICINE

## 2022-10-20 RX ORDER — LISINOPRIL 40 MG/1
40 TABLET ORAL DAILY
Qty: 90 TABLET | Refills: 3 | Status: SHIPPED | OUTPATIENT
Start: 2022-10-20 | End: 2023-10-20

## 2022-10-20 ASSESSMENT — ENCOUNTER SYMPTOMS
NERVOUS/ANXIOUS: 0
COUGH: 0
SHORTNESS OF BREATH: 0
HEMATURIA: 0
CHILLS: 0
EYE PAIN: 0
PALPITATIONS: 0
HEADACHES: 0
HEARTBURN: 0
JOINT SWELLING: 1
DYSURIA: 0
PARESTHESIAS: 0
WEAKNESS: 0
MYALGIAS: 0
ABDOMINAL PAIN: 0
HEMATOCHEZIA: 0
FEVER: 0
DIZZINESS: 0
DIARRHEA: 0
SORE THROAT: 0
NAUSEA: 0
CONSTIPATION: 0
ARTHRALGIAS: 1
BREAST MASS: 0
FREQUENCY: 0

## 2022-10-20 ASSESSMENT — ACTIVITIES OF DAILY LIVING (ADL): CURRENT_FUNCTION: NO ASSISTANCE NEEDED

## 2022-10-20 ASSESSMENT — PATIENT HEALTH QUESTIONNAIRE - PHQ9
SUM OF ALL RESPONSES TO PHQ QUESTIONS 1-9: 12
SUM OF ALL RESPONSES TO PHQ QUESTIONS 1-9: 12
10. IF YOU CHECKED OFF ANY PROBLEMS, HOW DIFFICULT HAVE THESE PROBLEMS MADE IT FOR YOU TO DO YOUR WORK, TAKE CARE OF THINGS AT HOME, OR GET ALONG WITH OTHER PEOPLE: NOT DIFFICULT AT ALL

## 2022-10-20 NOTE — PATIENT INSTRUCTIONS
Patient Education   Personalized Prevention Plan  You are due for the preventive services outlined below.  Your care team is available to assist you in scheduling these services.  If you have already completed any of these items, please share that information with your care team to update in your medical record.  Health Maintenance Due   Topic Date Due     Hepatitis B Vaccine (1 of 3 - 3-dose series) Never done     Zoster (Shingles) Vaccine (1 of 2) Never done     Pneumococcal Vaccine (1 - PCV) Never done     Colorectal Cancer Screening  01/29/2019     Diptheria Tetanus Pertussis (DTAP/TDAP/TD) Vaccine (3 - Td or Tdap) 06/02/2021     Flu Vaccine (1) Never done     COVID-19 Vaccine (5 - Booster for Pfizer series) 10/17/2022       Exercise for a Healthier Heart  You may wonder how you can improve the health of your heart. If you re thinking about exercise, you re on the right track. You don t need to become an athlete. But you do need a certain amount of brisk exercise to help strengthen your heart. If you have been diagnosed with a heart condition, your healthcare provider may advise exercise to help stabilize your condition. To help make exercise a habit, choose safe, fun activities.      Exercise with a friend. When activity is fun, you're more likely to stick with it.   Before you start  Check with your healthcare provider before starting an exercise program. This is especially important if you have not been active for a while. It's also important if you have a long-term (chronic) health problem such as heart disease, diabetes, or obesity. Or if you are at high risk for having these problems.   Why exercise?  Exercising regularly offers many healthy rewards. It can help you do all of the following:     Improve your blood cholesterol level to help prevent further heart trouble    Lower your blood pressure to help prevent a stroke or heart attack    Control diabetes, or reduce your risk of getting this  disease    Improve your heart and lung function    Reach and stay at a healthy weight    Make your muscles stronger so you can stay active    Prevent falls and fractures by slowing the loss of bone mass (osteoporosis)    Manage stress better    Reduce your blood pressure    Improve your sense of self and your body image  Exercise tips      Ease into your routine. Set small goals. Then build on them. If you are not sure what your activity level should be, talk with your healthcare provider first before starting an exercise routine.    Exercise on most days. Aim for a total of 150 minutes (2 hours and 30 minutes) or more of moderate-intensity aerobic activity each week. Or 75 minutes (1 hour and 15 minutes) or more of vigorous-intensity aerobic activity each week. Or try for a combination of both. Moderate activity means that you breathe heavier and your heart rate increases but you can still talk. Think about doing 40 minutes of moderate exercise, 3 to 4 times a week. For best results, activity should last for about 40 minutes to lower blood pressure and cholesterol. It's OK to work up to the 40-minute period over time. Examples of moderate-intensity activity are walking 1 mile in 15 minutes. Or doing 30 to 45 minutes of yard work.    Step up your daily activity level.  Along with your exercise program, try being more active the whole day. Walk instead of drive. Or park further away so that you take more steps each day. Do more household tasks or yard work. You may not be able to meet the advised mount of physical activity. But doing some moderate- or vigorous-intensity aerobic activity can help reduce your risk for heart disease. Your healthcare provider can help you figure out what is best for you.    Choose 1 or more activities you enjoy.  Walking is one of the easiest things you can do. You can also try swimming, riding a bike, dancing, or taking an exercise class.    When to call your healthcare provider  Call  your healthcare provider if you have any of these:     Chest pain or feel dizzy or lightheaded    Burning, tightness, pressure, or heaviness in your chest, neck, shoulders, back, or arms    Abnormal shortness of breath    More joint or muscle pain    A very fast or irregular heartbeat (palpitations)  Joao last reviewed this educational content on 7/1/2019 2000-2021 [employer]. All rights reserved. This information is not intended as a substitute for professional medical care. Always follow your healthcare professional's instructions.          Understanding USDA MyPlate  The USDA has guidelines to help you make healthy food choices. These are called MyPlate. MyPlate shows the food groups that make up healthy meals using the image of a place setting. Before you eat, think about the healthiest choices for what to put on your plate or in your cup or bowl. To learn more about building a healthy plate, visit www.choosemyplate.gov.    The food groups    Fruits. Any fruit or 100% fruit juice counts as part of the Fruit Group. Fruits may be fresh, canned, frozen, or dried, and may be whole, cut-up, or pureed. Make 1/2 of your plate fruits and vegetables.    Vegetables. Any vegetable or 100% vegetable juice counts as a member of the Vegetable Group. Vegetables may be fresh, frozen, canned, or dried. They can be served raw or cooked and may be whole, cut-up, or mashed. Make 1/2 of your plate fruits and vegetables.    Grains. All foods made from grains are part of the Grains Group. These include wheat, rice, oats, cornmeal, and barley. Grains are often used to make foods such as bread, pasta, oatmeal, cereal, tortillas, and grits. Grains should be no more than 1/4 of your plate. At least half of your grains should be whole grains.    Protein. This group includes meat, poultry, seafood, beans and peas, eggs, processed soy products (such as tofu), nuts (including nut butters), and seeds. Make protein choices  no more than 1/4 of your plate. Meat and poultry choices should be lean or low fat.    Dairy. The Dairy Group includes all fluid milk products and foods made from milk that contain calcium, such as yogurt and cheese. (Foods that have little calcium, such as cream, butter, and cream cheese, are not part of this group.) Most dairy choices should be low-fat or fat-free.    Oils. Oils aren't a food group, but they do contain essential nutrients. However it's important to watch your intake of oils. These are fats that are liquid at room temperature. They include canola, corn, olive, soybean, vegetable, and sunflower oil. Foods that are mainly oil include mayonnaise, certain salad dressings, and soft margarines. You likely already get your daily oil allowance from the foods you eat.  Things to limit  Eating healthy also means limiting these things in your diet:       Salt (sodium). Many processed foods have a lot of sodium. To keep sodium intake down, eat fresh vegetables, meats, poultry, and seafood when possible. Purchase low-sodium, reduced-sodium, or no-salt-added food products at the store. And don't add salt to your meals at home. Instead, season them with herbs and spices such as dill, oregano, cumin, and paprika. Or try adding flavor with lemon or lime zest and juice.    Saturated fat. Saturated fats are most often found in animal products such as beef, pork, and chicken. They are often solid at room temperature, such as butter. To reduce your saturated fat intake, choose leaner cuts of meat and poultry. And try healthier cooking methods such as grilling, broiling, roasting, or baking. For a simple lower-fat swap, use plain nonfat yogurt instead of mayonnaise when making potato salad or macaroni salad.    Added sugars. These are sugars added to foods. They are in foods such as ice cream, candy, soda, fruit drinks, sports drinks, energy drinks, cookies, pastries, jams, and syrups. Cut down on added sugars by  sharing sweet treats with a family member or friend. You can also choose fruit for dessert, and drink water or other unsweetened beverages.     [employer] last reviewed this educational content on 6/1/2020 2000-2021 [employer]. All rights reserved. This information is not intended as a substitute for professional medical care. Always follow your healthcare professional's instructions.          Signs of Hearing Loss      Hearing much better with one ear can be a sign of hearing loss.   Hearing loss is a problem shared by many people. In fact, it is one of the most common health problems, particularly as people age. Most people age 65 and older have some hearing loss. By age 80, almost everyone does. Hearing loss often occurs slowly over the years. So you may not realize your hearing has gotten worse.  Have your hearing checked  Call your healthcare provider if you:    Have to strain to hear normal conversation    Have to watch other people s faces very carefully to follow what they re saying    Need to ask people to repeat what they ve said    Often misunderstand what people are saying    Turn the volume of the television or radio up so high that others complain    Feel that people are mumbling when they re talking to you    Find that the effort to hear leaves you feeling tired and irritated    Notice, when using the phone, that you hear better with one ear than the other  [employer] last reviewed this educational content on 1/1/2020 2000-2021 [employer]. All rights reserved. This information is not intended as a substitute for professional medical care. Always follow your healthcare professional's instructions.          Urinary Incontinence, Female (Adult)   Urinary incontinence means loss of bladder control. This problem affects many women, especially as they get older. If you have incontinence, you may be embarrassed to ask for help. But know that this problem can be treated.    Types of Incontinence  There are different types of incontinence. Two of the main types are described here. You can have more than one type.     Stress incontinence. With this type, urine leaks when pressure (stress) is put on the bladder. This may happen when you cough, sneeze, or laugh. Stress incontinence most often occurs because the pelvic floor muscles that support the bladder and urethra are weak. This can happen after pregnancy and vaginal childbirth or a hysterectomy. It can also be due to excess body weight or hormone changes.    Urge incontinence (also called overactive bladder). With this type, a sudden urge to urinate is felt often. This may happen even though there may not be much urine in the bladder. The need to urinate often during the night is common. Urge incontinence most often occurs because of bladder spasms. This may be due to bladder irritation or infection. Damage to bladder nerves or pelvic muscles, constipation, and certain medicines can also lead to urge incontinence.  Treatment depends on the cause. Further evaluation is needed to find the type you have. This will likely include an exam and certain tests. Based on the results, you and your healthcare provider can then plan treatment. Until a diagnosis is made, the home care tips below can help ease symptoms.   Home care    Do pelvic floor muscle exercises, if they are prescribed. The pelvic floor muscles help support the bladder and urethra. Many women find that their symptoms improve when doing special exercises that strengthen these muscles. To do the exercises, contract the muscles you would use to stop your stream of urine. But do this when you re not urinating. Hold for 10 seconds, then relax. Repeat 10 to 20 times in a row, at least 3 times a day. Your healthcare provider may give you other instructions for how to do the exercises and how often.    Keep a bladder diary. This helps track how often and how much you urinate over a  set period of time. Bring this diary with you to your next visit with the provider. The information can help your provider learn more about your bladder problem.    Lose weight, if advised to by your provider. Extra weight puts pressure on the bladder. Your provider can help you create a weight-loss plan that s right for you. This may include exercising more and making certain diet changes.    Don't have foods and drinks that may irritate the bladder. These can include alcohol and caffeinated drinks.    Quit smoking. Smoking and other tobacco use can lead to a long-term (chronic) cough that strains the pelvic floor muscles. Smoking may also damage the bladder and urethra. Talk with your provider about treatments or methods you can use to quit smoking.    If drinking large amounts of fluid makes you have symptoms, you may be advised to limit your fluid intake. You may also be advised to drink most of your fluids during the day and to limit fluids at night.    If you re worried about urine leakage or accidents, you may wear absorbent pads to catch urine. Change the pads often. This helps reduce discomfort. It may also reduce the risk of skin or bladder infections.    Follow-up care  Follow up with your healthcare provider, or as directed. It may take some to find the right treatment for your problem. But healthy lifestyle changes can be made right away. These include such things as exercising on a regular basis, eating a healthy diet, losing weight (if needed), and quitting smoking. Your treatment plan may include special therapies or medicines. Certain procedures or surgery may also be options. Talk about any questions you have with your provider.   When to seek medical advice  Call the healthcare provider right away if any of these occur:    Fever of 100.4 F (38 C) or higher, or as directed by your provider    Bladder pain or fullness    Belly swelling    Nausea or vomiting    Back pain    Weakness, dizziness, or  vargas Shepherd last reviewed this educational content on 1/1/2020 2000-2021 [employer]. All rights reserved. This information is not intended as a substitute for professional medical care. Always follow your healthcare professional's instructions.          Treating Incontinence in Women: Nonsurgical Methods     The best treatment for you will depend on the type of incontinence you have. Your symptoms, age, and any underlying problems that are found also affect your treatment. Some types of incontinence may over time require surgery. But nonsurgical treatments may be effective in many cases. Nonsurgical treatments include lifestyle changes, muscle-strengthening exercises, and medicines.   Nonsurgical treatments  Treatment for stress urinary incontinence includes:     Bladder training    Lifestyle changes such as weight loss and increased activity if incontinence is due to being overweight    Medicines, if bladder training has not helped    Pelvic floor muscle exercises  Lifestyle changes    Losing weight. Excess weight puts extra pressure on the pelvic floor muscles. Exercising and eating right can help you lose weight. This helps other treatments work better.    Making certain diet changes. Some foods may make you need to urinate more, so it may be good not to have them. These include caffeinated drinks and alcohol. Ask your healthcare provider if these or other diet changes might be helpful.    Quitting smoking. Smoking can lead to a long-term (chronic) cough that strains pelvic floor muscles. Smoking may also damage the bladder and urethra.    Pelvic floor muscle exercises  There are exercises you can do to help strengthen your pelvic floor muscles. The pelvic floor muscles act as a sling to help hold the bladder and urethra in place. These muscles also help keep the urethra closed. Weak pelvic floor muscles may allow urine to leak. To strengthen the pelvic floor muscles, do the exercises  daily. In a few months, the muscles will be stronger and tighter. This can help prevent urine leakage.   [employer] last reviewed this educational content on 9/1/2019 2000-2021 [employer]. All rights reserved. This information is not intended as a substitute for professional medical care. Always follow your healthcare professional's instructions.          Treating Incontinence in Women: Special Therapies      During biofeedback, sensors send signals from your pelvic floor muscles to a computer screen.   Your healthcare provider will discuss your options for treating your urinary incontinence. These depend on the cause of your problem and any other health issues you have. Often behavioral changes are tried first, followed by various medicines. If these methods are unsuccessful, one or more of the therapies described below may be part of your treatment plan.   Biofeedback  This method is taught by a nurse or physical therapist. During the therapy, a small sensor is placed in your vagina or rectum. Another sensor is placed on your stomach. Other types of sensors are also available. These sensors read signals from the pelvic floor muscles. When you contract or relax your muscles, these signals are shown as images on a computer screen. Using the images, you can learn to relax or contract certain muscles. This can help you strengthen and better control these muscles. And it can help you learn pelvic floor muscle exercises.   Electrical stimulation  This is a painless therapy that uses a tiny amount of electric current. It helps strengthen very weak or damaged pelvic floor muscles. The electric current is sent through the muscles of the pelvic floor and bladder. This causes the muscles to contract. In time, this helps make the muscles stronger.   Stimulator implants  This method is used to treat urge incontinence. A small device is implanted under the skin near the upper buttocks. This device gives off mild  electrical signals. These block extra signals that are being sent to the bladder muscle. This helps the bladder work more normally.   [employer] last reviewed this educational content on 6/1/2019 2000-2021 [employer]. All rights reserved. This information is not intended as a substitute for professional medical care. Always follow your healthcare professional's instructions.          Treating Incontinence in Women with Medicine    Urinary incontinence is the leaking of urine from the bladder. In some cases, medicine can reduce or stop the leaking. It's mainly given for urge incontinence. Your healthcare provider will talk with you about your options. Make sure to ask what side effects to expect.   Below are some types of medicines that may help with urge incontinence.   Types of medicine    Anticholinergics or beta-3 adrenergics.  These may increase how much urine the bladder can hold. They may also help relax bladder muscles.    Estrogen. This may help improve muscle tone in the urethra and bladder.    Antibiotics. These are used to treat urinary tract infections.    Botulinum toxin. Injection of botulinum toxin into the bladder muscle is an option when other medicines are not effective.    Tips for taking medicine    Take your medicine on time and as your healthcare provider tell you to.    Tell your healthcare provider if you have any side effects, your dosage may be adjusted if needed.    Be patient. It may take time to find the right dose for you.    Keep a list of the medicines you take. Show it to your healthcare provider and pharmacist before you buy over-the-counter medicines.    [employer] last reviewed this educational content on 9/1/2019 2000-2021 [employer]. All rights reserved. This information is not intended as a substitute for professional medical care. Always follow your healthcare professional's instructions.        Your Health Risk Assessment indicates you feel you  "are not in good emotional health.    Recreation   Recreation is not limited to sports and team events. It includes any activity that provides relaxation, interest, enjoyment, and exercise. Recreation provides an outlet for physical, mental, and social energy. It can give a sense of worth and achievement. It can help you stay healthy.    Mental Exercise and Social Involvement  Mental and emotional health is as important as physical health. Keep in touch with friends and family. Stay as active as possible. Continue to learn and challenge yourself.   Things you can do to stay mentally active are:    Learn something new, like a foreign language or musical instrument.     Play SCRABBLE or do crossword puzzles. If you cannot find people to play these games with you at home, you can play them with others on your computer through the Internet.     Join a games club--anything from card games to chess or checkers or lawn bowling.     Start a new hobby.     Go back to school.     Volunteer.     Read.   Keep up with world events.    Depression and Suicide in Older Adults    Nearly 2 million older Americans have some type of depression. Some of them even take their own lives. Yet depression among older adults is often ignored. Learn the warning signs. You may help spare a loved one needless pain. You may also save a life.   What is depression?  Depression is a common and serious illness that affects the way you think and feel. It is not a normal part of aging, nor is it a sign of weakness, a character flaw, or something you can snap out of. Most people with depression need treatment to get better. The most common symptom is a feeling of deep sadness. People who are depressed also may seem tired and listless. And nothing seems to give them pleasure. It s normal to grieve or be sad sometimes. But sadness lessens or passes with time. Depression rarely goes away or improves on its own. A person with clinical depression can't \"snap " "out of it.\" Other symptoms of depression are:     Sleeping more or less than normal    Eating more or less than normal    Having headaches, stomachaches, or other pains that don t go away    Feeling nervous,  empty,  or worthless    Crying a great deal    Thinking or talking about suicide or death    Loss of interest in activities previously enjoyed    Social isolation    Feeling confused or forgetful  What causes it?  The causes of depression aren t fully known. But it is thought to result from a complex blend of these factors:     Biochemistry. Certain chemicals in the brain play a role.    Genes. Depression does run in families.    Life stress. Life stresses can also trigger depression in some people. Older adults often face many stressors, such as death of friends or a spouse, health problems, and financial concerns.    Chronic conditions. This includes conditions such as diabetes, heart disease, or cancer. These can cause symptoms of depression. Medicine side effects can cause changes in thoughts and behaviors.  How you can help  Often, depressed people may not want to ask for help. When they do, they may be ignored. Or, they may receive the wrong treatment. You can help by showing parents and older friends love and support. If they seem depressed, don t lecture the person, ignore the symptoms, or discount the symptoms as a  normal  part of aging -which they are not. Get involved, listen, and show interest and support.   Help them understand that depression is a treatable illness. Tell them you can help them find the right treatment. Offer to go to their healthcare provider's appointment with them for support when the symptoms are discussed. With their approval, contact a local mental health center, social service agency, or hospital about services.   You can be an advocate for him or her at healthcare appointments. Many older adults have chronic illnesses that can cause symptoms of depression. Medicine side " effects can change thoughts and behaviors. You can help make sure that the healthcare provider looks at all of these factors. He or she should refer your family member or friend to a mental healthcare provider when needed. in some cases, untreated depression can lead to a misdiagnosis. A person may be diagnosed with a brain disorder such as dementia. If the healthcare provider does not take the issue of depression seriously, help your family member or friend to find another provider.   Don't be afraid to ask  If you think an older person you care about could be suicidal, ask,  Have you thought about suicide?  Most people will tell you the truth. If they say  yes,  they may already have a plan for how and when they will attempt it. Find out as much as you can. The more detailed the plan, and the easier it is to carry out, the more danger the person is in right now. Tell the person you are there for them and do not want them to harm him or herself. Don't wait to get help for the person. Call the person's healthcare provider, local hospital, or emergency services.   To learn more    National Suicide Prevention Lifeline (crisis hotline) 039-646-REWA (474-072-1014)    National [place] of Mental Ecagmd513-877-5679uel.[facility].nih.gov    National [place] on Mental Gbdbzjw775-404-6211lwq.tc.org    Mental Health Obdbsly706-700-3966zaq.[facility].org    National Suicide Suezhxv283-TMODIQO (616-869-0700)    Call 911  Never leave the person alone. A person who is actively suicidal needs psychiatric care right away. They will need constant supervision. Never leave the person out of sight. Call 911 or the national 24-hour suicide crisis hotline at 995-985-EIVY (719-708-9616). You can also take the person to the closest emergency room.   [employer] last reviewed this educational content on 5/1/2020 2000-2021 [employer]. All rights reserved. This information is not intended as a substitute for professional medical care.  Always follow your healthcare professional's instructions.

## 2022-10-20 NOTE — PROGRESS NOTES
"    She is at risk for lack of exercise and has been provided with information to increase physical activity for the benefit of her well-being.  The patient was counseled and encouraged to consider modifying their diet and eating habits. She was provided with information on recommended healthy diet options.  The patient was provided with written information regarding signs of hearing loss.  Information on urinary incontinence and treatment options given to patient.  The patient was provided with suggestions to help her develop a healthy emotional lifestyle.  The patient s PHQ-9 score is consistent with moderate depression. Stable.   Answers for HPI/ROS submitted by the patient on 10/20/2022  If you checked off any problems, how difficult have these problems made it for you to do your work, take care of things at home, or get along with other people?: Not difficult at all  PHQ9 TOTAL SCORE: 12  In general, how would you rate your overall physical health?: good  Frequency of exercise:: None  Do you usually eat at least 4 servings of fruit and vegetables a day, include whole grains & fiber, and avoid regularly eating high fat or \"junk\" foods? : No  Taking medications regularly:: Yes  Medication side effects:: None  Activities of Daily Living: no assistance needed  Home safety: no safety concerns identified  Hearing Impairment:: difficulty following a conversation in a noisy restaurant or crowded room, feel that people are mumbling or not speaking clearly, difficulty following dialogue in the theater, difficult to understand a speaker at a public meeting or Buddhism service, need to ask people to speak up or repeat themselves  In the past 6 months, have you been bothered by leaking of urine?: Yes  abdominal pain: No  Blood in stool: No  Blood in urine: No  chest pain: No  chills: No  congestion: No  constipation: No  cough: No  diarrhea: No  dizziness: No  ear pain: No  eye pain: No  nervous/anxious: No  fever: " No  frequency: No  genital sores: No  headaches: No  hearing loss: No  heartburn: No  arthralgias: Yes  joint swelling: Yes  peripheral edema: Yes  mood changes: No  myalgias: No  nausea: No  dysuria: No  palpitations: No  Skin sensation changes: No  sore throat: No  urgency: No  rash: No  shortness of breath: No  visual disturbance: No  weakness: No  pelvic pain: No  vaginal bleeding: No  vaginal discharge: No  tenderness: No  breast mass: No  breast discharge: No  In general, how would you rate your overall mental or emotional health?: fair  Additional concerns today:: No

## 2022-10-20 NOTE — PROGRESS NOTES
"SUBJECTIVE:   Nicole is a 76 year old who presents for Preventive Visit.      Patient has been advised of split billing requirements and indicates understanding: Yes  Are you in the first 12 months of your Medicare coverage?  No    Healthy Habits:     In general, how would you rate your overall health?  Good    Frequency of exercise:  None    Do you usually eat at least 4 servings of fruit and vegetables a day, include whole grains    & fiber and avoid regularly eating high fat or \"junk\" foods?  No    Taking medications regularly:  Yes    Medication side effects:  None    Ability to successfully perform activities of daily living:  No assistance needed    Home Safety:  No safety concerns identified    Hearing Impairment:  Difficulty following a conversation in a noisy restaurant or crowded room, feel that people are mumbling or not speaking clearly, difficulty following dialogue in the theater, difficult to understand a speaker at a public meeting or Muslim service and need to ask people to speak up or repeat themselves    In the past 6 months, have you been bothered by leaking of urine? Yes    In general, how would you rate your overall mental or emotional health?  Fair      PHQ-2 Total Score: 3    Additional concerns today:  No    Do you feel safe in your environment? Yes    Have you ever done Advance Care Planning? (For example, a Health Directive, POLST, or a discussion with a medical provider or your loved ones about your wishes): No, advance care planning information given to patient to review.  Patient declined advance care planning discussion at this time.       Fall risk  Fallen 2 or more times in the past year?: No  Any fall with injury in the past year?: No    Cognitive Screening   1) Repeat 3 items (Leader, Season, Table)    2) Clock draw: NORMAL  3) 3 item recall: Recalls 1 object   Results: NORMAL clock, 1-2 items recalled: COGNITIVE IMPAIRMENT LESS LIKELY    Mini-CogTM Copyright S Martin. Licensed " by the author for use in [facility]; reprinted with permission (ovidio@[facility]). All rights reserved.      Do you have sleep apnea, excessive snoring or daytime drowsiness?: yes    Reviewed and updated as needed this visit by clinical staff   Tobacco  Allergies  Meds              Reviewed and updated as needed this visit by Provider                 Social History     Tobacco Use     Smoking status: Former     Packs/day: 2.00     Years: 40.00     Pack years: 80.00     Types: Cigarettes     Quit date: 6/3/2007     Years since quitting: 15.3     Passive exposure: Never     Smokeless tobacco: Never     Tobacco comments:     quit 2010   Substance Use Topics     Alcohol use: No     Comment: occasional         Alcohol Use 10/20/2022   Prescreen: >3 drinks/day or >7 drinks/week? Not Applicable   Prescreen: >3 drinks/day or >7 drinks/week? -       Chief Complaint   Patient presents with     Wellness Visit     Has sleep test scheduled in 1/2023  Left ankle more swollen.     Current providers sharing in care for this patient include:   Patient Care Team:  Paula Weaver MD as PCP - General (Family Medicine)  Paula Weaver MD as Assigned PCP  Eduarda Gao PA-C as Physician Assistant (Urology)    The following health maintenance items are reviewed in Epic and correct as of today:  Health Maintenance   Topic Date Due     HEPATITIS B IMMUNIZATION (1 of 3 - 3-dose series) Never done     ZOSTER IMMUNIZATION (1 of 2) Never done     Pneumococcal Vaccine: 65+ Years (1 - PCV) Never done     COLORECTAL CANCER SCREENING  01/29/2019     DTAP/TDAP/TD IMMUNIZATION (3 - Td or Tdap) 06/02/2021     INFLUENZA VACCINE (1) Never done     COVID-19 Vaccine (5 - Booster for Pfizer series) 10/17/2022     MEDICARE ANNUAL WELLNESS VISIT  09/30/2022     PHQ-9  04/20/2023     ANNUAL REVIEW OF HM ORDERS  06/23/2023     FALL RISK ASSESSMENT  10/20/2023     ADVANCE CARE PLANNING  09/30/2026     LIPID  04/14/2027     SUYAPA  " 01/23/2034     HEPATITIS C SCREENING  Completed     DEPRESSION ACTION PLAN  Completed     IPV IMMUNIZATION  Aged Out     MENINGITIS IMMUNIZATION  Aged Out     MAMMO SCREENING  Discontinued     LUNG CANCER SCREENING  Discontinued     Lab work is in process      Mammogram Screening - Patient over age 75, has elected to discontinue screenings.  Pertinent mammograms are reviewed under the imaging tab.    Review of Systems   Constitutional: Negative for chills and fever.   HENT: Negative for congestion, ear pain, hearing loss and sore throat.    Eyes: Negative for pain and visual disturbance.   Respiratory: Negative for cough and shortness of breath.    Cardiovascular: Positive for peripheral edema. Negative for chest pain and palpitations.   Gastrointestinal: Negative for abdominal pain, constipation, diarrhea, heartburn, hematochezia and nausea.   Breasts:  Negative for tenderness, breast mass and discharge.   Genitourinary: Negative for dysuria, frequency, genital sores, hematuria, pelvic pain, urgency, vaginal bleeding and vaginal discharge.   Musculoskeletal: Positive for arthralgias and joint swelling. Negative for myalgias.   Skin: Negative for rash.   Neurological: Negative for dizziness, weakness, headaches and paresthesias.   Psychiatric/Behavioral: Negative for mood changes. The patient is not nervous/anxious.          OBJECTIVE:   BP (!) 156/66   Pulse 56   Ht 1.594 m (5' 2.75\")   Wt 75.3 kg (166 lb)   BMI 29.64 kg/m   Estimated body mass index is 29.64 kg/m  as calculated from the following:    Height as of this encounter: 1.594 m (5' 2.75\").    Weight as of this encounter: 75.3 kg (166 lb).  Physical Exam  GENERAL APPEARANCE: healthy, alert and no distress  EYES: Eyes grossly normal to inspection, PERRL and conjunctivae and sclerae normal  HENT: hard of hearing  NECK: no adenopathy, no asymmetry, masses, or scars and thyroid normal to palpation  RESP: lungs clear to auscultation - no rales, rhonchi or " wheezes  CV: regular rate and rhythm, normal S1 S2, no S3 or S4, no murmur, click or rub, no peripheral edema and peripheral pulses strong  ABDOMEN: soft, nontender, no hepatosplenomegaly, no masses and bowel sounds normal  MS: no musculoskeletal defects are noted and gait is age appropriate without ataxia, left ankle swelling, very mildly tender   SKIN: no suspicious lesions or rashes  NEURO: Normal strength and tone, sensory exam grossly normal, mentation intact and speech normal  PSYCH: mentation appears normal and affect normal/bright    Diagnostic Test Results:  Labs reviewed in Epic    ASSESSMENT / PLAN:   Nika was seen today for wellness visit.    Diagnoses and all orders for this visit:    Encounter for Medicare annual wellness exam  Routine history and physical, updated in EMR.  She is finished with mammogram.  She has Cologuard at home but has not done.  DEXA ordered.  She declined immunization.  Follow-up in a year for next annual physical.    Essential familial hypercholesterolemia  On atorvastatin  -     Lipid Profile (Chol, Trig, HDL, LDL calc); Future  -     Comprehensive metabolic panel (BMP + Alb, Alk Phos, ALT, AST, Total. Bili, TP); Future  -     Lipid Profile (Chol, Trig, HDL, LDL calc)  -     Comprehensive metabolic panel (BMP + Alb, Alk Phos, ALT, AST, Total. Bili, TP)    Mild major depression (H)  Stable. Worried about money.   -     Comprehensive metabolic panel (BMP + Alb, Alk Phos, ALT, AST, Total. Bili, TP); Future  -     CBC with platelets; Future  -     Comprehensive metabolic panel (BMP + Alb, Alk Phos, ALT, AST, Total. Bili, TP)  -     CBC with platelets    Essential hypertension  Not well controlled. Will increase lisinopril to 40 mg daily. New rx sent.   -     Comprehensive metabolic panel (BMP + Alb, Alk Phos, ALT, AST, Total. Bili, TP); Future  -     Comprehensive metabolic panel (BMP + Alb, Alk Phos, ALT, AST, Total. Bili, TP)  -     lisinopril (ZESTRIL) 40 MG tablet; Take 1  "tablet (40 mg) by mouth daily    Vitamin D deficiency  -     Vitamin D Deficiency; Future  -     Vitamin D Deficiency    Osteopenia after menopause  Due for dexa.   -     Comprehensive metabolic panel (BMP + Alb, Alk Phos, ALT, AST, Total. Bili, TP); Future  -     DX Hip/Pelvis/Spine; Future  -     Comprehensive metabolic panel (BMP + Alb, Alk Phos, ALT, AST, Total. Bili, TP)    Postmenopausal status  -     DX Hip/Pelvis/Spine; Future    Left ankle swelling  History of left ankle joint replacement  Will check cbc and crp today. Advised pt to see her ankle surgeon again. Chronic swelling that has gotten a little bit worse recently. No systemic symptoms however.   -     CRP inflammation; Future  -     CRP inflammation    Foul smelling urine  -     UA with Microscopic reflex to Culture - lab collect; Future  -     UA with Microscopic reflex to Culture - lab collect  -     Urine Microscopic Exam    Other fatigue  Has sleep study scheduled 1/2023    History of tobacco abuse  In remission      Patient has been advised of split billing requirements and indicates understanding: Yes      COUNSELING:  Reviewed preventive health counseling, as reflected in patient instructions       Regular exercise       Healthy diet/nutrition    Estimated body mass index is 29.64 kg/m  as calculated from the following:    Height as of this encounter: 1.594 m (5' 2.75\").    Weight as of this encounter: 75.3 kg (166 lb).        She reports that she quit smoking about 15 years ago. Her smoking use included cigarettes. She has a 80.00 pack-year smoking history. She has never been exposed to tobacco smoke. She has never used smokeless tobacco.      Appropriate preventive services were discussed with this patient, including applicable screening as appropriate for cardiovascular disease, diabetes, osteopenia/osteoporosis, and glaucoma.  As appropriate for age/gender, discussed screening for colorectal cancer, prostate cancer, breast cancer, and " cervical cancer. Checklist reviewing preventive services available has been given to the patient.    Reviewed patients plan of care and provided an AVS. The Basic Care Plan (routine screening as documented in Health Maintenance) for Nika meets the Care Plan requirement. This Care Plan has been established and reviewed with the Patient.    Counseling Resources:  ATP IV Guidelines  Pooled Cohorts Equation Calculator  Breast Cancer Risk Calculator  Breast Cancer: Medication to Reduce Risk  FRAX Risk Assessment  ICSI Preventive Guidelines  Dietary Guidelines for Americans, 2010  [employer]'s MyPlate  ASA Prophylaxis  Lung CA Screening    Paula Weaver MD  [facility]    Identified Health Risks:  Answers for HPI/ROS submitted by the patient on 10/20/2022  If you checked off any problems, how difficult have these problems made it for you to do your work, take care of things at home, or get along with other people?: Not difficult at all  PHQ9 TOTAL SCORE: 12

## 2022-10-21 LAB — DEPRECATED CALCIDIOL+CALCIFEROL SERPL-MC: 53 UG/L (ref 20–75)

## 2022-10-22 LAB — BACTERIA UR CULT: ABNORMAL

## 2022-10-24 DIAGNOSIS — N30.00 ACUTE CYSTITIS WITHOUT HEMATURIA: Primary | ICD-10-CM

## 2022-10-24 RX ORDER — NITROFURANTOIN 25; 75 MG/1; MG/1
100 CAPSULE ORAL 2 TIMES DAILY
Qty: 20 CAPSULE | Refills: 0 | Status: SHIPPED | OUTPATIENT
Start: 2022-10-24 | End: 2022-11-03

## 2022-10-25 ENCOUNTER — TELEPHONE (OUTPATIENT)
Dept: FAMILY MEDICINE | Facility: CLINIC | Age: 77
End: 2022-10-25

## 2022-10-25 NOTE — TELEPHONE ENCOUNTER
----- Message from Paula Weaver MD sent at 10/24/2022 12:11 PM CDT -----  Please call pt and also send a result letter with below message:   Urine study shows infection. I have sent macrobid antibiotics to pharmacy.  Vitamin D in good range.   Inflammatory marker CRP was elevated but I assume this is from urinary tract infection and not from her left ankle swelling  Cholesterol looks fine.

## 2022-10-25 NOTE — LETTER
October 25, 2022      Nicole Navarrete  [address]   [place] [address]        Dear ,    We are writing to inform you of your test results.    Urine study shows infection. I have sent macrobid antibiotics to pharmacy.   Vitamin D in good range.   Inflammatory marker CRP was elevated but I assume this is from urinary tract infection and not from her left ankle swelling   Cholesterol looks fine.       Resulted Orders   Lipid Profile (Chol, Trig, HDL, LDL calc)   Result Value Ref Range    Cholesterol 150 <200 mg/dL    Triglycerides 99 <150 mg/dL    Direct Measure HDL 46 (L) >=50 mg/dL    LDL Cholesterol Calculated 84 <=100 mg/dL    Non HDL Cholesterol 104 <130 mg/dL    Narrative    Cholesterol  Desirable:  <200 mg/dL    Triglycerides  Normal:  Less than 150 mg/dL  Borderline High:  150-199 mg/dL  High:  200-499 mg/dL  Very High:  Greater than or equal to 500 mg/dL    Direct Measure HDL  Female:  Greater than or equal to 50 mg/dL   Male:  Greater than or equal to 40 mg/dL    LDL Cholesterol  Desirable:  <100mg/dL  Above Desirable:  100-129 mg/dL   Borderline High:  130-159 mg/dL   High:  160-189 mg/dL   Very High:  >= 190 mg/dL    Non HDL Cholesterol  Desirable:  130 mg/dL  Above Desirable:  130-159 mg/dL  Borderline High:  160-189 mg/dL  High:  190-219 mg/dL  Very High:  Greater than or equal to 220 mg/dL   Comprehensive metabolic panel (BMP + Alb, Alk Phos, ALT, AST, Total. Bili, TP)   Result Value Ref Range    Sodium 139 136 - 145 mmol/L    Potassium 4.1 3.4 - 5.3 mmol/L    Chloride 104 98 - 107 mmol/L    Carbon Dioxide (CO2) 25 22 - 29 mmol/L    Anion Gap 10 7 - 15 mmol/L    Urea Nitrogen 12.4 8.0 - 23.0 mg/dL    Creatinine 0.68 0.51 - 0.95 mg/dL    Calcium 10.2 8.8 - 10.2 mg/dL    Glucose 101 (H) 70 - 99 mg/dL    Alkaline Phosphatase 87 35 - 104 U/L    AST 20 10 - 35 U/L    ALT 15 10 - 35 U/L    Protein Total 7.0 6.4 - 8.3 g/dL    Albumin 4.4 3.5 - 5.2 g/dL    Bilirubin Total 0.9 <=1.2  mg/dL    GFR Estimate 89 >60 mL/min/1.73m2      Comment:      Effective December 21, 2021 eGFRcr in adults is calculated using the 2021 CKD-EPI creatinine equation which includes age and gender (Selma wallace al., [place], DOI: 10.1056/GLYAem2151622)   CBC with platelets   Result Value Ref Range    WBC Count 7.7 4.0 - 11.0 10e3/uL    RBC Count 4.52 3.80 - 5.20 10e6/uL    Hemoglobin 12.8 11.7 - 15.7 g/dL    Hematocrit 39.3 35.0 - 47.0 %    MCV 87 78 - 100 fL    MCH 28.3 26.5 - 33.0 pg    MCHC 32.6 31.5 - 36.5 g/dL    RDW 14.2 10.0 - 15.0 %    Platelet Count 210 150 - 450 10e3/uL   Vitamin D Deficiency   Result Value Ref Range    Vitamin D, Total (25-Hydroxy) 53 20 - 75 ug/L    Narrative    Season, race, dietary intake, and treatment affect the concentration of 25-hydroxy-Vitamin D. Values may decrease during winter months and increase during summer months. Values 20-29 ug/L may indicate Vitamin D insufficiency and values <20 ug/L may indicate Vitamin D deficiency.    Vitamin D determination is routinely performed by an immunoassay specific for 25 hydroxyvitamin D3.  If an individual is on vitamin D2(ergocalciferol) supplementation, please specify 25 OH vitamin D2 and D3 level determination by LCMSMS test VITD23.     CRP inflammation   Result Value Ref Range    CRP Inflammation 14.40 (H) <5.00 mg/L   UA with Microscopic reflex to Culture - lab collect   Result Value Ref Range    Color Urine Yellow Colorless, Straw, Light Yellow, Yellow    Appearance Urine Cloudy (A) Clear    Glucose Urine Negative Negative, 1000 , >=2000 mg/dL    Bilirubin Urine Negative Negative    Ketones Urine Negative Negative, 160  mg/dL    Specific Gravity Urine 1.020 1.005 - 1.030    Blood Urine Negative Negative    pH Urine 6.5 5.0 - 8.0    Protein Albumin Urine Negative Negative, 300 , >=2000 mg/dL    Urobilinogen Urine 0.2 0.2, 1.0 E.U./dL    Nitrite Urine Positive (A) Negative    Leukocyte Esterase Urine Trace (A) Negative   Urine Microscopic Exam    Result Value Ref Range    Bacteria Urine Few (A) None Seen /HPF    RBC Urine 0-2 0-2 /HPF /HPF    WBC Urine 25-50 (A) 0-5 /HPF /HPF    Squamous Epithelials Urine Many (A) None Seen /LPF   Urine Culture   Result Value Ref Range    Culture >100,000 CFU/mL Klebsiella pneumoniae (A)        If you have any questions or concerns, please call the clinic at the number listed above.       Sincerely,

## 2023-01-06 ENCOUNTER — OFFICE VISIT (OUTPATIENT)
Dept: FAMILY MEDICINE | Facility: CLINIC | Age: 78
End: 2023-01-06
Payer: COMMERCIAL

## 2023-01-06 VITALS
DIASTOLIC BLOOD PRESSURE: 82 MMHG | SYSTOLIC BLOOD PRESSURE: 170 MMHG | RESPIRATION RATE: 20 BRPM | BODY MASS INDEX: 29.64 KG/M2 | HEART RATE: 54 BPM | WEIGHT: 166 LBS

## 2023-01-06 DIAGNOSIS — I10 ESSENTIAL HYPERTENSION: Primary | ICD-10-CM

## 2023-01-06 PROCEDURE — 99213 OFFICE O/P EST LOW 20 MIN: CPT | Performed by: FAMILY MEDICINE

## 2023-01-06 ASSESSMENT — ANXIETY QUESTIONNAIRES
3. WORRYING TOO MUCH ABOUT DIFFERENT THINGS: MORE THAN HALF THE DAYS
2. NOT BEING ABLE TO STOP OR CONTROL WORRYING: MORE THAN HALF THE DAYS
GAD7 TOTAL SCORE: 7
6. BECOMING EASILY ANNOYED OR IRRITABLE: SEVERAL DAYS
GAD7 TOTAL SCORE: 7
8. IF YOU CHECKED OFF ANY PROBLEMS, HOW DIFFICULT HAVE THESE MADE IT FOR YOU TO DO YOUR WORK, TAKE CARE OF THINGS AT HOME, OR GET ALONG WITH OTHER PEOPLE?: SOMEWHAT DIFFICULT
GAD7 TOTAL SCORE: 7
1. FEELING NERVOUS, ANXIOUS, OR ON EDGE: SEVERAL DAYS
IF YOU CHECKED OFF ANY PROBLEMS ON THIS QUESTIONNAIRE, HOW DIFFICULT HAVE THESE PROBLEMS MADE IT FOR YOU TO DO YOUR WORK, TAKE CARE OF THINGS AT HOME, OR GET ALONG WITH OTHER PEOPLE: SOMEWHAT DIFFICULT
7. FEELING AFRAID AS IF SOMETHING AWFUL MIGHT HAPPEN: SEVERAL DAYS
5. BEING SO RESTLESS THAT IT IS HARD TO SIT STILL: NOT AT ALL
7. FEELING AFRAID AS IF SOMETHING AWFUL MIGHT HAPPEN: SEVERAL DAYS
4. TROUBLE RELAXING: NOT AT ALL

## 2023-01-06 ASSESSMENT — PATIENT HEALTH QUESTIONNAIRE - PHQ9
SUM OF ALL RESPONSES TO PHQ QUESTIONS 1-9: 9
10. IF YOU CHECKED OFF ANY PROBLEMS, HOW DIFFICULT HAVE THESE PROBLEMS MADE IT FOR YOU TO DO YOUR WORK, TAKE CARE OF THINGS AT HOME, OR GET ALONG WITH OTHER PEOPLE: NOT DIFFICULT AT ALL
SUM OF ALL RESPONSES TO PHQ QUESTIONS 1-9: 9

## 2023-01-06 NOTE — PROGRESS NOTES
Assessment & Plan     Essential hypertension  BP still high. She thinks she's still only taking 20 mg of lisinopril. She will go home and check and let me know        Return in about 9 months (around 10/6/2023) for Routine preventive, with me, in person.    Paula Weaver MD  [facility] KENNY Rivera is a 77 year old, presenting for the following health issues:  Blood Pressure Check      History of Present Illness       Reason for visit:  Followup    She eats 2-3 servings of fruits and vegetables daily.She exercises with enough effort to increase her heart rate 9 or less minutes per day.  She is missing 1 dose(s) of medications per week.  She is not taking prescribed medications regularly due to remembering to take.    Today's PHQ-9         PHQ-9 Total Score: 9    PHQ-9 Q9 Thoughts of better off dead/self-harm past 2 weeks :   Not at all    How difficult have these problems made it for you to do your work, take care of things at home, or get along with other people: Not difficult at all  Today's RAMILA-7 Score: 7       Hypertension Follow-up      Do you check your blood pressure regularly outside of the clinic? No     Are you following a low salt diet? No    Are your blood pressures ever more than 140 on the top number (systolic) OR more than 90 on the bottom number (diastolic), for example 140/90? Yes    She lives in a trailer complex  Had to shovel snow herself as the snow company didn't show up.     Review of Systems   Constitutional, HEENT, cardiovascular, pulmonary, gi and gu systems are negative, except as otherwise noted.      Objective    BP (!) 152/67 (BP Location: Left arm, Patient Position: Sitting, Cuff Size: Adult Regular)   Pulse 54   Resp 20   Wt 75.3 kg (166 lb)   BMI 29.64 kg/m    Body mass index is 29.64 kg/m .  Physical Exam   GENERAL: healthy, alert and no distress  NECK: no adenopathy, no asymmetry, masses, or scars and thyroid normal to  palpation  RESP: lungs clear to auscultation - no rales, rhonchi or wheezes  CV: regular rate and rhythm, normal S1 S2, no S3 or S4, no murmur, click or rub  MS: no gross musculoskeletal defects noted, no edema  NEURO: Normal strength and tone, mentation intact and speech normal  PSYCH: mentation appears normal, affect normal/bright

## 2023-01-06 NOTE — PATIENT INSTRUCTIONS

## 2023-01-10 ENCOUNTER — TELEPHONE (OUTPATIENT)
Dept: FAMILY MEDICINE | Facility: CLINIC | Age: 78
End: 2023-01-10

## 2023-01-10 DIAGNOSIS — I10 ESSENTIAL HYPERTENSION: Primary | ICD-10-CM

## 2023-01-10 RX ORDER — HYDROCHLOROTHIAZIDE 25 MG/1
25 TABLET ORAL DAILY
Qty: 90 TABLET | Refills: 1 | Status: SHIPPED | OUTPATIENT
Start: 2023-01-10 | End: 2023-07-05

## 2023-01-10 NOTE — TELEPHONE ENCOUNTER
Please call pt to confirm dosing of lisinopril she's taking:    Patient has been taking 40 mg of lisinopril daily.    Damion Harris RN     [facility]

## 2023-01-10 NOTE — TELEPHONE ENCOUNTER
Pls let her know I added on hydrochlorothiazide 25 mg daily for her. She needs to return in 2-3 weeks for nurse only BP check.

## 2023-01-10 NOTE — TELEPHONE ENCOUNTER
Called patient and scheduled for BP check and also informed of hydrochlorothiazide.    Damion Harris RN     [facility]

## 2023-01-17 ENCOUNTER — TELEPHONE (OUTPATIENT)
Dept: FAMILY MEDICINE | Facility: CLINIC | Age: 78
End: 2023-01-17
Payer: COMMERCIAL

## 2023-01-17 NOTE — TELEPHONE ENCOUNTER
Incoming call from pt requesting a call back:   Pt is wondering if she should be taking hydrochlorothiazide (HYDRODIURIL) 25 MG tablet, She states that she is worried that the medication will interact with the amount of water she drinks daily.  Please call back [phone] ok to leave VM

## 2023-01-18 NOTE — TELEPHONE ENCOUNTER
Called and spoke with patient. Patient stated understanding and is in agreement with plan. Encouraged callback if any side effects or with any additional questions/concerns.    Anya Smith RN

## 2023-01-18 NOTE — TELEPHONE ENCOUNTER
I think she should try it first. She should take med in the morning. If she notices side effects she can let me know.

## 2023-01-25 ENCOUNTER — THERAPY VISIT (OUTPATIENT)
Dept: SLEEP MEDICINE | Facility: CLINIC | Age: 78
End: 2023-01-25
Payer: COMMERCIAL

## 2023-01-25 DIAGNOSIS — R06.00 DYSPNEA AND RESPIRATORY ABNORMALITY: ICD-10-CM

## 2023-01-25 DIAGNOSIS — G47.10 HYPERSOMNIA: ICD-10-CM

## 2023-01-25 DIAGNOSIS — Z72.820 LACK OF ADEQUATE SLEEP: ICD-10-CM

## 2023-01-25 DIAGNOSIS — R06.89 DYSPNEA AND RESPIRATORY ABNORMALITY: ICD-10-CM

## 2023-01-25 DIAGNOSIS — I10 ESSENTIAL HYPERTENSION: ICD-10-CM

## 2023-01-25 DIAGNOSIS — R53.83 OTHER FATIGUE: ICD-10-CM

## 2023-01-25 PROCEDURE — 95810 POLYSOM 6/> YRS 4/> PARAM: CPT | Performed by: INTERNAL MEDICINE

## 2023-01-26 PROBLEM — E66.3 OVERWEIGHT: Status: ACTIVE | Noted: 2022-10-13

## 2023-01-26 PROBLEM — M85.80 OSTEOPENIA AFTER MENOPAUSE: Status: ACTIVE | Noted: 2020-09-18

## 2023-01-26 PROBLEM — N63.0 LUMP OR MASS IN BREAST: Status: ACTIVE | Noted: 2022-10-13

## 2023-01-26 PROBLEM — Z78.0 OSTEOPENIA AFTER MENOPAUSE: Status: ACTIVE | Noted: 2020-09-18

## 2023-01-26 PROBLEM — H91.93 BILATERAL HEARING LOSS: Chronic | Status: ACTIVE | Noted: 2022-10-14

## 2023-01-26 PROBLEM — E66.3 OVERWEIGHT: Chronic | Status: ACTIVE | Noted: 2022-10-13

## 2023-01-26 NOTE — PROCEDURES
" SLEEP STUDY INTERPRETATION  DIAGNOSTIC POLYSOMNOGRAPHY REPORT      Patient: YONNY GERBER  YOB: 1945  Study Date: 1/25/2023  MRN: [id number]  Referring Provider: Paula Weaver MD  Ordering Provider: James Rasmussen PA-C    Indications for Polysomnography: The patient is a 77 year old Female who is 5' 3\" and weighs 166.0 lbs. Her BMI is 29.8, [place] sleepiness scale 11/24 and neck circumference is 38 cm. A diagnostic polysomnogram was performed to evaluate for loud snoring, non-refreshing sleep, bruxism, daytime sleepiness (ESS 11) , difficulty maintaining sleep, crowded oropharynx and co-morbid HTN.    Polysomnogram Data: A full night polysomnogram recorded the standard physiologic parameters including EEG, EOG, EMG, ECG, nasal and oral airflow. Respiratory parameters of chest and abdominal movements were recorded with respiratory inductance plethysmography. Oxygen saturation was recorded by pulse oximetry. Hypopnea scoring rule used: 1B 4%.    Sleep Architecture:   The total recording time of the polysomnogram was 538.5 minutes. The total sleep time was 351.0 minutes. Sleep latency was increased at 47.0 minutes with the use of a sleep aid (over the counter sleep medication: 1 tablet Somnapure). REM latency was 301.5 minutes. Arousal index was increased at 43.8 arousals per hour. Sleep efficiency was decreased at 65.2%. Wake after sleep onset was 140.0 minutes. The patient spent 14.2% of total sleep time in Stage N1, 58.5% in Stage N2, 20.8% in Stage N3, and 6.4% in REM. Time in REM supine was 0 minutes.    Respiration:     Events ? The polysomnogram revealed a presence of 7 obstructive, 4 central, and 0 mixed apneas resulting in an apnea index of 1.9 events per hour. There were 11 obstructive hypopneas and 0 central hypopneas resulting in an obstructive hypopnea index of 1.9 and central hypopnea index of 0 events per hour. The combined apnea/hypopnea index was 3.8 events per hour (central " apnea/hypopnea index was 0.7 events per hour). The REM AHI was 0 events per hour. The supine AHI was 2.4 events per hour. The RERA index was 3.9 events per hour.  The RDI was 7.7 events per hour.    Snoring - was reported as mild to loud.    Respiratory rate and pattern - was notable for normal respiratory rate and pattern.    Sustained Sleep Associated Hypoventilation - Transcutaneous carbon dioxide monitoring was not used, however significant hypoventilation was not suggested by oximetry    Sleep Associated Hypoxemia - (Greater than 5 minutes O2 sat at or below 88%) Baseline oxygen saturation was 91.9%. Lowest oxygen saturation was 87.0%. Time spent less than or equal to 88% was 0.7 minutes. Time spent less than or equal to 89% was 11.6 minutes.    Movement Activity:     Periodic Limb Activity - There were 313 PLMs during the entire study. The PLM index was 53.5 movements per hour. The PLM Arousal Index was 20.2 per hour.    REM EMG Activity - Excessive transient/sustained muscle activity was not present.    Nocturnal Behavior - Abnormal sleep related behaviors were not noted     Bruxism - None apparent.        Cardiac Summary:   The average pulse rate was 53.8 bpm. The minimum pulse rate was 46.0 bpm while the maximum pulse rate was 70.0 bpm.  Arrhythmias were not noted.      Assessment:     Evidence for sleep disordered breathing but not cindy obstructive sleep apnea    Periodic limb movements of sleep with arousals      Recommendations:    Advice regarding the risks of drowsy driving.    Suggest optimizing sleep schedule and avoiding sleep deprivation.    Weight management (if BMI > 30).    Pharmacologic therapy should be used for management of restless legs syndrome only if present and clinically indicated and not based on the presence of periodic limb movements alone.    Diagnostic Codes:   Snoring R06.89      _____________________________________   Electronically Signed By: Antonio Gong MD 1/26/23            Range(%) Time in range (min)   0.0 - 89.0 11.6   0.0 - 88.0 0.7         Stage Min(mm Hg) Max(mm Hg)   Wake - -   NREM(1+2+3) - -   REM - -       Range(mmHg) Time in range (min)   55.0 - 100.0 -   Excluded data <20.0 & >65.0 538.5

## 2023-01-27 LAB — SLPCOMP: NORMAL

## 2023-01-31 ENCOUNTER — ALLIED HEALTH/NURSE VISIT (OUTPATIENT)
Dept: FAMILY MEDICINE | Facility: CLINIC | Age: 78
End: 2023-01-31
Payer: COMMERCIAL

## 2023-01-31 VITALS — HEART RATE: 69 BPM | DIASTOLIC BLOOD PRESSURE: 74 MMHG | SYSTOLIC BLOOD PRESSURE: 133 MMHG

## 2023-01-31 DIAGNOSIS — I10 ESSENTIAL HYPERTENSION: Primary | ICD-10-CM

## 2023-01-31 PROCEDURE — 99207 PR NO CHARGE NURSE ONLY: CPT

## 2023-01-31 NOTE — PROGRESS NOTES
Follow Up Blood Pressure Check    Nika Navarrete is a 77 year old female recommended to follow up for blood pressure check by Paula Weaver. Anihypertensive medications and adherence were verified: Yes.     Reason for visit:  01/10/2023  Pls let her know I added on hydrochlorothiazide 25 mg daily for her. She needs to return in 2-3 weeks for nurse only BP check.   01/06/2023  Essential hypertension  BP still high. She thinks she's still only taking 20 mg of lisinopril. She will go home and check and let me know.    Patient also has additional questions.   She would like the results of her sleep study. Please call with results and mail a copy home with doctor notation on it.       She also brought all of her medications in, she wanted to review them with Dr. Weaver.  I asked if I could help, she declined, wanted to discuss with Dr. Weaver.  Advised she make an appointment.  She declined and wanted me to send the message to Dr. Weaver and let her advise what to do.      Medication change at last visit: Added hydrochlorothiazide 25 mg.  States since starting this medication, she has had no leaking of urine.     Today's Vitals:   Vitals:    01/31/23 1050   BP: 133/74   Pulse: 69       Home blood pressure readings brought in today:   Does not check at home.    Lowest blood pressure today is less than 140/90 and they deny any new symptoms.          Terri Ramos    Current Outpatient Medications   Medication Sig Dispense Refill     hydrochlorothiazide (HYDRODIURIL) 25 MG tablet Take 1 tablet (25 mg) by mouth daily 90 tablet 1     lisinopril (ZESTRIL) 40 MG tablet Take 1 tablet (40 mg) by mouth daily 90 tablet 3     atorvastatin (LIPITOR) 40 MG tablet Take 1 tablet (40 mg) by mouth At Bedtime 90 tablet 1     ergocalciferol (VITAMIN D2) 400 units (10 mcg) TABS tablet Take 400 Units by mouth daily Take 2 capsules once daily       FLUoxetine (PROZAC) 10 MG capsule Take 2 capsules (20 mg) by mouth daily 90  capsule 1     UNABLE TO FIND MEDICATION NAME: Green tea capsule for weight

## 2023-02-02 NOTE — PROGRESS NOTES
Please print the sleep visit note from [employer] and mail to patient.   No change in her current medications. If she wants to review medication with me please come in for a visit (any reserved slot ok). She's very hard of hearing so going through it on the phone would not be productive.

## 2023-02-09 ENCOUNTER — OFFICE VISIT (OUTPATIENT)
Dept: SLEEP MEDICINE | Facility: CLINIC | Age: 78
End: 2023-02-09
Payer: COMMERCIAL

## 2023-02-09 VITALS
OXYGEN SATURATION: 95 % | SYSTOLIC BLOOD PRESSURE: 124 MMHG | BODY MASS INDEX: 28.6 KG/M2 | WEIGHT: 161.4 LBS | HEART RATE: 65 BPM | HEIGHT: 63 IN | DIASTOLIC BLOOD PRESSURE: 83 MMHG

## 2023-02-09 DIAGNOSIS — R06.89 DYSPNEA AND RESPIRATORY ABNORMALITY: ICD-10-CM

## 2023-02-09 DIAGNOSIS — R06.00 DYSPNEA AND RESPIRATORY ABNORMALITY: ICD-10-CM

## 2023-02-09 DIAGNOSIS — G47.10 HYPERSOMNIA: Primary | ICD-10-CM

## 2023-02-09 PROCEDURE — 99214 OFFICE O/P EST MOD 30 MIN: CPT | Performed by: PHYSICIAN ASSISTANT

## 2023-02-09 ASSESSMENT — SLEEP AND FATIGUE QUESTIONNAIRES
HOW LIKELY ARE YOU TO NOD OFF OR FALL ASLEEP WHILE WATCHING TV: HIGH CHANCE OF DOZING
HOW LIKELY ARE YOU TO NOD OFF OR FALL ASLEEP WHILE SITTING AND READING: HIGH CHANCE OF DOZING
HOW LIKELY ARE YOU TO NOD OFF OR FALL ASLEEP WHILE SITTING AND TALKING TO SOMEONE: WOULD NEVER DOZE
HOW LIKELY ARE YOU TO NOD OFF OR FALL ASLEEP WHILE SITTING QUIETLY AFTER LUNCH WITHOUT ALCOHOL: SLIGHT CHANCE OF DOZING
HOW LIKELY ARE YOU TO NOD OFF OR FALL ASLEEP WHILE LYING DOWN TO REST IN THE AFTERNOON WHEN CIRCUMSTANCES PERMIT: HIGH CHANCE OF DOZING
HOW LIKELY ARE YOU TO NOD OFF OR FALL ASLEEP WHILE SITTING INACTIVE IN A PUBLIC PLACE: MODERATE CHANCE OF DOZING
HOW LIKELY ARE YOU TO NOD OFF OR FALL ASLEEP IN A CAR, WHILE STOPPED FOR A FEW MINUTES IN TRAFFIC: WOULD NEVER DOZE
HOW LIKELY ARE YOU TO NOD OFF OR FALL ASLEEP WHEN YOU ARE A PASSENGER IN A CAR FOR AN HOUR WITHOUT A BREAK: MODERATE CHANCE OF DOZING

## 2023-02-09 NOTE — PROGRESS NOTES
Sleep Study Follow-Up Visit:    Date on this visit: 2/9/2023    Nika Navarrete comes in today for follow-up of her sleep study done on 1/25/2023 at the [facility]. A diagnostic polysomnogram was performed to evaluate for loud snoring, non-refreshing sleep, bruxism, daytime sleepiness (ESS 11), difficulty maintaining sleep, crowded oropharynx and co-morbid HTN.     Polysomnogram as interpreted by Dr. Gong:  Sleep Architecture:   The total recording time of the polysomnogram was 538.5 minutes. The total sleep time was 351.0 minutes. Sleep latency was increased at 47.0 minutes with the use of a sleep aid (over the counter sleep medication: 1 tablet Somnapure). REM latency was 301.5 minutes. Arousal index was increased at 43.8 arousals per hour. Sleep efficiency was decreased at 65.2%. Wake after sleep onset was 140.0 minutes. The patient spent 14.2% of total sleep time in Stage N1, 58.5% in Stage N2, 20.8% in Stage N3, and 6.4% in REM. Time in REM supine was 0 minutes.     Respiration:     Events ? The polysomnogram revealed a presence of 7 obstructive, 4 central, and 0 mixed apneas resulting in an apnea index of 1.9 events per hour. There were 11 obstructive hypopneas and 0 central hypopneas resulting in an obstructive hypopnea index of 1.9 and central hypopnea index of 0 events per hour. The combined apnea/hypopnea index was 3.8 events per hour (central apnea/hypopnea index was 0.7 events per hour). The REM AHI was 0 events per hour. The supine AHI was 2.4 events per hour. The RERA index was 3.9 events per hour.  The RDI was 7.7 events per hour.    Snoring - was reported as mild to loud.    Respiratory rate and pattern - was notable for normal respiratory rate and pattern.    Sustained Sleep Associated Hypoventilation - Transcutaneous carbon dioxide monitoring was not used, however significant hypoventilation was not suggested by oximetry    Sleep Associated Hypoxemia - (Greater  than 5 minutes O2 sat at or below 88%) Baseline oxygen saturation was 91.9%. Lowest oxygen saturation was 87.0%. Time spent less than or equal to 88% was 0.7 minutes. Time spent less than or equal to 89% was 11.6 minutes.     Movement Activity:     Periodic Limb Activity - There were 313 PLMs during the entire study. The PLM index was 53.5 movements per hour. The PLM Arousal Index was 20.2 per hour.    REM EMG Activity - Excessive transient/sustained muscle activity was not present.    Nocturnal Behavior - Abnormal sleep related behaviors were not noted     Bruxism - None apparent.  Cardiac Summary:   The average pulse rate was 53.8 bpm. The minimum pulse rate was 46.0 bpm while the maximum pulse rate was 70.0 bpm.  Arrhythmias were not noted.    Past medical/surgical history, family history, social history, medications and allergies were reviewed.      Problem List:  Patient Active Problem List    Diagnosis Date Noted     Bilateral hearing loss 10/14/2022     Priority: Medium     Overweight 10/13/2022     Priority: Medium     Lump or mass in breast 10/13/2022     Priority: Medium     Osteopenia after menopause 09/18/2020     Priority: Medium     Advanced directives, counseling/discussion 07/10/2012     Priority: Medium     Discussed Advance Directive planning with patient; information given to patient to review.       Mild major depression (H)      Priority: Medium     History of colonic polyps 02/16/2012     Priority: Medium     2001 colonoscopy showed tubular adenoma at 30 cm. Repeat done at 2003.        Hyperlipidemia LDL goal <130 10/31/2010     Priority: Medium     Hypertension 10/20/2009     Priority: Medium     Diverticulosis of large intestine      Priority: Medium     Osteoarthritis 01/16/2007     Priority: Medium     hands, knees , hip, ankle, spine          Impression/Plan:  The patient's sleep disordered breathing did not reach a level of clinical significance with AHI of 3.8 and RDI 7.7 events per  hour. This was a good study that included time spent in REM sleep.       These findings were reviewed with patient.   She did not sleep well due to the cold lab  Patient had elevated periodic limp movements during the study. The majority of these were not associated with cortical arousal. Patient denies wakeful motor restlessness.   She has a mild daytime sleepiness. She reports napping. She states that she loves her daily naps and does not feel like she has a sleep problem.   Weight loss is recommended to address long term risk of sleep apnea.    She will follow up with me as needed.     30 minutes spent on day of encounter doing chart review,  history and exam, counseling, coordinating plan of care, documentation and further activities as noted above.      James Rasmussen PA-C  Sleep Medicine    CC: Paula Weaver Tram

## 2023-02-14 DIAGNOSIS — F32.0 MILD MAJOR DEPRESSION (H): ICD-10-CM

## 2023-02-16 RX ORDER — FLUOXETINE 10 MG/1
20 CAPSULE ORAL DAILY
Qty: 90 CAPSULE | Refills: 1 | Status: SHIPPED | OUTPATIENT
Start: 2023-02-16 | End: 2023-06-08

## 2023-02-16 NOTE — TELEPHONE ENCOUNTER
"Routing refill request to provider for review/approval because:  PHQ-9 score    Last Written Prescription Date:  8/22/2022  Last Fill Quantity: 90,  # refills: 1   Last office visit provider:  1/6/2023     Requested Prescriptions   Pending Prescriptions Disp Refills     FLUoxetine (PROZAC) 10 MG capsule 90 capsule 1     Sig: Take 2 capsules (20 mg) by mouth daily       SSRIs Protocol Failed - 2/14/2023 12:29 PM        Failed - PHQ-9 score less than 5 in past 6 months     Please review last PHQ-9 score.           Passed - Medication is active on med list        Passed - Patient is age 18 or older        Passed - No active pregnancy on record        Passed - No positive pregnancy test in last 12 months        Passed - Recent (6 mo) or future (30 days) visit within the authorizing provider's specialty     Patient had office visit in the last 6 months or has a visit in the next 30 days with authorizing provider or within the authorizing provider's specialty.  See \"Patient Info\" tab in inbasket, or \"Choose Columns\" in Meds & Orders section of the refill encounter.                 DEMIAN ZAMORANO RN 02/16/23 12:01 PM  "

## 2023-03-27 ENCOUNTER — HOSPITAL ENCOUNTER (OUTPATIENT)
Dept: BONE DENSITY | Facility: HOSPITAL | Age: 78
Discharge: HOME | End: 2023-03-27
Attending: FAMILY MEDICINE | Admitting: FAMILY MEDICINE
Payer: COMMERCIAL

## 2023-03-27 DIAGNOSIS — M85.80 OSTEOPENIA AFTER MENOPAUSE: ICD-10-CM

## 2023-03-27 DIAGNOSIS — Z78.0 POSTMENOPAUSAL STATUS: ICD-10-CM

## 2023-03-27 DIAGNOSIS — Z78.0 OSTEOPENIA AFTER MENOPAUSE: ICD-10-CM

## 2023-03-27 PROCEDURE — 77081 DXA BONE DENSITY APPENDICULR: CPT

## 2023-03-27 PROCEDURE — 77080 DXA BONE DENSITY AXIAL: CPT

## 2023-03-28 ENCOUNTER — TELEPHONE (OUTPATIENT)
Dept: FAMILY MEDICINE | Facility: CLINIC | Age: 78
End: 2023-03-28
Payer: COMMERCIAL

## 2023-03-28 NOTE — TELEPHONE ENCOUNTER
----- Message from Damion Harris RN sent at 3/28/2023  1:19 PM CDT -----    ----- Message -----  From: Paula Weaver MD  Sent: 3/28/2023   1:18 PM CDT  To: [facility]    Please call pt: bone density normal. Repeat in 3-4 years. Please send result letter with this message.

## 2023-04-04 ENCOUNTER — TELEPHONE (OUTPATIENT)
Dept: FAMILY MEDICINE | Facility: CLINIC | Age: 78
End: 2023-04-04
Payer: COMMERCIAL

## 2023-04-04 NOTE — TELEPHONE ENCOUNTER
----- Message from Paula Weaver MD sent at 4/2/2023  9:46 PM CDT -----  Please let pt know: bone density is normal. Repeat in 3-4 years. Please send result letter with this message.

## 2023-04-04 NOTE — TELEPHONE ENCOUNTER
Patient called back and I relayed the message from Dr. Weaver about her bone test. She stated she understood and had no further questions.

## 2023-04-25 DIAGNOSIS — E78.01 ESSENTIAL FAMILIAL HYPERCHOLESTEROLEMIA: ICD-10-CM

## 2023-04-26 RX ORDER — ATORVASTATIN CALCIUM 40 MG/1
TABLET, FILM COATED ORAL
Qty: 90 TABLET | Refills: 2 | Status: SHIPPED | OUTPATIENT
Start: 2023-04-26 | End: 2024-02-07

## 2023-04-26 NOTE — TELEPHONE ENCOUNTER
"Last Written Prescription Date:  10/18/2022  Last Fill Quantity: 90,  # refills: 1   Last office visit provider:  1/6/2023     Requested Prescriptions   Pending Prescriptions Disp Refills     atorvastatin (LIPITOR) 40 MG tablet [Pharmacy Med Name: ATORVASTATIN 40 MG TABLET] 90 tablet 1     Sig: TAKE 1 TABLET BY MOUTH AT BEDTIME       Statins Protocol Passed - 4/26/2023 12:36 PM        Passed - LDL on file in past 12 months     Recent Labs   Lab Test 10/20/22  1235   LDL 84             Passed - No abnormal creatine kinase in past 12 months     Recent Labs   Lab Test 06/23/22  1129                   Passed - Recent (12 mo) or future (30 days) visit within the authorizing provider's specialty     Patient has had an office visit with the authorizing provider or a provider within the authorizing providers department within the previous 12 mos or has a future within next 30 days. See \"Patient Info\" tab in inbasket, or \"Choose Columns\" in Meds & Orders section of the refill encounter.              Passed - Medication is active on med list        Passed - Patient is age 18 or older        Passed - No active pregnancy on record        Passed - No positive pregnancy test in past 12 months             Aylin Handley RN 04/26/23 12:36 PM  "

## 2023-06-08 DIAGNOSIS — F32.0 MILD MAJOR DEPRESSION (H): ICD-10-CM

## 2023-06-08 RX ORDER — FLUOXETINE 10 MG/1
CAPSULE ORAL
Qty: 180 CAPSULE | Refills: 0 | Status: SHIPPED | OUTPATIENT
Start: 2023-06-08 | End: 2023-09-07

## 2023-06-08 NOTE — TELEPHONE ENCOUNTER
"Routing refill request to provider for review/approval because:  PHQ-9    Last Written Prescription Date:  2/16/2023  Last Fill Quantity: 90,  # refills: 1   Last office visit provider:  1/6/2023     Requested Prescriptions   Pending Prescriptions Disp Refills     FLUoxetine (PROZAC) 10 MG capsule [Pharmacy Med Name: FLUOXETINE HCL 10 MG CAPSULE] 180 capsule      Sig: TAKE 2 CAPSULES BY MOUTH EVERY DAY       SSRIs Protocol Failed - 6/8/2023  2:46 AM        Failed - PHQ-9 score less than 5 in past 6 months     Please review last PHQ-9 score.           Passed - Medication is active on med list        Passed - Patient is age 18 or older        Passed - No active pregnancy on record        Passed - No positive pregnancy test in last 12 months        Passed - Recent (6 mo) or future (30 days) visit within the authorizing provider's specialty     Patient had office visit in the last 6 months or has a visit in the next 30 days with authorizing provider or within the authorizing provider's specialty.  See \"Patient Info\" tab in inbasket, or \"Choose Columns\" in Meds & Orders section of the refill encounter.                 Samina Gray RN 06/08/23 1:48 PM      "

## 2023-07-05 DIAGNOSIS — I10 ESSENTIAL HYPERTENSION: ICD-10-CM

## 2023-07-05 RX ORDER — HYDROCHLOROTHIAZIDE 25 MG/1
TABLET ORAL
Qty: 90 TABLET | Refills: 1 | Status: SHIPPED | OUTPATIENT
Start: 2023-07-05 | End: 2024-01-26

## 2023-07-05 NOTE — TELEPHONE ENCOUNTER
"Last Written Prescription Date:1/10/2023  Last Fill Quantity: 90,  # refills: 1   Last office visit provider: 1/6/2023 with PCP DR MARC Weaver     Requested Prescriptions   Pending Prescriptions Disp Refills     hydrochlorothiazide (HYDRODIURIL) 25 MG tablet [Pharmacy Med Name: HYDROCHLOROTHIAZIDE 25 MG TAB] 90 tablet 1     Sig: TAKE 1 TABLET BY MOUTH EVERY DAY       Diuretics (Including Combos) Protocol Passed - 7/5/2023  1:03 PM        Passed - Blood pressure under 140/90 in past 12 months     BP Readings from Last 3 Encounters:   02/09/23 124/83   01/31/23 133/74   01/06/23 (!) 170/82                 Passed - Recent (12 mo) or future (30 days) visit within the authorizing provider's specialty     Patient has had an office visit with the authorizing provider or a provider within the authorizing providers department within the previous 12 mos or has a future within next 30 days. See \"Patient Info\" tab in inbasket, or \"Choose Columns\" in Meds & Orders section of the refill encounter.              Passed - Medication is active on med list        Passed - Patient is age 18 or older        Passed - No active pregancy on record        Passed - Normal serum creatinine on file in past 12 months     Recent Labs   Lab Test 10/20/22  1235   CR 0.68              Passed - Normal serum potassium on file in past 12 months     Recent Labs   Lab Test 10/20/22  1235   POTASSIUM 4.1                    Passed - Normal serum sodium on file in past 12 months     Recent Labs   Lab Test 10/20/22  1235                 Passed - No positive pregnancy test in past 12 months             Shaista Rosario RN 07/05/23 4:20 PM  "

## 2023-09-07 ENCOUNTER — OFFICE VISIT (OUTPATIENT)
Dept: FAMILY MEDICINE | Facility: CLINIC | Age: 78
End: 2023-09-07
Payer: COMMERCIAL

## 2023-09-07 VITALS
HEIGHT: 63 IN | SYSTOLIC BLOOD PRESSURE: 125 MMHG | TEMPERATURE: 97.2 F | BODY MASS INDEX: 29.62 KG/M2 | WEIGHT: 167.2 LBS | HEART RATE: 65 BPM | RESPIRATION RATE: 16 BRPM | DIASTOLIC BLOOD PRESSURE: 79 MMHG | OXYGEN SATURATION: 98 %

## 2023-09-07 DIAGNOSIS — Z13.0 SCREENING, ANEMIA, DEFICIENCY, IRON: ICD-10-CM

## 2023-09-07 DIAGNOSIS — Z00.00 ENCOUNTER FOR MEDICARE ANNUAL WELLNESS EXAM: Primary | ICD-10-CM

## 2023-09-07 DIAGNOSIS — I10 ESSENTIAL HYPERTENSION: ICD-10-CM

## 2023-09-07 DIAGNOSIS — M25.50 PAIN IN JOINT INVOLVING MULTIPLE SITES: ICD-10-CM

## 2023-09-07 DIAGNOSIS — Z87.891 HISTORY OF TOBACCO ABUSE: ICD-10-CM

## 2023-09-07 DIAGNOSIS — M79.622 PAIN OF LEFT UPPER ARM: ICD-10-CM

## 2023-09-07 DIAGNOSIS — F32.0 MILD MAJOR DEPRESSION (H): ICD-10-CM

## 2023-09-07 DIAGNOSIS — Z78.0 OSTEOPENIA AFTER MENOPAUSE: ICD-10-CM

## 2023-09-07 DIAGNOSIS — E55.9 VITAMIN D DEFICIENCY: ICD-10-CM

## 2023-09-07 DIAGNOSIS — E78.01 ESSENTIAL FAMILIAL HYPERCHOLESTEROLEMIA: ICD-10-CM

## 2023-09-07 DIAGNOSIS — M85.80 OSTEOPENIA AFTER MENOPAUSE: ICD-10-CM

## 2023-09-07 DIAGNOSIS — R73.09 ELEVATED GLUCOSE: ICD-10-CM

## 2023-09-07 LAB
ALBUMIN SERPL BCG-MCNC: 4.6 G/DL (ref 3.5–5.2)
ALP SERPL-CCNC: 77 U/L (ref 35–104)
ALT SERPL W P-5'-P-CCNC: 21 U/L (ref 0–50)
ANION GAP SERPL CALCULATED.3IONS-SCNC: 11 MMOL/L (ref 7–15)
AST SERPL W P-5'-P-CCNC: 19 U/L (ref 0–45)
BILIRUB SERPL-MCNC: 0.6 MG/DL
BUN SERPL-MCNC: 22.6 MG/DL (ref 8–23)
CALCIUM SERPL-MCNC: 10.6 MG/DL (ref 8.8–10.2)
CHLORIDE SERPL-SCNC: 101 MMOL/L (ref 98–107)
CHOLEST SERPL-MCNC: 168 MG/DL
CREAT SERPL-MCNC: 0.96 MG/DL (ref 0.51–0.95)
DEPRECATED HCO3 PLAS-SCNC: 26 MMOL/L (ref 22–29)
EGFRCR SERPLBLD CKD-EPI 2021: 60 ML/MIN/1.73M2
ERYTHROCYTE [DISTWIDTH] IN BLOOD BY AUTOMATED COUNT: 13.5 % (ref 10–15)
GLUCOSE SERPL-MCNC: 103 MG/DL (ref 70–99)
HCT VFR BLD AUTO: 41.1 % (ref 35–47)
HDLC SERPL-MCNC: 45 MG/DL
HGB BLD-MCNC: 13.4 G/DL (ref 11.7–15.7)
LDLC SERPL CALC-MCNC: 96 MG/DL
MCH RBC QN AUTO: 28.7 PG (ref 26.5–33)
MCHC RBC AUTO-ENTMCNC: 32.6 G/DL (ref 31.5–36.5)
MCV RBC AUTO: 88 FL (ref 78–100)
NONHDLC SERPL-MCNC: 123 MG/DL
PLATELET # BLD AUTO: 223 10E3/UL (ref 150–450)
POTASSIUM SERPL-SCNC: 4.2 MMOL/L (ref 3.4–5.3)
PROT SERPL-MCNC: 7.3 G/DL (ref 6.4–8.3)
RBC # BLD AUTO: 4.67 10E6/UL (ref 3.8–5.2)
SODIUM SERPL-SCNC: 138 MMOL/L (ref 136–145)
TRIGL SERPL-MCNC: 136 MG/DL
WBC # BLD AUTO: 8.4 10E3/UL (ref 4–11)

## 2023-09-07 PROCEDURE — 80061 LIPID PANEL: CPT | Performed by: FAMILY MEDICINE

## 2023-09-07 PROCEDURE — 80053 COMPREHEN METABOLIC PANEL: CPT | Performed by: FAMILY MEDICINE

## 2023-09-07 PROCEDURE — 82306 VITAMIN D 25 HYDROXY: CPT | Performed by: FAMILY MEDICINE

## 2023-09-07 PROCEDURE — 36415 COLL VENOUS BLD VENIPUNCTURE: CPT | Performed by: FAMILY MEDICINE

## 2023-09-07 PROCEDURE — 83036 HEMOGLOBIN GLYCOSYLATED A1C: CPT | Performed by: FAMILY MEDICINE

## 2023-09-07 PROCEDURE — G0439 PPPS, SUBSEQ VISIT: HCPCS | Performed by: FAMILY MEDICINE

## 2023-09-07 PROCEDURE — 99214 OFFICE O/P EST MOD 30 MIN: CPT | Mod: 25 | Performed by: FAMILY MEDICINE

## 2023-09-07 PROCEDURE — 85027 COMPLETE CBC AUTOMATED: CPT | Performed by: FAMILY MEDICINE

## 2023-09-07 RX ORDER — FLUOXETINE 10 MG/1
10 CAPSULE ORAL DAILY
Qty: 180 CAPSULE | Refills: 0
Start: 2023-09-07 | End: 2024-03-13

## 2023-09-07 ASSESSMENT — ENCOUNTER SYMPTOMS
FEVER: 0
DYSURIA: 0
DIZZINESS: 0
NERVOUS/ANXIOUS: 1
WEAKNESS: 0
PARESTHESIAS: 0
HEMATOCHEZIA: 0
HEMATURIA: 0
EYE PAIN: 0
NAUSEA: 0
JOINT SWELLING: 0
HEARTBURN: 1
DIARRHEA: 0
COUGH: 0
CHILLS: 0
SHORTNESS OF BREATH: 1
FREQUENCY: 0
PALPITATIONS: 0
MYALGIAS: 0
BREAST MASS: 0
HEADACHES: 0
ABDOMINAL PAIN: 0
ARTHRALGIAS: 1
SORE THROAT: 0
CONSTIPATION: 0

## 2023-09-07 ASSESSMENT — ACTIVITIES OF DAILY LIVING (ADL): CURRENT_FUNCTION: NO ASSISTANCE NEEDED

## 2023-09-07 NOTE — PROGRESS NOTES
"SUBJECTIVE:   Nicole is a 77 year old who presents for Preventive Visit.      9/7/2023    11:25 AM   Additional Questions   Roomed by Melanie SANTIAGO CMA     Are you in the first 12 months of your Medicare coverage?  No    Healthy Habits:     In general, how would you rate your overall health?  Good    Frequency of exercise:  None    Do you usually eat at least 4 servings of fruit and vegetables a day, include whole grains    & fiber and avoid regularly eating high fat or \"junk\" foods?  Yes    Taking medications regularly:  Yes    Medication side effects:  Not applicable    Ability to successfully perform activities of daily living:  No assistance needed    Home Safety:  No safety concerns identified    Hearing Impairment:  Need to ask people to speak up or repeat themselves    In the past 6 months, have you been bothered by leaking of urine? Yes    In general, how would you rate your overall mental or emotional health?  Good    Additional concerns today:  Yes        Have you ever done Advance Care Planning? (For example, a Health Directive, POLST, or a discussion with a medical provider or your loved ones about your wishes): Yes, patient states has an Advance Care Planning document and will bring a copy to the clinic.       Fall risk  Fallen 2 or more times in the past year?: No  Any fall with injury in the past year?: No  click delete button to remove this line now  Cognitive Screening   1) Repeat 3 items (Leader, Season, Table)    2) Clock draw: NORMAL  3) 3 item recall: Recalls 3 objects  Results: 3 items recalled: COGNITIVE IMPAIRMENT LESS LIKELY    Mini-CogTM Copyright RIO Salazar. Licensed by the author for use in [facility]; reprinted with permission (ovidio@.[facility]). All rights reserved.      Do you have sleep apnea, excessive snoring or daytime drowsiness? : no    Reviewed and updated as needed this visit by clinical staff   Tobacco  Allergies  Meds              Reviewed and updated as needed this " visit by Provider                 Social History     Tobacco Use    Smoking status: Former     Packs/day: 2.00     Years: 40.00     Pack years: 80.00     Types: Cigarettes     Quit date: 6/3/2007     Years since quittin.2     Passive exposure: Never    Smokeless tobacco: Never    Tobacco comments:     quit    Substance Use Topics    Alcohol use: No     Comment: occasional           2023    11:28 AM   Alcohol Use   Prescreen: >3 drinks/day or >7 drinks/week? Not Applicable         2023    11:27 AM   AUDIT - Alcohol Use Disorders Identification Test - Reproduced from the World Health Organization Audit 2001 (Second Edition)   1.  How often do you have a drink containing alcohol? Never   2.  How many drinks containing alcohol do you have on a typical day when you are drinking? 1 or 2   3.  How often do you have five or more drinks on one occasion? Never   4.  How often during the last year have you found that you were not able to stop drinking once you had started? Never   5.  How often during the last year have you failed to do what was normally expected of you because of drinking? Never   6.  How often during the last year have you needed a first drink in the morning to get yourself going after a heavy drinking session? Never   7.  How often during the last year have you had a feeling of guilt or remorse after drinking? Never   8.  How often during the last year have you been unable to remember what happened the night before because of your drinking? Never   9.  Have you or someone else been injured because of your drinking? No   10. Has a relative, friend, doctor or other health care worker been concerned about your drinking or suggested you cut down? No   TOTAL SCORE 0     Do you have a current opioid prescription? No  Do you use any other controlled substances or medications that are not prescribed by a provider? None          Wants to discuss arthritis.     Current providers sharing in care for  this patient include:   Patient Care Team:  Paula Weaver MD as PCP - General (Family Medicine)  Paula Weaver MD as Assigned PCP  Eduarda Gao PA-C as Physician Assistant (Urology)    The following health maintenance items are reviewed in Epic and correct as of today:  Health Maintenance   Topic Date Due    ZOSTER IMMUNIZATION (1 of 2) Never done    Pneumococcal Vaccine: 65+ Years (1 - PCV) Never done    COLORECTAL CANCER SCREENING  01/29/2019    DTAP/TDAP/TD IMMUNIZATION (2 - Td or Tdap) 06/02/2021    COVID-19 Vaccine (5 - Pfizer series) 10/17/2022    ANNUAL REVIEW OF HM ORDERS  06/23/2023    INFLUENZA VACCINE (1) Never done    MEDICARE ANNUAL WELLNESS VISIT  10/20/2023    PHQ-9  03/07/2024    FALL RISK ASSESSMENT  09/07/2024    LIPID  10/20/2027    ADVANCE CARE PLANNING  09/07/2028    DEXA  03/27/2038    HEPATITIS C SCREENING  Completed    DEPRESSION ACTION PLAN  Completed    IPV IMMUNIZATION  Aged Out    HPV IMMUNIZATION  Aged Out    MENINGITIS IMMUNIZATION  Aged Out    MAMMO SCREENING  Discontinued    LUNG CANCER SCREENING  Discontinued     Lab work is in process  Mammogram Screening: Mammogram Screening - Patient over age 75, has elected to discontinue screenings.      Pertinent mammograms are reviewed under the imaging tab.    Review of Systems   Constitutional:  Negative for chills and fever.   HENT:  Positive for hearing loss. Negative for congestion, ear pain and sore throat.    Eyes:  Negative for pain and visual disturbance.   Respiratory:  Positive for shortness of breath. Negative for cough.    Cardiovascular:  Negative for chest pain, palpitations and peripheral edema.   Gastrointestinal:  Positive for heartburn. Negative for abdominal pain, constipation, diarrhea, hematochezia and nausea.   Breasts:  Negative for tenderness, breast mass and discharge.   Genitourinary:  Negative for dysuria, frequency, genital sores, hematuria, pelvic pain, urgency, vaginal bleeding and  "vaginal discharge.   Musculoskeletal:  Positive for arthralgias. Negative for joint swelling and myalgias.   Skin:  Negative for rash.   Neurological:  Negative for dizziness, weakness, headaches and paresthesias.   Psychiatric/Behavioral:  Negative for mood changes. The patient is nervous/anxious.        OBJECTIVE:   /79 (BP Location: Left arm, Patient Position: Sitting, Cuff Size: Adult Regular)   Pulse 65   Temp 97.2  F (36.2  C) (Oral)   Resp 16   Ht 1.594 m (5' 2.75\")   Wt 75.8 kg (167 lb 3.2 oz)   SpO2 98%   BMI 29.85 kg/m   Estimated body mass index is 29.85 kg/m  as calculated from the following:    Height as of this encounter: 1.594 m (5' 2.75\").    Weight as of this encounter: 75.8 kg (167 lb 3.2 oz).  Physical Exam  GENERAL APPEARANCE: healthy, alert and no distress  EYES: Eyes grossly normal to inspection, PERRL and conjunctivae and sclerae normal  HENT: ose and mouth without ulcers or lesions, oral mucous membranes moist  NECK: no adenopathy, no asymmetry, masses, or scars and thyroid normal to palpation  RESP: lungs clear to auscultation - no rales, rhonchi or wheezes  CV: regular rate and rhythm, normal S1 S2, no S3 or S4, no murmur, click or rub, no peripheral edema and peripheral pulses strong  MS: no musculoskeletal defects are noted and gait is age appropriate without ataxia  SKIN: no suspicious lesions or rashes  NEURO: grossly normal mentation intact and speech normal  PSYCH: mentation appears normal and affect normal/bright    Diagnostic Test Results:  Labs reviewed in Epic    ASSESSMENT / PLAN:   Nika was seen today for annual visit and arthritis.    Diagnoses and all orders for this visit:    Encounter for Medicare annual wellness exam  Routine history and physical, updated in EMR.  Declined colonoscopy.-For mammogram and Pap smear.  Up-to-date with bone density scan.  Turn in a year for next annual physical.    Mild major depression (H)  On fluoxetine 10 mg daily.  " "Stable.    Essential hypertension  Cipro and hydrochlorothiazide.  She said ever since she started hydrochlorothiazide she does not have much nocturia.  -     Comprehensive metabolic panel (BMP + Alb, Alk Phos, ALT, AST, Total. Bili, TP); Future    Essential familial hypercholesterolemia  On atorvastatin.  -     Lipid panel; Future    Osteopenia after menopause  Not on bone building treatment.  Bone density scan was actually normal  -     Comprehensive metabolic panel (BMP + Alb, Alk Phos, ALT, AST, Total. Bili, TP); Future    Vitamin D deficiency  -     Vitamin D Deficiency; Future    History of tobacco abuse    Pain of left upper arm  Pain in joint involving multiple sites  Most likely OA. Recommended tylenol oral daily prn.   Discussed potential joint injections with ortho in future    Screening, anemia, deficiency, iron  -     CBC with platelets; Future    Other orders  -     REVIEW OF HEALTH MAINTENANCE PROTOCOL ORDERS  -     PRIMARY CARE FOLLOW-UP SCHEDULING; Future        Patient has been advised of split billing requirements and indicates understanding: Yes      COUNSELING:  Reviewed preventive health counseling, as reflected in patient instructions       Regular exercise       Healthy diet/nutrition      BMI:   Estimated body mass index is 29.85 kg/m  as calculated from the following:    Height as of this encounter: 1.594 m (5' 2.75\").    Weight as of this encounter: 75.8 kg (167 lb 3.2 oz).         She reports that she quit smoking about 16 years ago. Her smoking use included cigarettes. She has a 80.00 pack-year smoking history. She has never been exposed to tobacco smoke. She has never used smokeless tobacco.      Appropriate preventive services were discussed with this patient, including applicable screening as appropriate for cardiovascular disease, diabetes, osteopenia/osteoporosis, and glaucoma.  As appropriate for age/gender, discussed screening for colorectal cancer, prostate cancer, breast cancer, " "and cervical cancer. Checklist reviewing preventive services available has been given to the patient.    Reviewed patients plan of care and provided an AVS. The Basic Care Plan (routine screening as documented in Health Maintenance) for Nika meets the Care Plan requirement. This Care Plan has been established and reviewed with the Patient.          Paula Weaver MD  [facility]    Identified Health Risks:  I have reviewed Opioid Use Disorder and Substance Use Disorder risk factors and made any needed referrals. Answers submitted by the patient for this visit:  Patient Health Questionnaire (Submitted on 9/7/2023)  If you checked off any problems, how difficult have these problems made it for you to do your work, take care of things at home, or get along with other people?: Not difficult at all  PHQ9 TOTAL SCORE: 9  Annual Preventive Visit (Submitted on 9/7/2023)  Chief Complaint: Annual Exam:  In general, how would you rate your overall physical health?: good  Frequency of exercise:: None  Do you usually eat at least 4 servings of fruit and vegetables a day, include whole grains & fiber, and avoid regularly eating high fat or \"junk\" foods? : Yes  Taking medications regularly:: Yes  Medication side effects:: Not applicable  Activities of Daily Living: no assistance needed  Home safety: no safety concerns identified  Hearing Impairment:: need to ask people to speak up or repeat themselves  In the past 6 months, have you been bothered by leaking of urine?: Yes  abdominal pain: No  Blood in stool: No  Blood in urine: No  chest pain: No  chills: No  congestion: No  constipation: No  cough: No  diarrhea: No  dizziness: No  ear pain: No  eye pain: No  nervous/anxious: Yes  fever: No  frequency: No  genital sores: No  headaches: No  hearing loss: Yes  heartburn: Yes  arthralgias: Yes  joint swelling: No  peripheral edema: No  mood changes: No  myalgias: No  nausea: No  dysuria: " No  palpitations: No  Skin sensation changes: No  sore throat: No  urgency: No  rash: No  shortness of breath: Yes  visual disturbance: No  weakness: No  pelvic pain: No  vaginal bleeding: No  vaginal discharge: No  tenderness: No  breast mass: No  breast discharge: No  In general, how would you rate your overall mental or emotional health?: good  Additional concerns today:: Yes

## 2023-09-07 NOTE — PROGRESS NOTES
"She is at risk for lack of exercise and has been provided with information to increase physical activity for the benefit of her well-being.  The patient was provided with written information regarding signs of hearing loss.  Information on urinary incontinence and treatment options given to patient.  The patient's PHQ-9 score is consistent with mild depression. She was provided with information regarding depression and monitor for now. Stable. Answers submitted by the patient for this visit:  Patient Health Questionnaire (Submitted on 9/7/2023)  If you checked off any problems, how difficult have these problems made it for you to do your work, take care of things at home, or get along with other people?: Not difficult at all  PHQ9 TOTAL SCORE: 9  Annual Preventive Visit (Submitted on 9/7/2023)  Chief Complaint: Annual Exam:  In general, how would you rate your overall physical health?: good  Frequency of exercise:: None  Do you usually eat at least 4 servings of fruit and vegetables a day, include whole grains & fiber, and avoid regularly eating high fat or \"junk\" foods? : Yes  Taking medications regularly:: Yes  Medication side effects:: Not applicable  Activities of Daily Living: no assistance needed  Home safety: no safety concerns identified  Hearing Impairment:: need to ask people to speak up or repeat themselves  In the past 6 months, have you been bothered by leaking of urine?: Yes  abdominal pain: No  Blood in stool: No  Blood in urine: No  chest pain: No  chills: No  congestion: No  constipation: No  cough: No  diarrhea: No  dizziness: No  ear pain: No  eye pain: No  nervous/anxious: Yes  fever: No  frequency: No  genital sores: No  headaches: No  hearing loss: Yes  heartburn: Yes  arthralgias: Yes  joint swelling: No  peripheral edema: No  mood changes: No  myalgias: No  nausea: No  dysuria: No  palpitations: No  Skin sensation changes: No  sore throat: No  urgency: No  rash: No  shortness of breath: " Yes  visual disturbance: No  weakness: No  pelvic pain: No  vaginal bleeding: No  vaginal discharge: No  tenderness: No  breast mass: No  breast discharge: No  In general, how would you rate your overall mental or emotional health?: good  Additional concerns today:: Yes

## 2023-09-08 DIAGNOSIS — R73.09 ELEVATED GLUCOSE: Primary | ICD-10-CM

## 2023-09-08 LAB
DEPRECATED CALCIDIOL+CALCIFEROL SERPL-MC: 68 UG/L (ref 20–75)
HBA1C MFR BLD: 6.7 % (ref 0–5.6)

## 2023-09-11 ENCOUNTER — TELEPHONE (OUTPATIENT)
Dept: FAMILY MEDICINE | Facility: CLINIC | Age: 78
End: 2023-09-11
Payer: COMMERCIAL

## 2023-09-11 NOTE — LETTER
September 12, 2023      Nicole Navarrete  [address]   [place] [address]        Dear ,    We are writing to inform you of your test results.     A1c is 6.7. she now has diabetes. I recommend low carbs/low sugar diet to help prevent it from worsening. Come back in 3 months for office visit to recheck diabetes. I also recommend seeing diabetes nurse educator as well let me know if she agrees I will refer her to jorge Marlow   Vitamin D Deficiency   Result Value Ref Range    Vitamin D, Total (25-Hydroxy) 68 20 - 75 ug/L    Narrative    Season, race, dietary intake, and treatment affect the concentration of 25-hydroxy-Vitamin D. Values may decrease during winter months and increase during summer months. Values 20-29 ug/L may indicate Vitamin D insufficiency and values <20 ug/L may indicate Vitamin D deficiency.    Vitamin D determination is routinely performed by an immunoassay specific for 25 hydroxyvitamin D3.  If an individual is on vitamin D2(ergocalciferol) supplementation, please specify 25 OH vitamin D2 and D3 level determination by LCMSMS test VITD23.     Lipid panel   Result Value Ref Range    Cholesterol 168 <200 mg/dL    Triglycerides 136 <150 mg/dL    Direct Measure HDL 45 (L) >=50 mg/dL    LDL Cholesterol Calculated 96 <=100 mg/dL    Non HDL Cholesterol 123 <130 mg/dL    Narrative    Cholesterol  Desirable:  <200 mg/dL    Triglycerides  Normal:  Less than 150 mg/dL  Borderline High:  150-199 mg/dL  High:  200-499 mg/dL  Very High:  Greater than or equal to 500 mg/dL    Direct Measure HDL  Female:  Greater than or equal to 50 mg/dL   Male:  Greater than or equal to 40 mg/dL    LDL Cholesterol  Desirable:  <100mg/dL  Above Desirable:  100-129 mg/dL   Borderline High:  130-159 mg/dL   High:  160-189 mg/dL   Very High:  >= 190 mg/dL    Non HDL Cholesterol  Desirable:  130 mg/dL  Above Desirable:  130-159 mg/dL  Borderline High:  160-189 mg/dL  High:  190-219 mg/dL  Very  High:  Greater than or equal to 220 mg/dL   CBC with platelets   Result Value Ref Range    WBC Count 8.4 4.0 - 11.0 10e3/uL    RBC Count 4.67 3.80 - 5.20 10e6/uL    Hemoglobin 13.4 11.7 - 15.7 g/dL    Hematocrit 41.1 35.0 - 47.0 %    MCV 88 78 - 100 fL    MCH 28.7 26.5 - 33.0 pg    MCHC 32.6 31.5 - 36.5 g/dL    RDW 13.5 10.0 - 15.0 %    Platelet Count 223 150 - 450 10e3/uL   Comprehensive metabolic panel (BMP + Alb, Alk Phos, ALT, AST, Total. Bili, TP)   Result Value Ref Range    Sodium 138 136 - 145 mmol/L    Potassium 4.2 3.4 - 5.3 mmol/L    Chloride 101 98 - 107 mmol/L    Carbon Dioxide (CO2) 26 22 - 29 mmol/L    Anion Gap 11 7 - 15 mmol/L    Urea Nitrogen 22.6 8.0 - 23.0 mg/dL    Creatinine 0.96 (H) 0.51 - 0.95 mg/dL    Calcium 10.6 (H) 8.8 - 10.2 mg/dL    Glucose 103 (H) 70 - 99 mg/dL    Alkaline Phosphatase 77 35 - 104 U/L    AST 19 0 - 45 U/L      Comment:      Reference intervals for this test were updated on 6/12/2023 to more accurately reflect our healthy population. There may be differences in the flagging of prior results with similar values performed with this method. Interpretation of those prior results can be made in the context of the updated reference intervals.    ALT 21 0 - 50 U/L      Comment:      Reference intervals for this test were updated on 6/12/2023 to more accurately reflect our healthy population. There may be differences in the flagging of prior results with similar values performed with this method. Interpretation of those prior results can be made in the context of the updated reference intervals.      Protein Total 7.3 6.4 - 8.3 g/dL    Albumin 4.6 3.5 - 5.2 g/dL    Bilirubin Total 0.6 <=1.2 mg/dL    GFR Estimate 60 (L) >60 mL/min/1.73m2   Hemoglobin A1c   Result Value Ref Range    Hemoglobin A1C 6.7 (H) 0.0 - 5.6 %      Comment:      Normal <5.7%   Prediabetes 5.7-6.4%    Diabetes 6.5% or higher     Note: Adopted from ADA consensus guidelines.       If you have any questions or  concerns, please call the clinic at the number listed above.       Sincerely,

## 2023-09-11 NOTE — TELEPHONE ENCOUNTER
Called Nicole,    Patient said that she has been eating very poorly. She states she eats tons of sugar. She will reign this in over the next few months. She declines DE at this time.    I assisted her with setting up a follow up visit.    Damion Harris RN     [facility]      ----- Message from Paula Weaver MD sent at 9/9/2023  7:03 PM CDT -----  Please call pt: A1c is 6.7. she now has diabetes. I recommend low carbs/low sugar diet to help prevent it from worsening. Come back in 3 months for office visit to recheck diabetes. I also recommend seeing diabetes nurse educator as well let me know if she agrees I will refer her to jorge.

## 2023-09-12 ENCOUNTER — TELEPHONE (OUTPATIENT)
Dept: FAMILY MEDICINE | Facility: CLINIC | Age: 78
End: 2023-09-12
Payer: COMMERCIAL

## 2023-09-12 NOTE — TELEPHONE ENCOUNTER
Patient calling back in asking if she should be starting a medication for her diabetes.    Damion Harris RN     [facility]

## 2023-09-12 NOTE — TELEPHONE ENCOUNTER
I think she should change her diet, walk more, recheck in 3 months and if not better we can start medication then

## 2023-09-12 NOTE — TELEPHONE ENCOUNTER
General Call    Contacts         Type Contact Phone/Fax    09/12/2023 12:33 PM CDT Phone (Incoming) Nicole Navarrete (Self) [phone] ()          Reason for Call:  Lab results    What are your questions or concerns:  Pt was seen in clinic for AWV 97/23, she would like her lab results mailed to her as she does not use My Chart. Home address confirmed    Date of last appointment with provider: 9/7/23    Okay to leave a detailed message?: Yes at Home number on file [phone] (home)

## 2023-09-12 NOTE — TELEPHONE ENCOUNTER
Called patient and left message to call back to discuss notation below. Please relay result note to patient and assist with any recommended follow up scheduling. If there are further questions or concerns please transfer to nurse line or take a message for the provider.    Thank you,    Damion Harris RN     [facility]

## 2023-09-12 NOTE — TELEPHONE ENCOUNTER
Patient called back, Ielayed information/instructions from provider. Patient has no further questions at this time and will follow up as needed/instructed.    Damion Harris RN     [facility]

## 2023-09-21 NOTE — TELEPHONE ENCOUNTER
Patient called back to see if the results were in the mail. I told her the date we mailed this out and explained the mailing process to her so that she was aware this might take a little longer. She stated she understood and will call us early next week if they do not come by Friday evening tomorrow.

## 2023-10-02 NOTE — TELEPHONE ENCOUNTER
FYI - Status Update    Who is Calling: patient    Update: Incoming call from patient stating she didn't receive the result in the mail yet. Wants to know what is taking so long for us mail her result. Told patient that it was sent on 9/12. Also, patient said she is upset because she was charged by her insurance for AWV due to being to early. States she cannot afford to pay for the balance of $107. Please advise     Does caller want a call/response back: Yes     Okay to leave a detailed message?: Yes at Home number on file [phone] (home)

## 2023-10-03 NOTE — TELEPHONE ENCOUNTER
Called and informed patient that results letter has been mailed again.   Patient is very upset that she is being charged $137 for her AWV on 09/07/23. She states that she received a message stating that she was due for her AWV so she scheduled the appt. She states that she she was just doing what was asked of her and she can't afford $137.  Patient would like to discuss this matter with the manager so someone can be held accountable.  Patient has already spoke to our billing department.    Message forwarded to manager.

## 2023-10-05 NOTE — TELEPHONE ENCOUNTER
I spoke with the patient.  I will call her on Tuesday with status of her billing concern andif she received her lab results.

## 2023-10-20 DIAGNOSIS — I10 ESSENTIAL HYPERTENSION: ICD-10-CM

## 2023-10-20 RX ORDER — LISINOPRIL 40 MG/1
40 TABLET ORAL DAILY
Qty: 90 TABLET | Refills: 3 | Status: SHIPPED | OUTPATIENT
Start: 2023-10-20 | End: 2024-09-12

## 2023-11-14 ENCOUNTER — PATIENT OUTREACH (OUTPATIENT)
Dept: FAMILY MEDICINE | Facility: CLINIC | Age: 78
End: 2023-11-14
Payer: COMMERCIAL

## 2023-11-14 NOTE — TELEPHONE ENCOUNTER
Patient Quality Outreach    Patient is due for the following:   Depression  -  PHQ-9 needed    Next Steps:   Patient has upcoming appointment, these items will be addressed at that time.    Type of outreach:    Chart review performed, no outreach needed.      Questions for provider review:    None           Melanie Long MA

## 2024-01-02 ENCOUNTER — TRANSFERRED RECORDS (OUTPATIENT)
Dept: MULTI SPECIALTY CLINIC | Facility: CLINIC | Age: 79
End: 2024-01-02

## 2024-01-02 LAB — RETINOPATHY: NORMAL

## 2024-01-04 DIAGNOSIS — I10 ESSENTIAL HYPERTENSION: ICD-10-CM

## 2024-01-04 RX ORDER — HYDROCHLOROTHIAZIDE 25 MG/1
TABLET ORAL
Qty: 90 TABLET | Refills: 1 | OUTPATIENT
Start: 2024-01-04

## 2024-01-23 DIAGNOSIS — I10 ESSENTIAL HYPERTENSION: ICD-10-CM

## 2024-01-26 RX ORDER — HYDROCHLOROTHIAZIDE 25 MG/1
TABLET ORAL
Qty: 90 TABLET | Refills: 1 | Status: SHIPPED | OUTPATIENT
Start: 2024-01-26 | End: 2024-07-16

## 2024-02-07 DIAGNOSIS — E78.01 ESSENTIAL FAMILIAL HYPERCHOLESTEROLEMIA: ICD-10-CM

## 2024-02-07 RX ORDER — ATORVASTATIN CALCIUM 40 MG/1
40 TABLET, FILM COATED ORAL AT BEDTIME
Qty: 90 TABLET | Refills: 2 | Status: SHIPPED | OUTPATIENT
Start: 2024-02-07 | End: 2024-09-12

## 2024-02-08 ENCOUNTER — OFFICE VISIT (OUTPATIENT)
Dept: FAMILY MEDICINE | Facility: CLINIC | Age: 79
End: 2024-02-08
Payer: COMMERCIAL

## 2024-02-08 VITALS
TEMPERATURE: 97.1 F | WEIGHT: 166.6 LBS | HEART RATE: 62 BPM | HEIGHT: 63 IN | SYSTOLIC BLOOD PRESSURE: 136 MMHG | RESPIRATION RATE: 18 BRPM | OXYGEN SATURATION: 97 % | BODY MASS INDEX: 29.52 KG/M2 | DIASTOLIC BLOOD PRESSURE: 76 MMHG

## 2024-02-08 DIAGNOSIS — R42 DIZZINESS: ICD-10-CM

## 2024-02-08 DIAGNOSIS — E78.01 ESSENTIAL FAMILIAL HYPERCHOLESTEROLEMIA: ICD-10-CM

## 2024-02-08 DIAGNOSIS — I10 ESSENTIAL HYPERTENSION: ICD-10-CM

## 2024-02-08 DIAGNOSIS — E11.65 TYPE 2 DIABETES MELLITUS WITH HYPERGLYCEMIA, WITHOUT LONG-TERM CURRENT USE OF INSULIN (H): Primary | ICD-10-CM

## 2024-02-08 DIAGNOSIS — M25.472 LEFT ANKLE SWELLING: ICD-10-CM

## 2024-02-08 DIAGNOSIS — F33.0 MILD EPISODE OF RECURRENT MAJOR DEPRESSIVE DISORDER (H): ICD-10-CM

## 2024-02-08 LAB
ERYTHROCYTE [DISTWIDTH] IN BLOOD BY AUTOMATED COUNT: 13.6 % (ref 10–15)
HBA1C MFR BLD: 6.6 % (ref 0–5.6)
HCT VFR BLD AUTO: 39.1 % (ref 35–47)
HGB BLD-MCNC: 12.9 G/DL (ref 11.7–15.7)
HOLD SPECIMEN: NORMAL
MCH RBC QN AUTO: 29.2 PG (ref 26.5–33)
MCHC RBC AUTO-ENTMCNC: 33 G/DL (ref 31.5–36.5)
MCV RBC AUTO: 89 FL (ref 78–100)
PLATELET # BLD AUTO: 208 10E3/UL (ref 150–450)
RBC # BLD AUTO: 4.42 10E6/UL (ref 3.8–5.2)
WBC # BLD AUTO: 8.2 10E3/UL (ref 4–11)

## 2024-02-08 PROCEDURE — 85027 COMPLETE CBC AUTOMATED: CPT | Performed by: FAMILY MEDICINE

## 2024-02-08 PROCEDURE — 80053 COMPREHEN METABOLIC PANEL: CPT | Performed by: FAMILY MEDICINE

## 2024-02-08 PROCEDURE — 36415 COLL VENOUS BLD VENIPUNCTURE: CPT | Performed by: FAMILY MEDICINE

## 2024-02-08 PROCEDURE — 99214 OFFICE O/P EST MOD 30 MIN: CPT | Performed by: FAMILY MEDICINE

## 2024-02-08 PROCEDURE — 84443 ASSAY THYROID STIM HORMONE: CPT | Performed by: FAMILY MEDICINE

## 2024-02-08 PROCEDURE — 83036 HEMOGLOBIN GLYCOSYLATED A1C: CPT | Performed by: FAMILY MEDICINE

## 2024-02-08 RX ORDER — RESPIRATORY SYNCYTIAL VIRUS VACCINE 120MCG/0.5
0.5 KIT INTRAMUSCULAR ONCE
Qty: 1 EACH | Refills: 0 | Status: CANCELLED | OUTPATIENT
Start: 2024-02-08 | End: 2024-02-08

## 2024-02-08 ASSESSMENT — ANXIETY QUESTIONNAIRES
IF YOU CHECKED OFF ANY PROBLEMS ON THIS QUESTIONNAIRE, HOW DIFFICULT HAVE THESE PROBLEMS MADE IT FOR YOU TO DO YOUR WORK, TAKE CARE OF THINGS AT HOME, OR GET ALONG WITH OTHER PEOPLE: SOMEWHAT DIFFICULT
4. TROUBLE RELAXING: NOT AT ALL
5. BEING SO RESTLESS THAT IT IS HARD TO SIT STILL: NOT AT ALL
7. FEELING AFRAID AS IF SOMETHING AWFUL MIGHT HAPPEN: NOT AT ALL
GAD7 TOTAL SCORE: 3
6. BECOMING EASILY ANNOYED OR IRRITABLE: SEVERAL DAYS
3. WORRYING TOO MUCH ABOUT DIFFERENT THINGS: SEVERAL DAYS
8. IF YOU CHECKED OFF ANY PROBLEMS, HOW DIFFICULT HAVE THESE MADE IT FOR YOU TO DO YOUR WORK, TAKE CARE OF THINGS AT HOME, OR GET ALONG WITH OTHER PEOPLE?: SOMEWHAT DIFFICULT
7. FEELING AFRAID AS IF SOMETHING AWFUL MIGHT HAPPEN: NOT AT ALL
2. NOT BEING ABLE TO STOP OR CONTROL WORRYING: NOT AT ALL
1. FEELING NERVOUS, ANXIOUS, OR ON EDGE: SEVERAL DAYS
GAD7 TOTAL SCORE: 3
GAD7 TOTAL SCORE: 3

## 2024-02-08 ASSESSMENT — PATIENT HEALTH QUESTIONNAIRE - PHQ9
10. IF YOU CHECKED OFF ANY PROBLEMS, HOW DIFFICULT HAVE THESE PROBLEMS MADE IT FOR YOU TO DO YOUR WORK, TAKE CARE OF THINGS AT HOME, OR GET ALONG WITH OTHER PEOPLE: SOMEWHAT DIFFICULT
SUM OF ALL RESPONSES TO PHQ QUESTIONS 1-9: 12
SUM OF ALL RESPONSES TO PHQ QUESTIONS 1-9: 12

## 2024-02-08 NOTE — PROGRESS NOTES
Assessment & Plan     Type 2 diabetes mellitus with hyperglycemia, without long-term current use of insulin (H)  A1c 6.6 today.  Relatively stable compared to A1c of 6.7 about 6 months ago.  Discussed diet and exercise.  No need for medication yet at this point.  Follow-up in 6 months.  Patient agrees with plan.  - Hemoglobin A1c  - Hemoglobin A1c  - Comprehensive metabolic panel (BMP + Alb, Alk Phos, ALT, AST, Total. 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 Ongoing symptoms well controlled with fluoxetine  - TSH with free T4 reflex    Essential hypertension  On hydrochlorothiazide and lisinopril.  Well-controlled.    Essential familial hypercholesterolemia  On statin.  Tolerating well    Left ankle swelling  Not a new issue.  Chronic left ankle swelling.  History of ankle replacement surgery.    Dizziness  New symptoms.  Occurs in the middle of the night when she gets up to go urinate.  Transient.  Discussed potential orthostatic hypotension.  Lab work today monitor for now.  If worsening return for further workup.  Patient agreed with plan.  - CBC with platelets  - TSH with free T4 reflex        Alycia Rivera is a 78 year old, presenting for the following health issues:  Diabetes (DM check, no concerns)        2/8/2024    12:22 PM   Additional Questions   Roomed by Melanie SANTIAGO CMA     History of Present Illness       Mental Health Follow-up:  Patient presents to follow-up on Depression & Anxiety.Patient's depression since last visit has been:  Better  The patient is not having other symptoms associated with depression.  Patient's anxiety since last visit has been:  Better  The patient is not having other symptoms associated with anxiety.  Any significant life events: health concerns  Patient is not feeling anxious or having panic attacks.  Patient has no concerns about alcohol or drug use.    Diabetes:   She presents for follow up of  "diabetes.    She is not checking blood glucose.        She is concerned about other.    She is not experiencing numbness or burning in feet, excessive thirst, blurry vision, weight changes or redness, sores or blisters on feet.           Hyperlipidemia:  She presents for follow up of hyperlipidemia.   She is taking medication to lower cholesterol. She is not having myalgia or other side effects to statin medications.    Hypertension: She presents for follow up of hypertension.  She does not check blood pressure  regularly outside of the clinic. Outpatient blood pressures have not been over 140/90. She does not follow a low salt diet.     She eats 2-3 servings of fruits and vegetables daily.She consumes 1 sweetened beverage(s) daily.She exercises with enough effort to increase her heart rate 9 or less minutes per day.    She is taking medications regularly.           Chief Complaint   Patient presents with    Diabetes     DM check, no concerns     Has been trying to eat better.      Review of Systems  Constitutional, HEENT, cardiovascular, pulmonary, gi and gu systems are negative, except as otherwise noted.      Objective    /76 (BP Location: Left arm, Patient Position: Sitting, Cuff Size: Adult Regular)   Pulse 62   Temp 97.1  F (36.2  C) (Oral)   Resp 18   Ht 1.594 m (5' 2.75\")   Wt 75.6 kg (166 lb 9.6 oz)   SpO2 97%   BMI 29.75 kg/m    Body mass index is 29.75 kg/m .  Physical Exam   GENERAL: alert and no distress  HEENT: Hard of hearing  NECK: no adenopathy, no asymmetry, masses, or scars  RESP: lungs clear to auscultation - no rales, rhonchi or wheezes  CV: regular rate and rhythm, normal S1 S2, no S3 or S4, no murmur, click or rub, no peripheral edema  MS: no gross musculoskeletal defects noted, left ankle swelling - chronic.   NEURO: Normal strength and tone, mentation intact and speech normal  PSYCH: mentation appears normal, affect normal/bright  Diabetic foot exam: normal DP and PT pulses, no " trophic changes or ulcerative lesions, normal sensory exam, and dry cracking heels    Results for orders placed or performed in visit on 02/08/24 (from the past 24 hour(s))   Hemoglobin A1c   Result Value Ref Range    Hemoglobin A1C 6.6 (H) 0.0 - 5.6 %   Extra Tube    Narrative    The following orders were created for panel order Extra Tube.  Procedure                               Abnormality         Status                     ---------                               -----------         ------                     Extra Green Top (Lithium...[[id number]]                      In process                   Please view results for these tests on the individual orders.           Signed Electronically by: Paula Weaver MD

## 2024-02-09 LAB
ALBUMIN SERPL BCG-MCNC: 4.4 G/DL (ref 3.5–5.2)
ALP SERPL-CCNC: 64 U/L (ref 40–150)
ALT SERPL W P-5'-P-CCNC: 24 U/L (ref 0–50)
ANION GAP SERPL CALCULATED.3IONS-SCNC: 10 MMOL/L (ref 7–15)
AST SERPL W P-5'-P-CCNC: 23 U/L (ref 0–45)
BILIRUB SERPL-MCNC: 0.5 MG/DL
BUN SERPL-MCNC: 18.6 MG/DL (ref 8–23)
CALCIUM SERPL-MCNC: 10.7 MG/DL (ref 8.8–10.2)
CHLORIDE SERPL-SCNC: 101 MMOL/L (ref 98–107)
CREAT SERPL-MCNC: 1 MG/DL (ref 0.51–0.95)
DEPRECATED HCO3 PLAS-SCNC: 28 MMOL/L (ref 22–29)
EGFRCR SERPLBLD CKD-EPI 2021: 57 ML/MIN/1.73M2
GLUCOSE SERPL-MCNC: 103 MG/DL (ref 70–99)
POTASSIUM SERPL-SCNC: 3.9 MMOL/L (ref 3.4–5.3)
PROT SERPL-MCNC: 7.1 G/DL (ref 6.4–8.3)
SODIUM SERPL-SCNC: 139 MMOL/L (ref 135–145)
TSH SERPL DL<=0.005 MIU/L-ACNC: 2.16 UIU/ML (ref 0.3–4.2)

## 2024-02-09 NOTE — TELEPHONE ENCOUNTER
Medication Question or Clarification  Who is calling: Patient  What medication are you calling about? (include dose and sig) Fluoxetine 10 mg, one daily  Who prescribed the medication?: Paula Weaver MD   What is your question/concern?: She tired to increase fluoxetine to 20 mg but it makes her tired.  Her anxiety and depression are increased.  She would like a different medication.      Patient was encouraged to make an appointment which she agreed to.  Patient was transferred to scheduling.  Pharmacy: n/a  Okay to leave a detailed message?: No  Site CMT - Please call the pharmacy to obtain any additional needed information.   no

## 2024-03-13 DIAGNOSIS — F32.0 MILD MAJOR DEPRESSION (H): ICD-10-CM

## 2024-03-14 RX ORDER — FLUOXETINE 10 MG/1
10 CAPSULE ORAL DAILY
Qty: 180 CAPSULE | Refills: 1 | Status: SHIPPED | OUTPATIENT
Start: 2024-03-14 | End: 2024-09-12

## 2024-04-11 ENCOUNTER — TRANSFERRED RECORDS (OUTPATIENT)
Dept: MULTI SPECIALTY CLINIC | Facility: CLINIC | Age: 79
End: 2024-04-11

## 2024-04-11 LAB — RETINOPATHY: NORMAL

## 2024-06-13 ENCOUNTER — OFFICE VISIT (OUTPATIENT)
Dept: FAMILY MEDICINE | Facility: CLINIC | Age: 79
End: 2024-06-13
Payer: COMMERCIAL

## 2024-06-13 VITALS
WEIGHT: 167 LBS | HEIGHT: 63 IN | HEART RATE: 60 BPM | TEMPERATURE: 97.4 F | RESPIRATION RATE: 16 BRPM | OXYGEN SATURATION: 95 % | BODY MASS INDEX: 29.59 KG/M2 | SYSTOLIC BLOOD PRESSURE: 116 MMHG | DIASTOLIC BLOOD PRESSURE: 69 MMHG

## 2024-06-13 DIAGNOSIS — R06.83 SNORING: ICD-10-CM

## 2024-06-13 DIAGNOSIS — Z82.49 FAMILY HISTORY OF ISCHEMIC HEART DISEASE: ICD-10-CM

## 2024-06-13 DIAGNOSIS — H57.9 ITCHY EYES: ICD-10-CM

## 2024-06-13 DIAGNOSIS — R06.02 SHORTNESS OF BREATH: Primary | ICD-10-CM

## 2024-06-13 DIAGNOSIS — E11.65 TYPE 2 DIABETES MELLITUS WITH HYPERGLYCEMIA, WITHOUT LONG-TERM CURRENT USE OF INSULIN (H): ICD-10-CM

## 2024-06-13 DIAGNOSIS — L29.89 ITCHY NOSE: ICD-10-CM

## 2024-06-13 DIAGNOSIS — Z87.891 PERSONAL HISTORY OF TOBACCO USE, PRESENTING HAZARDS TO HEALTH: ICD-10-CM

## 2024-06-13 DIAGNOSIS — R06.7 SNEEZING: ICD-10-CM

## 2024-06-13 DIAGNOSIS — R05.3 CHRONIC COUGH: ICD-10-CM

## 2024-06-13 LAB
ATRIAL RATE - MUSE: 61 BPM
BASOPHILS # BLD AUTO: 0 10E3/UL (ref 0–0.2)
BASOPHILS NFR BLD AUTO: 1 %
DIASTOLIC BLOOD PRESSURE - MUSE: NORMAL MMHG
EOSINOPHIL # BLD AUTO: 0.2 10E3/UL (ref 0–0.7)
EOSINOPHIL NFR BLD AUTO: 2 %
ERYTHROCYTE [DISTWIDTH] IN BLOOD BY AUTOMATED COUNT: 13.6 % (ref 10–15)
HBA1C MFR BLD: 6.8 % (ref 0–5.6)
HCT VFR BLD AUTO: 39.9 % (ref 35–47)
HGB BLD-MCNC: 13.3 G/DL (ref 11.7–15.7)
IMM GRANULOCYTES # BLD: 0 10E3/UL
IMM GRANULOCYTES NFR BLD: 0 %
INTERPRETATION ECG - MUSE: NORMAL
LYMPHOCYTES # BLD AUTO: 1.3 10E3/UL (ref 0.8–5.3)
LYMPHOCYTES NFR BLD AUTO: 17 %
MCH RBC QN AUTO: 29.2 PG (ref 26.5–33)
MCHC RBC AUTO-ENTMCNC: 33.3 G/DL (ref 31.5–36.5)
MCV RBC AUTO: 88 FL (ref 78–100)
MONOCYTES # BLD AUTO: 0.6 10E3/UL (ref 0–1.3)
MONOCYTES NFR BLD AUTO: 8 %
NEUTROPHILS # BLD AUTO: 5.4 10E3/UL (ref 1.6–8.3)
NEUTROPHILS NFR BLD AUTO: 72 %
P AXIS - MUSE: 6 DEGREES
PLATELET # BLD AUTO: 195 10E3/UL (ref 150–450)
PR INTERVAL - MUSE: 132 MS
QRS DURATION - MUSE: 78 MS
QT - MUSE: 458 MS
QTC - MUSE: 461 MS
R AXIS - MUSE: -27 DEGREES
RBC # BLD AUTO: 4.56 10E6/UL (ref 3.8–5.2)
SYSTOLIC BLOOD PRESSURE - MUSE: NORMAL MMHG
T AXIS - MUSE: 26 DEGREES
VENTRICULAR RATE- MUSE: 61 BPM
WBC # BLD AUTO: 7.5 10E3/UL (ref 4–11)

## 2024-06-13 PROCEDURE — 82043 UR ALBUMIN QUANTITATIVE: CPT | Performed by: FAMILY MEDICINE

## 2024-06-13 PROCEDURE — 93005 ELECTROCARDIOGRAM TRACING: CPT | Performed by: FAMILY MEDICINE

## 2024-06-13 PROCEDURE — 85025 COMPLETE CBC W/AUTO DIFF WBC: CPT | Performed by: FAMILY MEDICINE

## 2024-06-13 PROCEDURE — 36415 COLL VENOUS BLD VENIPUNCTURE: CPT | Performed by: FAMILY MEDICINE

## 2024-06-13 PROCEDURE — 93010 ELECTROCARDIOGRAM REPORT: CPT | Performed by: INTERNAL MEDICINE

## 2024-06-13 PROCEDURE — 99214 OFFICE O/P EST MOD 30 MIN: CPT | Performed by: FAMILY MEDICINE

## 2024-06-13 PROCEDURE — G2211 COMPLEX E/M VISIT ADD ON: HCPCS | Performed by: FAMILY MEDICINE

## 2024-06-13 PROCEDURE — 80053 COMPREHEN METABOLIC PANEL: CPT | Performed by: FAMILY MEDICINE

## 2024-06-13 PROCEDURE — 83036 HEMOGLOBIN GLYCOSYLATED A1C: CPT | Performed by: FAMILY MEDICINE

## 2024-06-13 PROCEDURE — 82570 ASSAY OF URINE CREATININE: CPT | Performed by: FAMILY MEDICINE

## 2024-06-13 RX ORDER — RESPIRATORY SYNCYTIAL VIRUS VACCINE 120MCG/0.5
0.5 KIT INTRAMUSCULAR ONCE
Qty: 1 EACH | Refills: 0 | Status: CANCELLED | OUTPATIENT
Start: 2024-06-13 | End: 2024-06-13

## 2024-06-13 ASSESSMENT — PATIENT HEALTH QUESTIONNAIRE - PHQ9
10. IF YOU CHECKED OFF ANY PROBLEMS, HOW DIFFICULT HAVE THESE PROBLEMS MADE IT FOR YOU TO DO YOUR WORK, TAKE CARE OF THINGS AT HOME, OR GET ALONG WITH OTHER PEOPLE: NOT DIFFICULT AT ALL
SUM OF ALL RESPONSES TO PHQ QUESTIONS 1-9: 9
SUM OF ALL RESPONSES TO PHQ QUESTIONS 1-9: 9

## 2024-06-13 ASSESSMENT — ENCOUNTER SYMPTOMS: FATIGUE: 1

## 2024-06-13 NOTE — PROGRESS NOTES
Assessment & Plan     Shortness of breath  6 months of gradual worsening. Oxygen decreases to 86% with walking and improves to mid 90s percent with rest.  No chest pain.  EKG obtained and personally reviewed and unremarkable, no ischemia.  Will obtain lab work today to assess any other metabolic reason for shortness of breath.  Will go ahead and obtain echocardiogram and CT scan of her chest.  She does have a history of nicotine dependence but quit many years ago.  I offered albuterol inhaler for now but she declined.  She is taking daily antihistamine to help with allergies but obviously this has not improved her shortness of breath.  Neck step of evaluation depending on echocardiogram and CT scan of her chest.  Low threshold to refer to cardiology as there is a family history of ischemic heart disease.  - CBC with platelets and differential  - Comprehensive metabolic panel (BMP + Alb, Alk Phos, ALT, AST, Total. Bili, TP)  - EKG 12-lead, tracing only  - CT Chest w/o Contrast  - Echocardiogram Complete  - CBC with platelets and differential  - Comprehensive metabolic panel (BMP + Alb, Alk Phos, ALT, AST, Total. Bili, TP)    Chronic cough  See above   - CT Chest w/o Contrast  - Echocardiogram Complete    Sneezing  Itchy eyes  Itchy nose  On antihistamine daily otc.  Patient declined albuterol inhaler.    Snoring  Monitor for now.  Low threshold to send to sleep medicine.    Family history of ischemic heart disease  Twin sisters have had open heart surgery and they keep smoking per patient's report.    Personal history of tobacco use, presenting hazards to health  - CT Chest w/o Contrast    Type 2 diabetes mellitus with hyperglycemia, without long-term current use of insulin (H)  Lab today.   - Albumin Random Urine Quantitative with Creat Ratio  - HEMOGLOBIN A1C  - Albumin Random Urine Quantitative with Creat Ratio  - HEMOGLOBIN A1C            BMI  Estimated body mass index is 29.82 kg/m  as calculated from the  "following:    Height as of this encounter: 1.594 m (5' 2.75\").    Weight as of this encounter: 75.8 kg (167 lb).       The longitudinal plan of care for the diagnosis(es)/condition(s) as documented were addressed during this visit. Due to the added complexity in care, I will continue to support Nicole in the subsequent management and with ongoing continuity of care.      Subjective   Nicole is a 78 year old, presenting for the following health issues:  Fatigue (SOB and fatigue/SOB with little effort. )        6/13/2024    12:18 PM   Additional Questions   Roomed by Melanie SANTIAGO CMA     Via the Health Maintenance questionnaire, the patient has reported the following services have been completed -Eye Exam: [place] 2024-04-11, this information has been sent to the abstraction team.  History of Present Illness       Reason for visit:  Shortness of breath  Symptom onset:  3-4 weeks ago    She eats 0-1 servings of fruits and vegetables daily.She consumes 0 sweetened beverage(s) daily.She exercises with enough effort to increase her heart rate 9 or less minutes per day.  She exercises with enough effort to increase her heart rate 3 or less days per week. She is missing 1 dose(s) of medications per week.           Chief Complaint   Patient presents with    Fatigue     SOB and fatigue  SOB with little effort.      She reports it's been going on for 6 months. No chest pain. She is afraid to do anything as she feels that she \"has to fight for air\" when she exerts herself.       Review of Systems  Constitutional, HEENT, cardiovascular, pulmonary, gi and gu systems are negative, except as otherwise noted.      Objective    /69 (BP Location: Left arm, Patient Position: Sitting, Cuff Size: Adult Regular)   Pulse 60   Temp 97.4  F (36.3  C) (Oral)   Resp 16   Ht 1.594 m (5' 2.75\")   Wt 75.8 kg (167 lb)   SpO2 97%   BMI 29.82 kg/m    Body mass index is 29.82 kg/m .  Physical Exam   GENERAL: alert and no distress  EYES: " Eyes grossly normal to inspection, PERRL and conjunctivae and sclerae normal  NECK: no adenopathy, no asymmetry, masses, or scars  RESP: lungs clear to auscultation - no rales, rhonchi or wheezes  CV: regular rate and rhythm, normal S1 S2, no S3 or S4, no murmur, click or rub, no peripheral edema  ABDOMEN: soft, nontender, no hepatosplenomegaly, no masses and bowel sounds normal  MS: no gross musculoskeletal defects noted, no edema  NEURO: Normal strength and tone, mentation intact and speech normal  PSYCH: mentation appears normal, affect normal/bright    Office Visit on 02/08/2024   Component Date Value Ref Range Status    Hemoglobin A1C 02/08/2024 6.6 (H)  0.0 - 5.6 % Final    Normal <5.7%   Prediabetes 5.7-6.4%    Diabetes 6.5% or higher     Note: Adopted from ADA consensus guidelines.    Hold Specimen 02/08/2024 JIC   Final    Sodium 02/08/2024 139  135 - 145 mmol/L Final    Reference intervals for this test were updated on 09/26/2023 to more accurately reflect our healthy population. There may be differences in the flagging of prior results with similar values performed with this method. Interpretation of those prior results can be made in the context of the updated reference intervals.     Potassium 02/08/2024 3.9  3.4 - 5.3 mmol/L Final    Carbon Dioxide (CO2) 02/08/2024 28  22 - 29 mmol/L Final    Anion Gap 02/08/2024 10  7 - 15 mmol/L Final    Urea Nitrogen 02/08/2024 18.6  8.0 - 23.0 mg/dL Final    Creatinine 02/08/2024 1.00 (H)  0.51 - 0.95 mg/dL Final    GFR Estimate 02/08/2024 57 (L)  >60 mL/min/1.73m2 Final    Calcium 02/08/2024 10.7 (H)  8.8 - 10.2 mg/dL Final    Chloride 02/08/2024 101  98 - 107 mmol/L Final    Glucose 02/08/2024 103 (H)  70 - 99 mg/dL Final    Alkaline Phosphatase 02/08/2024 64  40 - 150 U/L Final    Reference intervals for this test were updated on 11/14/2023 to more accurately reflect our healthy population. There may be differences in the flagging of prior results with similar  values performed with this method. Interpretation of those prior results can be made in the context of the updated reference intervals.    AST 02/08/2024 23  0 - 45 U/L Final    Reference intervals for this test were updated on 6/12/2023 to more accurately reflect our healthy population. There may be differences in the flagging of prior results with similar values performed with this method. Interpretation of those prior results can be made in the context of the updated reference intervals.    ALT 02/08/2024 24  0 - 50 U/L Final    Reference intervals for this test were updated on 6/12/2023 to more accurately reflect our healthy population. There may be differences in the flagging of prior results with similar values performed with this method. Interpretation of those prior results can be made in the context of the updated reference intervals.      Protein Total 02/08/2024 7.1  6.4 - 8.3 g/dL Final    Albumin 02/08/2024 4.4  3.5 - 5.2 g/dL Final    Bilirubin Total 02/08/2024 0.5  <=1.2 mg/dL Final    WBC Count 02/08/2024 8.2  4.0 - 11.0 10e3/uL Final    RBC Count 02/08/2024 4.42  3.80 - 5.20 10e6/uL Final    Hemoglobin 02/08/2024 12.9  11.7 - 15.7 g/dL Final    Hematocrit 02/08/2024 39.1  35.0 - 47.0 % Final    MCV 02/08/2024 89  78 - 100 fL Final    MCH 02/08/2024 29.2  26.5 - 33.0 pg Final    MCHC 02/08/2024 33.0  31.5 - 36.5 g/dL Final    RDW 02/08/2024 13.6  10.0 - 15.0 % Final    Platelet Count 02/08/2024 208  150 - 450 10e3/uL Final    TSH 02/08/2024 2.16  0.30 - 4.20 uIU/mL Final           Signed Electronically by: Paula Weaver MD

## 2024-06-14 LAB
ALBUMIN SERPL BCG-MCNC: 4.5 G/DL (ref 3.5–5.2)
ALP SERPL-CCNC: 80 U/L (ref 40–150)
ALT SERPL W P-5'-P-CCNC: 20 U/L (ref 0–50)
ANION GAP SERPL CALCULATED.3IONS-SCNC: 11 MMOL/L (ref 7–15)
AST SERPL W P-5'-P-CCNC: 22 U/L (ref 0–45)
BILIRUB SERPL-MCNC: 0.6 MG/DL
BUN SERPL-MCNC: 22.9 MG/DL (ref 8–23)
CALCIUM SERPL-MCNC: 10.6 MG/DL (ref 8.8–10.2)
CHLORIDE SERPL-SCNC: 103 MMOL/L (ref 98–107)
CREAT SERPL-MCNC: 1.07 MG/DL (ref 0.51–0.95)
CREAT UR-MCNC: 78 MG/DL
DEPRECATED HCO3 PLAS-SCNC: 26 MMOL/L (ref 22–29)
EGFRCR SERPLBLD CKD-EPI 2021: 53 ML/MIN/1.73M2
GLUCOSE SERPL-MCNC: 102 MG/DL (ref 70–99)
MICROALBUMIN UR-MCNC: <12 MG/L
MICROALBUMIN/CREAT UR: NORMAL MG/G{CREAT}
POTASSIUM SERPL-SCNC: 4.1 MMOL/L (ref 3.4–5.3)
PROT SERPL-MCNC: 7.3 G/DL (ref 6.4–8.3)
SODIUM SERPL-SCNC: 140 MMOL/L (ref 135–145)

## 2024-06-19 DIAGNOSIS — R06.02 SHORTNESS OF BREATH: Primary | ICD-10-CM

## 2024-06-19 DIAGNOSIS — I25.10 CORONARY ARTERY CALCIFICATION OF NATIVE ARTERY: ICD-10-CM

## 2024-06-19 DIAGNOSIS — Z82.49 FAMILY HISTORY OF ISCHEMIC HEART DISEASE: ICD-10-CM

## 2024-06-19 DIAGNOSIS — I25.84 CORONARY ARTERY CALCIFICATION OF NATIVE ARTERY: ICD-10-CM

## 2024-06-19 DIAGNOSIS — Z87.891 PERSONAL HISTORY OF TOBACCO USE, PRESENTING HAZARDS TO HEALTH: ICD-10-CM

## 2024-06-20 ENCOUNTER — HOSPITAL ENCOUNTER (OUTPATIENT)
Dept: CT IMAGING | Facility: HOSPITAL | Age: 79
Discharge: HOME | End: 2024-06-20
Attending: FAMILY MEDICINE | Admitting: FAMILY MEDICINE
Payer: COMMERCIAL

## 2024-06-20 DIAGNOSIS — R06.02 SHORTNESS OF BREATH: ICD-10-CM

## 2024-06-20 DIAGNOSIS — R05.3 CHRONIC COUGH: ICD-10-CM

## 2024-06-20 DIAGNOSIS — Z87.891 PERSONAL HISTORY OF TOBACCO USE, PRESENTING HAZARDS TO HEALTH: ICD-10-CM

## 2024-06-20 PROCEDURE — 71250 CT THORAX DX C-: CPT

## 2024-07-03 ENCOUNTER — HOSPITAL ENCOUNTER (OUTPATIENT)
Dept: CARDIOLOGY | Facility: HOSPITAL | Age: 79
Discharge: HOME | End: 2024-07-03
Attending: FAMILY MEDICINE
Payer: COMMERCIAL

## 2024-07-03 ENCOUNTER — HOSPITAL ENCOUNTER (OUTPATIENT)
Dept: NUCLEAR MEDICINE | Facility: HOSPITAL | Age: 79
Discharge: HOME | End: 2024-07-03
Attending: FAMILY MEDICINE
Payer: COMMERCIAL

## 2024-07-03 DIAGNOSIS — I25.84 CORONARY ARTERY CALCIFICATION OF NATIVE ARTERY: ICD-10-CM

## 2024-07-03 DIAGNOSIS — R05.3 CHRONIC COUGH: ICD-10-CM

## 2024-07-03 DIAGNOSIS — Z87.891 PERSONAL HISTORY OF TOBACCO USE, PRESENTING HAZARDS TO HEALTH: ICD-10-CM

## 2024-07-03 DIAGNOSIS — Z82.49 FAMILY HISTORY OF ISCHEMIC HEART DISEASE: ICD-10-CM

## 2024-07-03 DIAGNOSIS — I25.10 CORONARY ARTERY CALCIFICATION OF NATIVE ARTERY: ICD-10-CM

## 2024-07-03 DIAGNOSIS — R06.02 SHORTNESS OF BREATH: ICD-10-CM

## 2024-07-03 LAB
CV STRESS CURRENT BP HE: NORMAL
CV STRESS CURRENT HR HE: 54
CV STRESS CURRENT HR HE: 60
CV STRESS CURRENT HR HE: 70
CV STRESS CURRENT HR HE: 70
CV STRESS CURRENT HR HE: 71
CV STRESS CURRENT HR HE: 73
CV STRESS CURRENT HR HE: 74
CV STRESS CURRENT HR HE: 74
CV STRESS CURRENT HR HE: 78
CV STRESS CURRENT HR HE: 82
CV STRESS CURRENT HR HE: 82
CV STRESS CURRENT HR HE: 83
CV STRESS CURRENT HR HE: 83
CV STRESS CURRENT HR HE: 85
CV STRESS CURRENT HR HE: 88
CV STRESS CURRENT HR HE: 89
CV STRESS CURRENT HR HE: 91
CV STRESS DEVIATION TIME HE: NORMAL
CV STRESS ECHO PERCENT HR HE: NORMAL
CV STRESS EXERCISE STAGE HE: NORMAL
CV STRESS FINAL RESTING BP HE: NORMAL
CV STRESS FINAL RESTING HR HE: 70
CV STRESS MAX HR HE: 92
CV STRESS MAX TREADMILL GRADE HE: 0
CV STRESS MAX TREADMILL SPEED HE: 0
CV STRESS PEAK DIA BP HE: NORMAL
CV STRESS PEAK SYS BP HE: NORMAL
CV STRESS PHASE HE: NORMAL
CV STRESS PROTOCOL HE: NORMAL
CV STRESS RESTING PT POSITION HE: NORMAL
CV STRESS ST DEVIATION AMOUNT HE: NORMAL
CV STRESS ST DEVIATION ELEVATION HE: NORMAL
CV STRESS ST EVELATION AMOUNT HE: NORMAL
CV STRESS TEST TYPE HE: NORMAL
CV STRESS TOTAL STAGE TIME MIN 1 HE: NORMAL
LVEF ECHO: NORMAL
RATE PRESSURE PRODUCT: NORMAL
STRESS ECHO BASELINE DIASTOLIC HE: 66
STRESS ECHO BASELINE HR: 66
STRESS ECHO BASELINE SYSTOLIC BP: 109
STRESS ECHO CALCULATED PERCENT HR: 65 %
STRESS ECHO LAST STRESS DIASTOLIC BP: 67
STRESS ECHO LAST STRESS HR: 82
STRESS ECHO LAST STRESS SYSTOLIC BP: 153
STRESS ECHO TARGET HR: 141

## 2024-07-03 PROCEDURE — 250N000011 HC RX IP 250 OP 636: Performed by: FAMILY MEDICINE

## 2024-07-03 PROCEDURE — 78452 HT MUSCLE IMAGE SPECT MULT: CPT

## 2024-07-03 PROCEDURE — 343N000001 HC RX 343: Performed by: FAMILY MEDICINE

## 2024-07-03 PROCEDURE — C8929 TTE W OR WO FOL WCON,DOPPLER: HCPCS

## 2024-07-03 PROCEDURE — 93016 CV STRESS TEST SUPVJ ONLY: CPT | Performed by: INTERNAL MEDICINE

## 2024-07-03 PROCEDURE — A9500 TC99M SESTAMIBI: HCPCS | Performed by: FAMILY MEDICINE

## 2024-07-03 PROCEDURE — 255N000002 HC RX 255 OP 636: Performed by: FAMILY MEDICINE

## 2024-07-03 PROCEDURE — 93017 CV STRESS TEST TRACING ONLY: CPT

## 2024-07-03 PROCEDURE — 93018 CV STRESS TEST I&R ONLY: CPT | Performed by: INTERNAL MEDICINE

## 2024-07-03 PROCEDURE — 93306 TTE W/DOPPLER COMPLETE: CPT | Mod: 26 | Performed by: INTERNAL MEDICINE

## 2024-07-03 PROCEDURE — 78452 HT MUSCLE IMAGE SPECT MULT: CPT | Mod: 26 | Performed by: INTERNAL MEDICINE

## 2024-07-03 RX ORDER — REGADENOSON 0.08 MG/ML
0.4 INJECTION, SOLUTION INTRAVENOUS ONCE
Status: COMPLETED | OUTPATIENT
Start: 2024-07-03 | End: 2024-07-03

## 2024-07-03 RX ORDER — AMINOPHYLLINE 25 MG/ML
50-100 INJECTION, SOLUTION INTRAVENOUS
Status: COMPLETED | OUTPATIENT
Start: 2024-07-03 | End: 2024-07-03

## 2024-07-03 RX ADMIN — Medication 9.2 MILLICURIE: at 08:47

## 2024-07-03 RX ADMIN — PERFLUTREN 2 ML: 6.52 INJECTION, SUSPENSION INTRAVENOUS at 13:05

## 2024-07-03 RX ADMIN — REGADENOSON 0.4 MG: 0.08 INJECTION, SOLUTION INTRAVENOUS at 10:18

## 2024-07-03 RX ADMIN — AMINOPHYLLINE 50 MG: 25 INJECTION, SOLUTION INTRAVENOUS at 10:21

## 2024-07-03 RX ADMIN — Medication 29.2 MILLICURIE: at 11:13

## 2024-07-05 DIAGNOSIS — I25.84 CORONARY ARTERY CALCIFICATION OF NATIVE ARTERY: ICD-10-CM

## 2024-07-05 DIAGNOSIS — I25.10 CORONARY ARTERY CALCIFICATION OF NATIVE ARTERY: ICD-10-CM

## 2024-07-05 DIAGNOSIS — R06.02 SHORTNESS OF BREATH: Primary | ICD-10-CM

## 2024-07-05 DIAGNOSIS — Z82.49 FAMILY HISTORY OF ISCHEMIC HEART DISEASE: ICD-10-CM

## 2024-07-13 DIAGNOSIS — I10 ESSENTIAL HYPERTENSION: ICD-10-CM

## 2024-07-16 RX ORDER — HYDROCHLOROTHIAZIDE 25 MG/1
TABLET ORAL
Qty: 90 TABLET | Refills: 1 | Status: SHIPPED | OUTPATIENT
Start: 2024-07-16 | End: 2024-09-12

## 2024-07-18 NOTE — LETTER
July 18, 2024  Re: Nika Navarrete  YOB: 1945    Dear , Paula Lu MD,    Thank you for your referral to the [facility]. We have been unable to schedule the referral after several contact attempts.      If you have any questions or concerns, please contact our office at Dept: [phone].        Sincerely,  [facility]

## 2024-07-19 ENCOUNTER — TELEPHONE (OUTPATIENT)
Dept: FAMILY MEDICINE | Facility: CLINIC | Age: 79
End: 2024-07-19
Payer: COMMERCIAL

## 2024-07-19 NOTE — TELEPHONE ENCOUNTER
FYI - Status Update    Who is Calling: patient    Update: patient was given the TE message and was transferred to Cardiology dept. To schedule the appointment.       Does caller want a call/response back: No

## 2024-07-19 NOTE — TELEPHONE ENCOUNTER
Attempted to call patient, left message for return call to clinic. Please see provider notation below and provide patient with cardiology number ([phone]). Thanks!        Anya Smith RN

## 2024-07-19 NOTE — PROGRESS NOTES
Please contact patient to let her know cardiology has been trying to contact her to schedule her to see a cardiologist to discuss her shortness of breath and do further work up. Please give her their phone number to schedule.

## 2024-09-03 ENCOUNTER — OFFICE VISIT (OUTPATIENT)
Dept: CARDIOLOGY | Facility: CLINIC | Age: 79
End: 2024-09-03
Attending: FAMILY MEDICINE
Payer: COMMERCIAL

## 2024-09-03 VITALS
RESPIRATION RATE: 16 BRPM | DIASTOLIC BLOOD PRESSURE: 72 MMHG | BODY MASS INDEX: 28.17 KG/M2 | HEIGHT: 64 IN | SYSTOLIC BLOOD PRESSURE: 128 MMHG | WEIGHT: 165 LBS | HEART RATE: 73 BPM

## 2024-09-03 DIAGNOSIS — R06.02 SHORTNESS OF BREATH: ICD-10-CM

## 2024-09-03 DIAGNOSIS — E78.5 HYPERLIPIDEMIA LDL GOAL <130: Chronic | ICD-10-CM

## 2024-09-03 DIAGNOSIS — Z82.49 FAMILY HISTORY OF ISCHEMIC HEART DISEASE: ICD-10-CM

## 2024-09-03 DIAGNOSIS — I25.10 CORONARY ARTERY CALCIFICATION OF NATIVE ARTERY: ICD-10-CM

## 2024-09-03 DIAGNOSIS — I25.84 CORONARY ARTERY CALCIFICATION OF NATIVE ARTERY: ICD-10-CM

## 2024-09-03 DIAGNOSIS — I10 BENIGN ESSENTIAL HYPERTENSION: ICD-10-CM

## 2024-09-03 DIAGNOSIS — E11.65 TYPE 2 DIABETES MELLITUS WITH HYPERGLYCEMIA, WITHOUT LONG-TERM CURRENT USE OF INSULIN (H): Primary | ICD-10-CM

## 2024-09-03 PROCEDURE — 99204 OFFICE O/P NEW MOD 45 MIN: CPT | Performed by: INTERNAL MEDICINE

## 2024-09-03 NOTE — LETTER
9/3/2024    Paula Weaver MD  [address]  [facility] [address]    RE: Nika Navarrete       Dear Colleague,     I had the pleasure of seeing Nika Navarrete in the [facility].      [facility]  [phone]          Assessment/Recommendations   Patient with multiple risk factors for coronary artery disease including hyperlipidemia, family history of coronary artery disease, hypertension, type 2 diabetes and history of smoking but quit more than 10 years ago.  She has dramatic shortness of breath with activity which is worsening.  Pharmacologic nuclear study in July of this year did not show evidence of infarction or injury but certainly she could have balanced three-vessel coronary artery disease with a normal nuclear scan.  Because of the dramatic symptoms and multiple risk factors and coronary calcifications on recent chest CT, I would favor starting her coronary arteries.  We talked about diagnostic angiography versus CT angiography and voted for CT angiography to start.  I would have a low threshold for diagnostic angiogram if there are abnormalities of significance on the CT scan.    Her blood pressure is at goal,LDL was 96 in September 2023.  She is not antiplatelet agent but would have a low threshold pending the results of her CT coronary angiogram.    We will get back to her with the results of the CT scan and further recommendations.       History of Present Illness/Subjective    Ms. Nika Navarrete is a 78 year old female with known risk factors for coronary artery disease as noted above.  She has had shortness of breath with physical activity for quite some time.  She has had multiple tests.  Recent CT scan suggested mild emphysema and she does have a dramatic history of tobacco use but quit 10 years ago.  She had relatively normal pulmonary function tests but in 2021.  Sleep study in 2023 did not show significant obstructive sleep apnea.  She  "denies chest discomfort, orthopnea, paroxysmal nocturnal dyspnea.  Once in a while she will go sleep in the recliner but she thinks is more of her back.  She has no history of rheumatic fever, heart murmur, cerebrovascular accident or TIA.  No history of syncope.    She quit smoking about 10 years ago but smoked between 1 and 2 packs a day.  She is recently been told she is diabetic.  She has twin sisters that are 10 years younger and both of after heart surgery.  She thinks bypass surgery.  She is treated for hyperlipidemia and hypertension.    She grew up in [place].  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  She is not .  She has 3 children none of whom have heart problems.    ECG: Personally reviewed.  June 13, 2024 shows sinus rhythm at 61 bpm.  Mild anterior T wave changes.    Pharmacologic nuclear study this year showed no ischemia or infarction with normal left ventricular ejection fraction.       Physical Examination Review of Systems   /72 (BP Location: Right arm, Patient Position: Sitting, Cuff Size: Adult Regular)   Pulse 73   Resp 16   Ht 1.613 m (5' 3.5\")   Wt 74.8 kg (165 lb)   BMI 28.77 kg/m    Body mass index is 28.77 kg/m .  Wt Readings from Last 3 Encounters:   09/03/24 74.8 kg (165 lb)   06/13/24 75.8 kg (167 lb)   02/08/24 75.6 kg (166 lb 9.6 oz)     General Appearance:   Alert, cooperative and in no acute distress.   ENT/Mouth: Pink/moist oral mucosa   EYES:  no scleral icterus, normal conjunctivae   Neck: JVP normal. No Hepatojugular reflux. Thyroid not visualized.   Chest/Lungs:   Lungs are clear to auscultation, equal chest wall expansion.   Cardiovascular:   S1, S2 without murmur ,clicks or rubs. Brachial, radial and posterior tibial pulses are intact and symetric. No carotid bruits noted   Abdomen:  Nontender. BS+.    Extremities: No cyanosis, clubbing or edema   Skin: no xanthelasma, warm.    Neurologic: normal arm " "movement bilateral, no tremors     Psychiatric: Appropriate affect.      Enc Vitals  BP: 128/72  Pulse: 73  Resp: 16  Weight: 74.8 kg (165 lb)  Height: 161.3 cm (5' 3.5\")                                           Medical History  Surgical History Family History Social History   Past Medical History:   Diagnosis Date     ASCUS on Pap smear 01/01/2009    +HR HPV 66,[place]/ECC-negative     ASCUS on Pap smear 02/16/2009    Overview:  1/30/09:Pap--ASCUS, + HPV 66 (high risk). Recommend colposcopy. 3/4/09:Colpo--cervix hard to visualized; stenotic. ECC taken--negative--repeat Pap 6 months 1/5/2010:Pap--NIL. Repap 6 months. 8/5/10:pap--NIL. Repeat pap 1 year. 1/10/12:Pap--NIL. Rpt in 1 yr. Reminder sent to system     Benign neoplasm of colon 05/01/2001    tubular adenomatous polyp     Diverticulosis of colon (without mention of hemorrhage)      HTN (hypertension)      Hyperlipidemia      Osteoarthrosis     Past Surgical History:   Procedure Laterality Date     FACIAL COSMETIC SURGERY       MAMMOPLASTY AUGMENTATION  2005    same time as bilateral reduction states pt.     MAMMOPLASTY REDUCTION Bilateral 2005    bilateral reduction     ORTHOPEDIC SURGERY  2009    left ankle replacement     OTHER SURGICAL HISTORY      tummy tanja     SURGICAL HISTORY OF -   1994    D&C, cone biopsy      SURGICAL HISTORY OF -   3/25/03    colonoscopy - mid sigmoid diverticulosis, moderate; benign sigmoid polyps, Repeat 3 years.      SURGICAL HISTORY OF -   5/21/01    colonoscopy     SURGICAL HISTORY OF -   2005    breast augmentation     SURGICAL HISTORY OF -   2005    face lift, tummy tuck has lost 65 pounds     SURGICAL HISTORY OF -   9/11/2001    Oopherectomy unilat, has one left,      SURGICAL HISTORY OF -   child    Tonsils     SURGICAL HISTORY OF -   2007    L knee replacment     ZZC ENLARGE BREAST      Description: Breast Surgery Enlargement Procedure;  Recorded: 09/02/2014;    Family History   Problem Relation Age of Onset     Cancer " Mother         ovarian cancer     Breast Cancer Mother 50.00     Diabetes Father      Cancer Father         pancreatic and liver cancer     Diabetes Paternal Grandmother      Asthma No family hx of      Breast Cancer Other      Cancer - colorectal Other      Cancer Other      Breast Cancer Maternal Aunt      Breast Cancer Paternal Aunt      Ovarian Cancer Daughter     Social History     Socioeconomic History     Marital status:      Spouse name: Not on file     Number of children: 3     Years of education: Not on file     Highest education level: Not on file   Occupational History     Occupation: Office work -- [email or web address]     Employer: [employer]   Tobacco Use     Smoking status: Former     Current packs/day: 0.00     Average packs/day: 2.0 packs/day for 40.0 years (80.0 ttl pk-yrs)     Types: Cigarettes     Start date: 6/3/1967     Quit date: 6/3/2007     Years since quittin.2     Passive exposure: Never     Smokeless tobacco: Never     Tobacco comments:     quit    Vaping Use     Vaping status: Never Used   Substance and Sexual Activity     Alcohol use: No     Comment: occasional     Drug use: No     Sexual activity: Never     Partners: Male     Comment: Single; children- 3   Other Topics Concern     Parent/sibling w/ CABG, MI or angioplasty before 65F 55M? Not Asked   Social History Narrative    . Does have a significant other although doesn't live with him  Lives alone.  Has 3 children. Has 7 grandchildren.  Watches TV, plays [employer]. 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      Social Determinants of Health     Financial Resource Strain: Not on file   Food Insecurity: Not on file   Transportation Needs: Not on file   Physical Activity: Not on file   Stress: Not on file   Social Connections: Not on file   Interpersonal Safety: Low Risk  (2024)    Interpersonal Safety      Do you feel physically and emotionally safe where you  currently live?: Yes      Within the past 12 months, have you been hit, slapped, kicked or otherwise physically hurt by someone?: No      Within the past 12 months, have you been humiliated or emotionally abused in other ways by your partner or ex-partner?: No   Housing Stability: Not on file          Medications  Allergies   Current Outpatient Medications   Medication Sig Dispense Refill     atorvastatin (LIPITOR) 40 MG tablet TAKE 1 TABLET BY MOUTH EVERYDAY AT BEDTIME 90 tablet 2     ergocalciferol (VITAMIN D2) 400 units (10 mcg) TABS tablet Take 400 Units by mouth daily. Take 2 capsules once daily       FLUoxetine (PROZAC) 10 MG capsule Take 1 capsule (10 mg) by mouth daily 180 capsule 1     hydrochlorothiazide (HYDRODIURIL) 25 MG tablet TAKE 1 TABLET BY MOUTH EVERY DAY 90 tablet 1     lisinopril (ZESTRIL) 40 MG tablet TAKE 1 TABLET BY MOUTH EVERY DAY 90 tablet 3    Allergies   Allergen Reactions     Morphine And Codeine Nausea     Seasonal Allergies Itching         Lab Results    Chemistry/lipid CBC Cardiac Enzymes/BNP/TSH/INR   Lab Results   Component Value Date    CHOL 168 09/07/2023    HDL 45 (L) 09/07/2023    TRIG 136 09/07/2023    BUN 22.9 06/13/2024     06/13/2024    CO2 26 06/13/2024    Lab Results   Component Value Date    WBC 7.5 06/13/2024    HGB 13.3 06/13/2024    HCT 39.9 06/13/2024    MCV 88 06/13/2024     06/13/2024    Lab Results   Component Value Date    TSH 2.16 02/08/2024    INR 1.74 (H) 04/07/2008                                                Thank you for allowing me to participate in the care of your patient.      Sincerely,     Juan Oates MD     [facility]  cc:   Paula Weaver MD  [address] [place] [address]

## 2024-09-03 NOTE — PROGRESS NOTES
[facility]  [phone]          Assessment/Recommendations   Patient with multiple risk factors for coronary artery disease including hyperlipidemia, family history of coronary artery disease, hypertension, type 2 diabetes and history of smoking but quit more than 10 years ago.  She has dramatic shortness of breath with activity which is worsening.  Pharmacologic nuclear study in July of this year did not show evidence of infarction or injury but certainly she could have balanced three-vessel coronary artery disease with a normal nuclear scan.  Because of the dramatic symptoms and multiple risk factors and coronary calcifications on recent chest CT, I would favor starting her coronary arteries.  We talked about diagnostic angiography versus CT angiography and voted for CT angiography to start.  I would have a low threshold for diagnostic angiogram if there are abnormalities of significance on the CT scan.    Her blood pressure is at goal,LDL was 96 in September 2023.  She is not antiplatelet agent but would have a low threshold pending the results of her CT coronary angiogram.    We will get back to her with the results of the CT scan and further recommendations.       History of Present Illness/Subjective    Ms. Nika Navarrete is a 78 year old female with known risk factors for coronary artery disease as noted above.  She has had shortness of breath with physical activity for quite some time.  She has had multiple tests.  Recent CT scan suggested mild emphysema and she does have a dramatic history of tobacco use but quit 10 years ago.  She had relatively normal pulmonary function tests but in 2021.  Sleep study in 2023 did not show significant obstructive sleep apnea.  She denies chest discomfort, orthopnea, paroxysmal nocturnal dyspnea.  Once in a while she will go sleep in the recliner but she thinks is more of her back.  She has no history of rheumatic fever, heart murmur,  "cerebrovascular accident or TIA.  No history of syncope.    She quit smoking about 10 years ago but smoked between 1 and 2 packs a day.  She is recently been told she is diabetic.  She has twin sisters that are 10 years younger and both of after heart surgery.  She thinks bypass surgery.  She is treated for hyperlipidemia and hypertension.    She grew up in [facility].  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  She is not .  She has 3 children none of whom have heart problems.    ECG: Personally reviewed.  June 13, 2024 shows sinus rhythm at 61 bpm.  Mild anterior T wave changes.    Pharmacologic nuclear study this year showed no ischemia or infarction with normal left ventricular ejection fraction.       Physical Examination Review of Systems   /72 (BP Location: Right arm, Patient Position: Sitting, Cuff Size: Adult Regular)   Pulse 73   Resp 16   Ht 1.613 m (5' 3.5\")   Wt 74.8 kg (165 lb)   BMI 28.77 kg/m    Body mass index is 28.77 kg/m .  Wt Readings from Last 3 Encounters:   09/03/24 74.8 kg (165 lb)   06/13/24 75.8 kg (167 lb)   02/08/24 75.6 kg (166 lb 9.6 oz)     General Appearance:   Alert, cooperative and in no acute distress.   ENT/Mouth: Pink/moist oral mucosa   EYES:  no scleral icterus, normal conjunctivae   Neck: JVP normal. No Hepatojugular reflux. Thyroid not visualized.   Chest/Lungs:   Lungs are clear to auscultation, equal chest wall expansion.   Cardiovascular:   S1, S2 without murmur ,clicks or rubs. Brachial, radial and posterior tibial pulses are intact and symetric. No carotid bruits noted   Abdomen:  Nontender. BS+.    Extremities: No cyanosis, clubbing or edema   Skin: no xanthelasma, warm.    Neurologic: normal arm movement bilateral, no tremors     Psychiatric: Appropriate affect.      Enc Vitals  BP: 128/72  Pulse: 73  Resp: 16  Weight: 74.8 kg (165 lb)  Height: 161.3 cm (5' 3.5\")                                    "        Medical History  Surgical History Family History Social History   Past Medical History:   Diagnosis Date    ASCUS on Pap smear 01/01/2009    +HR HPV 66,[place]/ECC-negative    ASCUS on Pap smear 02/16/2009    Overview:  1/30/09:Pap--ASCUS, + HPV 66 (high risk). Recommend colposcopy. 3/4/09:Colpo--cervix hard to visualized; stenotic. ECC taken--negative--repeat Pap 6 months 1/5/2010:Pap--NIL. Repap 6 months. 8/5/10:pap--NIL. Repeat pap 1 year. 1/10/12:Pap--NIL. Rpt in 1 yr. Reminder sent to system    Benign neoplasm of colon 05/01/2001    tubular adenomatous polyp    Diverticulosis of colon (without mention of hemorrhage)     HTN (hypertension)     Hyperlipidemia     Osteoarthrosis     Past Surgical History:   Procedure Laterality Date    FACIAL COSMETIC SURGERY      MAMMOPLASTY AUGMENTATION  2005    same time as bilateral reduction states pt.    MAMMOPLASTY REDUCTION Bilateral 2005    bilateral reduction    ORTHOPEDIC SURGERY  2009    left ankle replacement    OTHER SURGICAL HISTORY      tummy tuck    SURGICAL HISTORY OF -   1994    D&C, cone biopsy     SURGICAL HISTORY OF -   3/25/03    colonoscopy - mid sigmoid diverticulosis, moderate; benign sigmoid polyps, Repeat 3 years.     SURGICAL HISTORY OF -   5/21/01    colonoscopy    SURGICAL HISTORY OF -   2005    breast augmentation    SURGICAL HISTORY OF -   2005    face lift, tummy tuck has lost 65 pounds    SURGICAL HISTORY OF -   9/11/2001    Oopherectomy unilat, has one left,     SURGICAL HISTORY OF -   child    Tonsils    SURGICAL HISTORY OF -   2007    L knee replacment    ZZC ENLARGE BREAST      Description: Breast Surgery Enlargement Procedure;  Recorded: 09/02/2014;    Family History   Problem Relation Age of Onset    Cancer Mother         ovarian cancer    Breast Cancer Mother 50.00    Diabetes Father     Cancer Father         pancreatic and liver cancer    Diabetes Paternal Grandmother     Asthma No family hx of     Breast Cancer Other     Cancer -  colorectal Other     Cancer Other     Breast Cancer Maternal Aunt     Breast Cancer Paternal Aunt     Ovarian Cancer Daughter     Social History     Socioeconomic History    Marital status:      Spouse name: Not on file    Number of children: 3    Years of education: Not on file    Highest education level: Not on file   Occupational History    Occupation: Office work -- [employer]     Employer: [employer]   Tobacco Use    Smoking status: Former     Current packs/day: 0.00     Average packs/day: 2.0 packs/day for 40.0 years (80.0 ttl pk-yrs)     Types: Cigarettes     Start date: 6/3/1967     Quit date: 6/3/2007     Years since quittin.2     Passive exposure: Never    Smokeless tobacco: Never    Tobacco comments:     quit    Vaping Use    Vaping status: Never Used   Substance and Sexual Activity    Alcohol use: No     Comment: occasional    Drug use: No    Sexual activity: Never     Partners: Male     Comment: Single; children- 3   Other Topics Concern    Parent/sibling w/ CABG, MI or angioplasty before 65F 55M? Not Asked   Social History Narrative    . Does have a significant other although doesn't live with him  Lives alone.  Has 3 children. Has 7 grandchildren.  Watches TV, plays [email or web address]. 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      Social Determinants of Health     Financial Resource Strain: Not on file   Food Insecurity: Not on file   Transportation Needs: Not on file   Physical Activity: Not on file   Stress: Not on file   Social Connections: Not on file   Interpersonal Safety: Low Risk  (2024)    Interpersonal Safety     Do you feel physically and emotionally safe where you currently live?: Yes     Within the past 12 months, have you been hit, slapped, kicked or otherwise physically hurt by someone?: No     Within the past 12 months, have you been humiliated or emotionally abused in other ways by your partner or ex-partner?: No    Housing Stability: Not on file          Medications  Allergies   Current Outpatient Medications   Medication Sig Dispense Refill    atorvastatin (LIPITOR) 40 MG tablet TAKE 1 TABLET BY MOUTH EVERYDAY AT BEDTIME 90 tablet 2    ergocalciferol (VITAMIN D2) 400 units (10 mcg) TABS tablet Take 400 Units by mouth daily. Take 2 capsules once daily      FLUoxetine (PROZAC) 10 MG capsule Take 1 capsule (10 mg) by mouth daily 180 capsule 1    hydrochlorothiazide (HYDRODIURIL) 25 MG tablet TAKE 1 TABLET BY MOUTH EVERY DAY 90 tablet 1    lisinopril (ZESTRIL) 40 MG tablet TAKE 1 TABLET BY MOUTH EVERY DAY 90 tablet 3    Allergies   Allergen Reactions    Morphine And Codeine Nausea    Seasonal Allergies Itching         Lab Results    Chemistry/lipid CBC Cardiac Enzymes/BNP/TSH/INR   Lab Results   Component Value Date    CHOL 168 09/07/2023    HDL 45 (L) 09/07/2023    TRIG 136 09/07/2023    BUN 22.9 06/13/2024     06/13/2024    CO2 26 06/13/2024    Lab Results   Component Value Date    WBC 7.5 06/13/2024    HGB 13.3 06/13/2024    HCT 39.9 06/13/2024    MCV 88 06/13/2024     06/13/2024    Lab Results   Component Value Date    TSH 2.16 02/08/2024    INR 1.74 (H) 04/07/2008

## 2024-09-12 ENCOUNTER — OFFICE VISIT (OUTPATIENT)
Dept: FAMILY MEDICINE | Facility: CLINIC | Age: 79
End: 2024-09-12
Payer: COMMERCIAL

## 2024-09-12 VITALS
BODY MASS INDEX: 28.44 KG/M2 | OXYGEN SATURATION: 96 % | DIASTOLIC BLOOD PRESSURE: 66 MMHG | TEMPERATURE: 97.6 F | WEIGHT: 166.6 LBS | RESPIRATION RATE: 14 BRPM | HEIGHT: 64 IN | SYSTOLIC BLOOD PRESSURE: 126 MMHG | HEART RATE: 63 BPM

## 2024-09-12 DIAGNOSIS — E83.52 SERUM CALCIUM ELEVATED: ICD-10-CM

## 2024-09-12 DIAGNOSIS — E78.01 ESSENTIAL FAMILIAL HYPERCHOLESTEROLEMIA: ICD-10-CM

## 2024-09-12 DIAGNOSIS — R06.02 SHORTNESS OF BREATH: ICD-10-CM

## 2024-09-12 DIAGNOSIS — I10 ESSENTIAL HYPERTENSION: ICD-10-CM

## 2024-09-12 DIAGNOSIS — F32.0 MILD MAJOR DEPRESSION (H): ICD-10-CM

## 2024-09-12 DIAGNOSIS — I25.10 CORONARY ARTERY CALCIFICATION OF NATIVE ARTERY: ICD-10-CM

## 2024-09-12 DIAGNOSIS — Z00.00 ENCOUNTER FOR MEDICARE ANNUAL WELLNESS EXAM: Primary | ICD-10-CM

## 2024-09-12 DIAGNOSIS — N18.31 CHRONIC KIDNEY DISEASE, STAGE 3A (H): ICD-10-CM

## 2024-09-12 DIAGNOSIS — I25.84 CORONARY ARTERY CALCIFICATION OF NATIVE ARTERY: ICD-10-CM

## 2024-09-12 DIAGNOSIS — E11.65 TYPE 2 DIABETES MELLITUS WITH HYPERGLYCEMIA, WITHOUT LONG-TERM CURRENT USE OF INSULIN (H): ICD-10-CM

## 2024-09-12 LAB
ERYTHROCYTE [DISTWIDTH] IN BLOOD BY AUTOMATED COUNT: 13.8 % (ref 10–15)
HBA1C MFR BLD: 6.6 % (ref 0–5.6)
HCT VFR BLD AUTO: 40 % (ref 35–47)
HGB BLD-MCNC: 13.1 G/DL (ref 11.7–15.7)
MCH RBC QN AUTO: 28.6 PG (ref 26.5–33)
MCHC RBC AUTO-ENTMCNC: 32.8 G/DL (ref 31.5–36.5)
MCV RBC AUTO: 87 FL (ref 78–100)
PLATELET # BLD AUTO: 200 10E3/UL (ref 150–450)
RBC # BLD AUTO: 4.58 10E6/UL (ref 3.8–5.2)
WBC # BLD AUTO: 7.4 10E3/UL (ref 4–11)

## 2024-09-12 PROCEDURE — 85027 COMPLETE CBC AUTOMATED: CPT | Performed by: FAMILY MEDICINE

## 2024-09-12 PROCEDURE — 80061 LIPID PANEL: CPT | Performed by: FAMILY MEDICINE

## 2024-09-12 PROCEDURE — 84443 ASSAY THYROID STIM HORMONE: CPT | Performed by: FAMILY MEDICINE

## 2024-09-12 PROCEDURE — 83036 HEMOGLOBIN GLYCOSYLATED A1C: CPT | Performed by: FAMILY MEDICINE

## 2024-09-12 PROCEDURE — G0439 PPPS, SUBSEQ VISIT: HCPCS | Performed by: FAMILY MEDICINE

## 2024-09-12 PROCEDURE — 83970 ASSAY OF PARATHORMONE: CPT | Performed by: FAMILY MEDICINE

## 2024-09-12 PROCEDURE — 99214 OFFICE O/P EST MOD 30 MIN: CPT | Mod: 25 | Performed by: FAMILY MEDICINE

## 2024-09-12 PROCEDURE — 82306 VITAMIN D 25 HYDROXY: CPT | Performed by: FAMILY MEDICINE

## 2024-09-12 PROCEDURE — 36415 COLL VENOUS BLD VENIPUNCTURE: CPT | Performed by: FAMILY MEDICINE

## 2024-09-12 PROCEDURE — 80053 COMPREHEN METABOLIC PANEL: CPT | Performed by: FAMILY MEDICINE

## 2024-09-12 RX ORDER — ATORVASTATIN CALCIUM 40 MG/1
40 TABLET, FILM COATED ORAL AT BEDTIME
Qty: 90 TABLET | Refills: 3 | Status: SHIPPED | OUTPATIENT
Start: 2024-09-12

## 2024-09-12 RX ORDER — HYDROCHLOROTHIAZIDE 25 MG/1
25 TABLET ORAL DAILY
Qty: 90 TABLET | Refills: 3 | Status: SHIPPED | OUTPATIENT
Start: 2024-09-12

## 2024-09-12 RX ORDER — LISINOPRIL 40 MG/1
40 TABLET ORAL DAILY
Qty: 90 TABLET | Refills: 3 | Status: SHIPPED | OUTPATIENT
Start: 2024-09-12

## 2024-09-12 RX ORDER — FLUOXETINE 10 MG/1
10 CAPSULE ORAL DAILY
Qty: 90 CAPSULE | Refills: 3 | Status: SHIPPED | OUTPATIENT
Start: 2024-09-12

## 2024-09-12 ASSESSMENT — PATIENT HEALTH QUESTIONNAIRE - PHQ9
SUM OF ALL RESPONSES TO PHQ QUESTIONS 1-9: 15
10. IF YOU CHECKED OFF ANY PROBLEMS, HOW DIFFICULT HAVE THESE PROBLEMS MADE IT FOR YOU TO DO YOUR WORK, TAKE CARE OF THINGS AT HOME, OR GET ALONG WITH OTHER PEOPLE: SOMEWHAT DIFFICULT
SUM OF ALL RESPONSES TO PHQ QUESTIONS 1-9: 15

## 2024-09-12 NOTE — PATIENT INSTRUCTIONS
Patient Education   Preventive Care Advice   This is general advice given by our system to help you stay healthy. However, your care team may have specific advice just for you. Please talk to your care team about your preventive care needs.  Nutrition  Eat 5 or more servings of fruits and vegetables each day.  Try wheat bread, brown rice and whole grain pasta (instead of white bread, rice, and pasta).  Get enough calcium and vitamin D. Check the label on foods and aim for 100% of the RDA (recommended daily allowance).  Lifestyle  Exercise at least 150 minutes each week  (30 minutes a day, 5 days a week).  Do muscle strengthening activities 2 days a week. These help control your weight and prevent disease.  No smoking.  Wear sunscreen to prevent skin cancer.  Have a dental exam and cleaning every 6 months.  Yearly exams  See your health care team every year to talk about:  Any changes in your health.  Any medicines your care team has prescribed.  Preventive care, family planning, and ways to prevent chronic diseases.  Shots (vaccines)   HPV shots (up to age 26), if you've never had them before.  Hepatitis B shots (up to age 59), if you've never had them before.  COVID-19 shot: Get this shot when it's due.  Flu shot: Get a flu shot every year.  Tetanus shot: Get a tetanus shot every 10 years.  Pneumococcal, hepatitis A, and RSV shots: Ask your care team if you need these based on your risk.  Shingles shot (for age 50 and up)  General health tests  Diabetes screening:  Starting at age 35, Get screened for diabetes at least every 3 years.  If you are younger than age 35, ask your care team if you should be screened for diabetes.  Cholesterol test: At age 39, start having a cholesterol test every 5 years, or more often if advised.  Bone density scan (DEXA): At age 50, ask your care team if you should have this scan for osteoporosis (brittle bones).  Hepatitis C: Get tested at least once in your life.  STIs (sexually  transmitted infections)  Before age 24: Ask your care team if you should be screened for STIs.  After age 24: Get screened for STIs if you're at risk. You are at risk for STIs (including HIV) if:  You are sexually active with more than one person.  You don't use condoms every time.  You or a partner was diagnosed with a sexually transmitted infection.  If you are at risk for HIV, ask about PrEP medicine to prevent HIV.  Get tested for HIV at least once in your life, whether you are at risk for HIV or not.  Cancer screening tests  Cervical cancer screening: If you have a cervix, begin getting regular cervical cancer screening tests starting at age 21.  Breast cancer scan (mammogram): If you've ever had breasts, begin having regular mammograms starting at age 40. This is a scan to check for breast cancer.  Colon cancer screening: It is important to start screening for colon cancer at age 45.  Have a colonoscopy test every 10 years (or more often if you're at risk) Or, ask your provider about stool tests like a FIT test every year or Cologuard test every 3 years.  To learn more about your testing options, visit:   .  For help making a decision, visit:   https://bit.ly/xk88575.  Prostate cancer screening test: If you have a prostate, ask your care team if a prostate cancer screening test (PSA) at age 55 is right for you.  Lung cancer screening: If you are a current or former smoker ages 50 to 80, ask your care team if ongoing lung cancer screenings are right for you.  For informational purposes only. Not to replace the advice of your health care provider. Copyright   2023 [employer]. All rights reserved. Clinically reviewed by the [facility]. [employer] 886949 - REV 01/24.  Your Health Risk Assessment indicates you feel you are not in good health    A healthy lifestyle helps keep the body fit and the mind alert. It helps protect you from disease, helps you fight disease, and  helps prevent chronic disease (disease that doesn't go away) from getting worse. This is important as you get older and begin to notice twinges in muscles and joints and a decline in the strength and stamina you once took for granted. A healthy lifestyle includes good healthcare, good nutrition, weight control, recreation, and regular exercise. Avoid harmful substances and do what you can to keep safe. Another part of a healthy lifestyle is stay mentally active and socially involved.    Good healthcare   Have a wellness visit every year.   If you have new symptoms, let us know right away. Don't wait until the next checkup.   Take medicines exactly as prescribed and keep your medicines in a safe place. Tell us if your medicine causes problems.   Healthy diet and weight control   Eat 3 or 4 small, nutritious, low-fat, high-fiber meals a day. Include a variety of fruits, vegetables, and whole-grain foods.   Make sure you get enough calcium in your diet. Calcium, vitamin D, and exercise help prevent osteoporosis (bone thinning).   If you live alone, try eating with others when you can. That way you get a good meal and have company while you eat it.   Try to keep a healthy weight. If you eat more calories than your body uses for energy, it will be stored as fat and you will gain weight.     Recreation   Recreation is not limited to sports and team events. It includes any activity that provides relaxation, interest, enjoyment, and exercise. Recreation provides an outlet for physical, mental, and social energy. It can give a sense of worth and achievement. It can help you stay healthy.    Mental Exercise and Social Involvement  Mental and emotional health is as important as physical health. Keep in touch with friends and family. Stay as active as possible. Continue to learn and challenge yourself.   Things you can do to stay mentally active are:  Learn something new, like a foreign language or musical instrument.   Play  "SCRABBLE or do crossword puzzles. If you cannot find people to play these games with you at home, you can play them with others on your computer through the Internet.   Join a games club--anything from card games to chess or checkers or lawn bowling.   Start a new hobby.   Go back to school.   Volunteer.   Read.   Keep up with world events.  Learning About Being Physically Active  What is physical activity?     Being physically active means doing any kind of activity that gets your body moving.  The types of physical activity that can help you get fit and stay healthy include:  Aerobic or \"cardio\" activities. These make your heart beat faster and make you breathe harder, such as brisk walking, riding a bike, or running. They strengthen your heart and lungs and build up your endurance.  Strength training activities. These make your muscles work against, or \"resist,\" something. Examples include lifting weights or doing push-ups. These activities help tone and strengthen your muscles and bones.  Stretches. These let you move your joints and muscles through their full range of motion. Stretching helps you be more flexible.  Reaching a balance between these three types of physical activity is important because each one contributes to your overall fitness.  What are the benefits of being active?  Being active is one of the best things you can do for your health. It helps you to:  Feel stronger and have more energy to do all the things you like to do.  Focus better at school or work.  Feel, think, and sleep better.  Reach and stay at a healthy weight.  Lose fat and build lean muscle.  Lower your risk for serious health problems, including diabetes, heart attack, high blood pressure, and some cancers.  Keep your heart, lungs, bones, muscles, and joints strong and healthy.  How can you make being active part of your life?  Start slowly. Make it your long-term goal to get at least 30 minutes of exercise on most days of the " "week. Walking is a good choice. You also may want to do other activities, such as running, swimming, cycling, or playing tennis or team sports.  Pick activities that you like--ones that make your heart beat faster, your muscles stronger, and your muscles and joints more flexible. If you find more than one thing you like doing, do them all. You don't have to do the same thing every day.  Get your heart pumping every day. Any activity that makes your heart beat faster and keeps it at that rate for a while counts.  Here are some great ways to get your heart beating faster:  Go for a brisk walk, run, or hike.  Go for a swim or bike ride.  Take an online exercise class or dance.  Play a game of touch football, basketball, or soccer.  Play tennis, pickleball, or racquetball.  Climb stairs.  Even some household chores can be aerobic. Just do them at a faster pace. Raking or mowing the lawn, sweeping the garage, and vacuuming and cleaning your home all can help get your heart rate up.  Strengthen your muscles during the week. You don't have to lift heavy weights or grow big, bulky muscles to get stronger. Doing a few simple activities that make your muscles work against, or \"resist,\" something can help you get stronger. Aim for at least twice a week.  For example, you can:  Do push-ups or sit-ups, which use your own body weight as resistance.  Lift weights or dumbbells or use stretch bands at home or in a gym or community center.  Stretch your muscles often. Stretching will help you as you become more active. It can help you stay flexible and loosen tight muscles. It can also help improve your balance and posture and can be a great way to relax.  Be sure to stretch the muscles you'll be using when you work out. It's best to warm your muscles slightly before you stretch them. Walk or do some other light aerobic activity for a few minutes. Then start stretching.  When you stretch your muscles:  Do it slowly. Stretching is not " "about going fast or making sudden movements.  Don't push or bounce during a stretch.  Hold each stretch for at least 15 to 30 seconds, if you can. You should feel a stretch in the muscle, but not pain.  Breathe out as you do the stretch. Then breathe in as you hold the stretch. Don't hold your breath.  If you're worried about how more activity might affect your health, have a checkup before you start. Follow any special advice your doctor gives you for getting a smart start.  Where can you learn more?  Go to https://www.[employer].net/patiented  Enter W332 in the search box to learn more about \"Learning About Being Physically Active.\"  Current as of: June 5, 2023  Content Version: 14.1 2006-2024 [employer].   Care instructions adapted under license by your healthcare professional. If you have questions about a medical condition or this instruction, always ask your healthcare professional. [employer] disclaims any warranty or liability for your use of this information.    Learning About Being Active as an Older Adult  Why is being active important as you get older?     Being active is one of the best things you can do for your health. And it's never too late to start. Being active--or getting active, if you aren't already--has definite benefits. It can:  Give you more energy,  Keep your mind sharp.  Improve balance to reduce your risk of falls.  Help you manage chronic illness with fewer medicines.  No matter how old you are, how fit you are, or what health problems you have, there is a form of activity that will work for you. And the more physical activity you can do, the better your overall health will be.  What kinds of activity can help you stay healthy?  Being more active will make your daily activities easier. Physical activity includes planned exercise and things you do in daily life. There are four types of activity:  Aerobic.  Doing aerobic activity makes your heart and lungs " strong.  Includes walking, dancing, and gardening.  Aim for at least 2  hours spread throughout the week.  It improves your energy and can help you sleep better.  Muscle-strengthening.  This type of activity can help maintain muscle and strengthen bones.  Includes climbing stairs, using resistance bands, and lifting or carrying heavy loads.  Aim for at least twice a week.  It can help protect the knees and other joints.  Stretching.  Stretching gives you better range of motion in joints and muscles.  Includes upper arm stretches, calf stretches, and gentle yoga.  Aim for at least twice a week, preferably after your muscles are warmed up from other activities.  It can help you function better in daily life.  Balancing.  This helps you stay coordinated and have good posture.  Includes heel-to-toe walking, ji chi, and certain types of yoga.  Aim for at least 3 days a week.  It can reduce your risk of falling.  Even if you have a hard time meeting the recommendations, it's better to be more active than less active. All activity done in each category counts toward your weekly total. You'd be surprised how daily things like carrying groceries, keeping up with grandchildren, and taking the stairs can add up.  What keeps you from being active?  If you've had a hard time being more active, you're not alone. Maybe you remember being able to do more. Or maybe you've never thought of yourself as being active. It's frustrating when you can't do the things you want. Being more active can help. What's holding you back?  Getting started.  Have a goal, but break it into easy tasks. Small steps build into big accomplishments.  Staying motivated.  If you feel like skipping your activity, remember your goal. Maybe you want to move better and stay independent. Every activity gets you one step closer.  Not feeling your best.  Start with 5 minutes of an activity you enjoy. Prove to yourself you can do it. As you get comfortable, increase  "your time.  You may not be where you want to be. But you're in the process of getting there. Everyone starts somewhere.  How can you find safe ways to stay active?  Talk with your doctor about any physical challenges you're facing. Make a plan with your doctor if you have a health problem or aren't sure how to get started with activity.  If you're already active, ask your doctor if there is anything you should change to stay safe as your body and health change.  If you tend to feel dizzy after you take medicine, avoid activity at that time. Try being active before you take your medicine. This will reduce your risk of falls.  If you plan to be active at home, make sure to clear your space before you get started. Remove things like TV cords, coffee tables, and throw rugs. It's safest to have plenty of space to move freely.  The key to getting more active is to take it slow and steady. Try to improve only a little bit at a time. Pick just one area to improve on at first. And if an activity hurts, stop and talk to your doctor.  Where can you learn more?  Go to https://www.[employer].net/patiented  Enter P600 in the search box to learn more about \"Learning About Being Active as an Older Adult.\"  Current as of: June 5, 2023  Content Version: 14.1 2006-2024 [employer].   Care instructions adapted under license by your healthcare professional. If you have questions about a medical condition or this instruction, always ask your healthcare professional. [employer] disclaims any warranty or liability for your use of this information.    Eating Healthy Foods: Care Instructions  With every meal, you can make healthy food choices. Try to eat a variety of fruits, vegetables, whole grains, lean proteins, and low-fat dairy products. This can help you get the right balance of nutrients, including vitamins and minerals. Small changes add up over time. You can start by adding one healthy food to your " "meals each day.    Try to make half your plate fruits and vegetables, one-fourth whole grains, and one-fourth lean proteins. Try including dairy with your meals.   Eat more fruits and vegetables. Try to have them with most meals and snacks.   Foods for healthy eating    Fruits    These can be fresh, frozen, canned, or dried.  Try to choose whole fruit rather than fruit juice.  Eat a variety of colors.    Vegetables    These can be fresh, frozen, canned, or dried.  Beans, peas, and lentils count too.    Whole grains    Choose whole-grain breads, cereals, and noodles.  Try brown rice.    Lean proteins    These can include lean meat, poultry, fish, and eggs.  You can also have tofu, beans, peas, lentils, nuts, and seeds.    Dairy    Try milk, yogurt, and cheese.  Choose low-fat or fat-free when you can.  If you need to, use lactose-free milk or fortified plant-based milk products, such as soy milk.    Water    Drink water when you're thirsty.  Limit sugar-sweetened drinks, including soda, fruit drinks, and sports drinks.  Where can you learn more?  Go to https://www.[employer].net/patiented  Enter T756 in the search box to learn more about \"Eating Healthy Foods: Care Instructions.\"  Current as of: September 20, 2023               Content Version: 14.0    0212-3360 [employer].   Care instructions adapted under license by your healthcare professional. If you have questions about a medical condition or this instruction, always ask your healthcare professional. [employer] disclaims any warranty or liability for your use of this information.      Nutrition for Older Adults: Care Instructions  Overview     Good nutrition is important at any age. But it is especially important for older adults. Eating healthy foods helps keep your body strong. And it can help lower your risk for disease.  As you get older, your body needs more of certain nutrients. These include vitamin B12, calcium, and vitamin D. " But it may be harder for you to get these and other important nutrients. This could be for many reasons. You may not feel as hungry as you used to. Or you could have problems with your teeth or mouth that make it hard to chew. Or you may not enjoy planning and preparing meals, especially if you live alone.  Talk with your doctor if you want help getting the most nutrition from what you eat. They may have you work with a dietitian to help you plan meals.  Follow-up care is a key part of your treatment and safety. Be sure to make and go to all appointments, and call your doctor if you are having problems. It's also a good idea to know your test results and keep a list of the medicines you take.  How can you care for yourself at home?  To stay healthy  Eat a variety of foods. The more you vary the foods you eat, the more vitamins, minerals, and other nutrients you get.  Ask your doctor if you should take a multivitamin. Choose one with about 100% of the daily value (DV) for vitamins and minerals. Do not take more than 100% of the daily value for any vitamin or mineral unless your doctor tells you to. Talk with your doctor if you are not sure which multivitamin is right for you.  Try to eat lots of fruits and vegetables. Fresh or frozen vegetables and fruits are healthy choices. Choose canned vegetables that have no salt added and fruits that are canned in their own juice or light syrup.  Include foods that are high in vitamin B12 in your diet. Good choices are fortified breakfast cereal, nonfat or low-fat milk and other dairy products, meat, poultry, fish, and eggs.  Get enough calcium and vitamin D. Good choices include nonfat or low-fat milk, cheese, and yogurt. Other good options are tofu, orange juice with added calcium, and some leafy green vegetables, such as rosita greens and kale. If you don't use milk products, talk to your doctor about calcium and vitamin D supplements.  Try to eat protein foods every day.  Good choices include lean meat, fish, poultry, eggs, and cheese. Other good options are cooked beans, peanut butter, and nuts and seeds.  Choose whole grains for half of the grains you eat. Look for 100% whole wheat bread, whole-grain cereals, brown rice, and other whole grains.  If you have constipation  Eat high-fiber foods every day if you can. These include fruits, vegetables, cooked dried beans, and whole grains.  Drink plenty of fluids. If you have kidney, heart, or liver disease and have to limit fluids, talk with your doctor before you increase the amount of fluids you drink.  Ask your doctor if stool softeners may help keep your bowels regular.  If you have mouth problems that make chewing hard  Pick canned or cooked fruits and vegetables. These are often softer.  Chop or shred meat, poultry, and fish. Add sauce or gravy to the meat to help keep it moist.  Pick other protein foods that are soft. These include cheese, peanut butter, cooked beans, cottage cheese, and eggs.  If you have trouble shopping for yourself  Ask a local food store to deliver groceries to your home.  Contact your local area agency on aging and ask about resources that can help.  Ask a family member or neighbor to help you.  If you have trouble preparing meals  Try easier cooking methods such as using a slow cooker or microwave oven.  Let the grocery store do some of the work for you. Look for precut, washed, and ready-to-eat foods.  Take part in group meal programs. You can find these through senior citizen programs.  Have meals brought to your home. Your community may offer programs that deliver meals, such as Meals on Wheels. Or you could use an online meal delivery service.  If you are able, take a cooking class.  If your appetite is poor  Try to eat meals on a regular schedule. It may help to eat smaller meals more often throughout the day.  If you can, eat some meals with other people. You could ask family or friends to eat with  "you. Or you could take part in group meal programs offered in your community.  Ask your doctor if your medicines could cause appetite or taste problems. If so, ask about changing medicines.  Add spices and herbs to increase the flavor of food.  If you think you are depressed, ask your doctor for help. Depression can affect your appetite. And it can make it hard to do everyday activities like grocery shopping and making meals. Treatment can help.  When should you call for help?  Watch closely for changes in your health, and be sure to contact your doctor if you have any problems.  Where can you learn more?  Go to https://www.[employer].net/patiented  Enter L643 in the search box to learn more about \"Nutrition for Older Adults: Care Instructions.\"  Current as of: September 25, 2023               Content Version: 14.0    5105-4955 [employer].   Care instructions adapted under license by your healthcare professional. If you have questions about a medical condition or this instruction, always ask your healthcare professional. [employer] disclaims any warranty or liability for your use of this information.      Preventing Falls: Care Instructions  Injuries and health problems such as trouble walking or poor eyesight can increase your risk of falling. So can some medicines. But there are things you can do to help prevent falls. You can exercise to get stronger. You can also arrange your home to make it safer.    Talk to your doctor about the medicines you take. Ask if any of them increase the risk of falls and whether they can be changed or stopped.   Try to exercise regularly. It can help improve your strength and balance. This can help lower your risk of falling.     Practice fall safety and prevention.    Wear low-heeled shoes that fit well and give your feet good support. Talk to your doctor if you have foot problems that make this hard.  Carry a cellphone or wear a medical alert device that " "you can use to call for help.  Use stepladders instead of chairs to reach high objects. Don't climb if you're at risk for falls. Ask for help, if needed.  Wear the correct eyeglasses, if you need them.    Make your home safer.    Remove rugs, cords, clutter, and furniture from walkways.  Keep your house well lit. Use night-lights in hallways and bathrooms.  Install and use sturdy handrails on stairways.  Wear nonskid footwear, even inside. Don't walk barefoot or in socks without shoes.    Be safe outside.    Use handrails, curb cuts, and ramps whenever possible.  Keep your hands free by using a shoulder bag or backpack.  Try to walk in well-lit areas. Watch out for uneven ground, changes in pavement, and debris.  Be careful in the winter. Walk on the grass or gravel when sidewalks are slippery. Use de-icer on steps and walkways. Add non-slip devices to shoes.    Put grab bars and nonskid mats in your shower or tub and near the toilet. Try to use a shower chair or bath bench when bathing.   Get into a tub or shower by putting in your weaker leg first. Get out with your strong side first. Have a phone or medical alert device in the bathroom with you.   Where can you learn more?  Go to https://www.[employer].net/patiented  Enter G117 in the search box to learn more about \"Preventing Falls: Care Instructions.\"  Current as of: July 17, 2023               Content Version: 14.0    3370-4708 [employer].   Care instructions adapted under license by your healthcare professional. If you have questions about a medical condition or this instruction, always ask your healthcare professional. [employer] disclaims any warranty or liability for your use of this information.      Hearing Loss: Care Instructions  Overview     Hearing loss is a sudden or slow decrease in how well you hear. It can range from slight to profound. Permanent hearing loss can occur with aging. It also can happen when you are " exposed long-term to loud noise. Examples include listening to loud music, riding motorcycles, or being around other loud machines.  Hearing loss can affect your work and home life. It can make you feel lonely or depressed. You may feel that you have lost your independence. But hearing aids and other devices can help you hear better and feel connected to others.  Follow-up care is a key part of your treatment and safety. Be sure to make and go to all appointments, and call your doctor if you are having problems. It's also a good idea to know your test results and keep a list of the medicines you take.  How can you care for yourself at home?  Avoid loud noises whenever possible. This helps keep your hearing from getting worse.  Always wear hearing protection around loud noises.  Wear a hearing aid as directed.  A professional can help you pick a hearing aid that will work best for you.  You can also get hearing aids over the counter for mild to moderate hearing loss.  Have hearing tests as your doctor suggests. They can show whether your hearing has changed. Your hearing aid may need to be adjusted.  Use other devices as needed. These may include:  Telephone amplifiers and hearing aids that can connect to a television, stereo, radio, or microphone.  Devices that use lights or vibrations. These alert you to the doorbell, a ringing telephone, or a baby monitor.  Television closed-captioning. This shows the words at the bottom of the screen. Most new TVs can do this.  TTY (text telephone). This lets you type messages back and forth on the telephone instead of talking or listening. These devices are also called TDD. When messages are typed on the keyboard, they are sent over the phone line to a receiving TTY. The message is shown on a monitor.  Use text messaging, social media, and email if it is hard for you to communicate by telephone.  Try to learn a listening technique called speechreading. It is not lipreading. You  "pay attention to people's gestures, expressions, posture, and tone of voice. These clues can help you understand what a person is saying. Face the person you are talking to, and have them face you. Make sure the lighting is good. You need to see the other person's face clearly.  Think about counseling if you need help to adjust to your hearing loss.  When should you call for help?  Watch closely for changes in your health, and be sure to contact your doctor if:    You think your hearing is getting worse.     You have new symptoms, such as dizziness or nausea.   Where can you learn more?  Go to https://www.[employer].net/patiented  Enter R798 in the search box to learn more about \"Hearing Loss: Care Instructions.\"  Current as of: September 27, 2023               Content Version: 14.0    [id number] [employer].   Care instructions adapted under license by your healthcare professional. If you have questions about a medical condition or this instruction, always ask your healthcare professional. [employer] disclaims any warranty or liability for your use of this information.      Learning About Sleeping Well  What does sleeping well mean?     Sleeping well means getting enough sleep to feel good and stay healthy. How much sleep is enough varies among people.  The number of hours you sleep and how you feel when you wake up are both important. If you do not feel refreshed, you probably need more sleep. Another sign of not getting enough sleep is feeling tired during the day.  Experts recommend that adults get at least 7 or more hours of sleep per day. Children and older adults need more sleep.  Why is getting enough sleep important?  Getting enough quality sleep is a basic part of good health. When your sleep suffers, your physical health, mood, and your thoughts can suffer too. You may find yourself feeling more grumpy or stressed. Not getting enough sleep also can lead to serious problems, " "including injury, accidents, anxiety, and depression.  What might cause poor sleeping?  Many things can cause sleep problems, including:  Changes to your sleep schedule.  Stress. Stress can be caused by fear about a single event, such as giving a speech. Or you may have ongoing stress, such as worry about work or school.  Depression, anxiety, and other mental or emotional conditions.  Changes in your sleep habits or surroundings. This includes changes that happen where you sleep, such as noise, light, or sleeping in a different bed. It also includes changes in your sleep pattern, such as having jet lag or working a late shift.  Health problems, such as pain, breathing problems, and restless legs syndrome.  Lack of regular exercise.  Using alcohol, nicotine, or caffeine before bed.  How can you help yourself?  Here are some tips that may help you sleep more soundly and wake up feeling more refreshed.  Your sleeping area   Use your bedroom only for sleeping and sex. A bit of light reading may help you fall asleep. But if it doesn't, do your reading elsewhere in the house. Try not to use your TV, computer, smartphone, or tablet while you are in bed.  Be sure your bed is big enough to stretch out comfortably, especially if you have a sleep partner.  Keep your bedroom quiet, dark, and cool. Use curtains, blinds, or a sleep mask to block out light. To block out noise, use earplugs, soothing music, or a \"white noise\" machine.  Your evening and bedtime routine   Create a relaxing bedtime routine. You might want to take a warm shower or bath, or listen to soothing music.  Go to bed at the same time every night. And get up at the same time every morning, even if you feel tired.  What to avoid   Limit caffeine (coffee, tea, caffeinated sodas) during the day, and don't have any for at least 6 hours before bedtime.  Avoid drinking alcohol before bedtime. Alcohol can cause you to wake up more often during the night.  Try not to " "smoke or use tobacco, especially in the evening. Nicotine can keep you awake.  Limit naps during the day, especially close to bedtime.  Avoid lying in bed awake for too long. If you can't fall asleep or if you wake up in the middle of the night and can't get back to sleep within about 20 minutes, get out of bed and go to another room until you feel sleepy.  Avoid taking medicine right before bed that may keep you awake or make you feel hyper or energized. Your doctor can tell you if your medicine may do this and if you can take it earlier in the day.  If you can't sleep   Imagine yourself in a peaceful, pleasant scene. Focus on the details and feelings of being in a place that is relaxing.  Get up and do a quiet or boring activity until you feel sleepy.  Avoid drinking any liquids before going to bed to help prevent waking up often to use the bathroom.  Where can you learn more?  Go to https://www.[employer].net/patiented  Enter J942 in the search box to learn more about \"Learning About Sleeping Well.\"  Current as of: July 10, 2023  Content Version: 14.1 2006-2024 [employer].   Care instructions adapted under license by your healthcare professional. If you have questions about a medical condition or this instruction, always ask your healthcare professional. [employer] disclaims any warranty or liability for your use of this information.    Bladder Training: Care Instructions  Your Care Instructions     Bladder training is used to treat urge incontinence and stress incontinence. Urge incontinence means that the need to urinate comes on so fast that you can't get to a toilet in time. Stress incontinence means that you leak urine because of pressure on your bladder. For example, it may happen when you laugh, cough, or lift something heavy.  Bladder training can increase how long you can wait before you have to urinate. It can also help your bladder hold more urine. And it can give you " better control over the urge to urinate.  It is important to remember that bladder training takes a few weeks to a few months to make a difference. You may not see results right away, but don't give up.  Follow-up care is a key part of your treatment and safety. Be sure to make and go to all appointments, and call your doctor if you are having problems. It's also a good idea to know your test results and keep a list of the medicines you take.  How can you care for yourself at home?  Work with your doctor to come up with a bladder training program that is right for you. You may use one or more of the following methods.  Delayed urination  In the beginning, try to keep from urinating for 5 minutes after you first feel the need to go.  While you wait, take deep, slow breaths to relax. Kegel exercises can also help you delay the need to go to the bathroom.  After some practice, when you can easily wait 5 minutes to urinate, try to wait 10 minutes before you urinate.  Slowly increase the waiting period until you are able to control when you have to urinate.  Scheduled urination  Empty your bladder when you first wake up in the morning.  Schedule times throughout the day when you will urinate.  Start by going to the bathroom every hour, even if you don't need to go.  Slowly increase the time between trips to the bathroom.  When you have found a schedule that works well for you, keep doing it.  If you wake up during the night and have to urinate, do it. Apply your schedule to waking hours only.  Kegel exercises  These tighten and strengthen pelvic muscles, which can help you control the flow of urine. (If doing these exercises causes pain, stop doing them and talk with your doctor.) To do Kegel exercises:  Squeeze your muscles as if you were trying not to pass gas. Or squeeze your muscles as if you were stopping the flow of urine. Your belly, legs, and buttocks shouldn't move.  Hold the squeeze for 3 seconds, then relax  "for 5 to 10 seconds.  Start with 3 seconds, then add 1 second each week until you are able to squeeze for 10 seconds.  Repeat the exercise 10 times a session. Do 3 to 8 sessions a day.  When should you call for help?  Watch closely for changes in your health, and be sure to contact your doctor if:    Your incontinence is getting worse.     You do not get better as expected.   Where can you learn more?  Go to https://www.[employer].net/patiented  Enter V684 in the search box to learn more about \"Bladder Training: Care Instructions.\"  Current as of: November 15, 2023               Content Version: 14.0    [id number] [employer].   Care instructions adapted under license by your healthcare professional. If you have questions about a medical condition or this instruction, always ask your healthcare professional. [employer] disclaims any warranty or liability for your use of this information.      Learning About Depression Screening  What is depression screening?  Depression screening is a way to see if you have depression symptoms. It may be done by a doctor or counselor. It's often part of a routine checkup. That's because your mental health is just as important as your physical health.  Depression is a mental health condition that affects how you feel, think, and act. You may:  Have less energy.  Lose interest in your daily activities.  Feel sad and grouchy for a long time.  Depression is very common. It affects people of all ages.  Many things can lead to depression. Some people become depressed after they have a stroke or find out they have a major illness like cancer or heart disease. The death of a loved one or a breakup may lead to depression. It can run in families. Most experts believe that a combination of inherited genes and stressful life events can cause it.  What happens during screening?  You may be asked to fill out a form about your depression symptoms. You and the doctor will " "discuss your answers. The doctor may ask you more questions to learn more about how you think, act, and feel.  What happens after screening?  If you have symptoms of depression, your doctor will talk to you about your options.  Doctors usually treat depression with medicines or counseling. Often, combining the two works best. Many people don't get help because they think that they'll get over the depression on their own. But people with depression may not get better unless they get treatment.  The cause of depression is not well understood. There may be many factors involved. But if you have depression, it's not your fault.  A serious symptom of depression is thinking about death or suicide. If you or someone you care about talks about this or about feeling hopeless, get help right away.  It's important to know that depression can be treated. Medicine, counseling, and self-care may help.  Where can you learn more?  Go to https://www.[employer]ini.[employer]us/patiented  Enter T185 in the search box to learn more about \"Learning About Depression Screening.\"  Current as of: June 24, 2023  Content Version: 14.1    [id number] [employer].   Care instructions adapted under license by your healthcare professional. If you have questions about a medical condition or this instruction, always ask your healthcare professional. [employer] disclaims any warranty or liability for your use of this information.    Substance Use Disorder: Care Instructions  Overview     You can improve your life and health by stopping your use of alcohol or drugs. When you don't drink or use drugs, you may feel and sleep better. You may get along better with your family, friends, and coworkers. There are medicines and programs that can help with substance use disorder.  How can you care for yourself at home?  Here are some ways to help you stay sober and prevent relapse.  If you have been given medicine to help keep you sober or reduce " your cravings, be sure to take it exactly as prescribed.  Talk to your doctor about programs that can help you stop using drugs or drinking alcohol.  Do not keep alcohol or drugs in your home.  Plan ahead. Think about what you'll say if other people ask you to drink or use drugs. Try not to spend time with people who drink or use drugs.  Use the time and money spent on drinking or drugs to do something that's important to you.  Preventing a relapse  Have a plan to deal with relapse. Learn to recognize changes in your thinking that lead you to drink or use drugs. Get help before you start to drink or use drugs again.  Try to stay away from situations, friends, or places that may lead you to drink or use drugs.  If you feel the need to drink alcohol or use drugs again, seek help right away. Call a trusted friend or family member. Some people get support from organizations such as Narcotics Anonymous or [employer] or from treatment facilities.  If you relapse, get help as soon as you can. Some people make a plan with another person that outlines what they want that person to do for them if they relapse. The plan usually includes how to handle the relapse and who to notify in case of relapse.  Don't give up. Remember that a relapse doesn't mean that you have failed. Use the experience to learn the triggers that lead you to drink or use drugs. Then quit again. Recovery is a lifelong process. Many people have several relapses before they are able to quit for good.  Follow-up care is a key part of your treatment and safety. Be sure to make and go to all appointments, and call your doctor if you are having problems. It's also a good idea to know your test results and keep a list of the medicines you take.  When should you call for help?   Call 911  anytime you think you may need emergency care. For example, call if you or someone else:    Has overdosed or has withdrawal signs. Be sure to tell the emergency workers that  "you are or someone else is using or trying to quit using drugs. Overdose or withdrawal signs may include:  Losing consciousness.  Seizure.  Seeing or hearing things that aren't there (hallucinations).     Is thinking or talking about suicide or harming others.   Where to get help 24 hours a day, 7 days a week   If you or someone you know talks about suicide, self-harm, a mental health crisis, a substance use crisis, or any other kind of emotional distress, get help right away. You can:    Call the Suicide and Crisis Lifeline at 358.     Call 1-033-507-TALK (1-863.184.2400).     Text HOME to 343500 to access the Crisis Text Line.   Consider saving these numbers in your phone.  Go to [employer] for more information or to chat online.  Call your doctor now or seek immediate medical care if:    You are having withdrawal symptoms. These may include nausea or vomiting, sweating, shakiness, and anxiety.   Watch closely for changes in your health, and be sure to contact your doctor if:    You have a relapse.     You need more help or support to stop.   Where can you learn more?  Go to https://www.[employer].net/patiented  Enter H573 in the search box to learn more about \"Substance Use Disorder: Care Instructions.\"  Current as of: November 15, 2023               Content Version: 14.0    [id number] [employer].   Care instructions adapted under license by your healthcare professional. If you have questions about a medical condition or this instruction, always ask your healthcare professional. [employer] disclaims any warranty or liability for your use of this information.         "

## 2024-09-12 NOTE — PROGRESS NOTES
Preventive Care Visit  [facility] Tabitha Weaver MD, Family Medicine  Sep 12, 2024      Assessment & Plan     Encounter for Medicare annual wellness exam  Routine history and physical, updated in EMR.  Does not want to colorectal cancer screening.  Declined all vaccination.  Up-to-date with DEXA which was done March 2023 and was normal bone density.    Essential hypertension  Well-controlled.  On lisinopril and hydrochlorothiazide.  - lisinopril (ZESTRIL) 40 MG tablet  Dispense: 90 tablet; Refill: 3  - hydrochlorothiazide (HYDRODIURIL) 25 MG tablet  Dispense: 90 tablet; Refill: 3    Mild major depression (H24)  Stable  - FLUoxetine (PROZAC) 10 MG capsule  Dispense: 90 capsule; Refill: 3    Essential familial hypercholesterolemia  Tolerating statin well without any issue  - atorvastatin (LIPITOR) 40 MG tablet  Dispense: 90 tablet; Refill: 3    Shortness of breath  Coronary artery calcification of native artery  Worked up by cardiology, has CTA angiogram scheduled in a few weeks. Continues to have sob going up stairs. Discussed going to ED if persistent.   - Lipid panel reflex to direct LDL Non-fasting  - Lipid panel reflex to direct LDL Non-fasting    Type 2 diabetes mellitus with hyperglycemia, without long-term current use of insulin (H)  A1c 6.6. diet controlled. No need for meds yet. F/up in 6-7 months for recheck  - HEMOGLOBIN A1C  - Comprehensive metabolic panel (BMP + Alb, Alk Phos, ALT, AST, Total. Bili, TP)  - CBC with platelets  - HEMOGLOBIN A1C  - Comprehensive metabolic panel (BMP + Alb, Alk Phos, ALT, AST, Total. Bili, TP)  - CBC with platelets    Serum calcium elevated  Takes TUMS many times a day for heart burn. Doesn't want to take prescription med for heart burn. Has tried zantac and pepcid in the past. Doesn't want to switch up hydrochlorothiazide. Lab and monitor for now.  - Parathyroid Hormone Intact  - Vitamin D Deficiency  - Parathyroid Hormone Intact  -  "Vitamin D Deficiency  - TSH with free T4 reflex  - TSH with free T4 reflex    Chronic kidney disease, stage 3a (H)  Lab today               BMI  Estimated body mass index is 29.05 kg/m  as calculated from the following:    Height as of this encounter: 1.613 m (5' 3.5\").    Weight as of this encounter: 75.6 kg (166 lb 9.6 oz).   Weight management plan: Discussed healthy diet and exercise guidelines    Counseling  Appropriate preventive services were addressed with this patient via screening, questionnaire, or discussion as appropriate for fall prevention, nutrition, physical activity, Tobacco-use cessation, social engagement, weight loss and cognition.  Checklist reviewing preventive services available has been given to the patient.  Reviewed patient's diet, addressing concerns and/or questions.   She is at risk for psychosocial distress and has been provided with information to reduce risk.   Discussed possible causes of fatigue. The patient was provided with written information regarding signs of hearing loss.   Information on urinary incontinence and treatment options given to patient.   The patient's PHQ-9 score is consistent with moderate depression. She was provided with information regarding depression.           Alycia Rivera is a 78 year old, presenting for the following:  Physical (AWV: fasting labs, /Discuss calcium levels) and Breathing Problem (Should patient push herself when she is having breathing problems)        9/12/2024    11:05 AM   Additional Questions   Roomed by Melanie SANTIAGO CMA         Health Care Directive  Patient does not have a Health Care Directive or Living Will: Patient states has Advance Directive and will bring in a copy to clinic.    HPI  Chief Complaint   Patient presents with    Physical     AWV: fasting labs,   Discuss calcium levels    Breathing Problem     Should patient push herself when she is having breathing problems                 9/7/2024   General Health   How would " you rate your overall physical health? (!) FAIR   Feel stress (tense, anxious, or unable to sleep) Only a little      (!) STRESS CONCERN      9/7/2024   Nutrition   Diet: I don't know            9/7/2024   Exercise   Days per week of moderate/strenous exercise 0 days   Average minutes spent exercising at this level 0 min      (!) EXERCISE CONCERN      9/7/2024   Social Factors   Frequency of gathering with friends or relatives Once a week   Worry food won't last until get money to buy more No   Food not last or not have enough money for food? No   Do you have housing? (Housing is defined as stable permanent housing and does not include staying ouside in a car, in a tent, in an abandoned building, in an overnight shelter, or couch-surfing.) Yes   Are you worried about losing your housing? No   Lack of transportation? No   Unable to get utilities (heat,electricity)? No            9/12/2024   Fall Risk   Gait Speed Test (Document in seconds) 4.1   Gait Speed Test Interpretation Less than or equal to 5.00 seconds - PASS             9/7/2024   Activities of Daily Living- Home Safety   Needs help with the following daily activites None of the above   Safety concerns in the home None of the above            9/7/2024   Dental   Dentist two times every year? Yes            9/7/2024   Hearing Screening   Hearing concerns? (!) I NEED TO ASK PEOPLE TO SPEAK UP OR REPEAT THEMSELVES.    (!) IT'S HARD TO FOLLOW A CONVERSATION IN A NOISY RESTAURANT OR CROWDED ROOM.    (!) TROUBLE UNDESTANDING A SPEAKER IN A PUBLIC MEETING OR [employer] SERVICE.    (!) TROUBLE FOLLOWING DIALOGUE IN THE THEATHER.    (!) TROUBLE UNDERSTANDING SOFT OR WHISPERED SPEECH.    (!) TROUBLE UNDERSTANDING SPEECH ON THE TELEPHONE       Multiple values from one day are sorted in reverse-chronological order         9/7/2024   Driving Risk Screening   Patient/family members have concerns about driving No            9/7/2024   General Alertness/Fatigue Screening    Have you been more tired than usual lately? (!) YES            2024   Urinary Incontinence Screening   Bothered by leaking urine in past 6 months Yes            2024   TB Screening   Were you born outside of the [place]? No          Today's PHQ-9 Score:       2024    10:29 AM   PHQ-9 SCORE   PHQ-9 Total Score MyChart 15 (Moderately severe depression)   PHQ-9 Total Score 15         2024   Substance Use   Alcohol more than 3/day or more than 7/wk Not Applicable   Do you have a current opioid prescription? No   How severe/bad is pain from 1 to 10? 3/10   Do you use any other substances recreationally? (!) AMPHETAMINES        Social History     Tobacco Use    Smoking status: Former     Current packs/day: 0.00     Average packs/day: 2.0 packs/day for 40.0 years (80.0 ttl pk-yrs)     Types: Cigarettes     Start date: 6/3/1967     Quit date: 6/3/2007     Years since quittin.2     Passive exposure: Never    Smokeless tobacco: Never    Tobacco comments:     quit    Vaping Use    Vaping status: Never Used   Substance Use Topics    Alcohol use: No     Comment: occasional    Drug use: No          Mammogram Screening - After age 74- determine frequency with patient based on health status, life expectancy and patient goals    ASCVD Risk   The 10-year ASCVD risk score (Radha GOODE, et al., 2019) is: 49.4%    Values used to calculate the score:      Age: 78 years      Sex: Female      Is Non- : No      Diabetic: Yes      Tobacco smoker: No      Systolic Blood Pressure: 126 mmHg      Is BP treated: Yes      HDL Cholesterol: 45 mg/dL      Total Cholesterol: 168 mg/dL            Reviewed and updated as needed this visit by Provider                    Past Medical History:   Diagnosis Date    ASCUS on Pap smear 2009    +HR HPV 66,[place]/ECC-negative    ASCUS on Pap smear 2009    Overview:  09:Pap--ASCUS, + HPV 66 (high risk). Recommend colposcopy. 3/4/09:Colpo--cervix  hard to visualized; stenotic. ECC taken--negative--repeat Pap 6 months 2010:Pap--NIL. Repap 6 months. 8/5/10:pap--NIL. Repeat pap 1 year. 1/10/12:Pap--NIL. Rpt in 1 yr. Reminder sent to system    Benign neoplasm of colon 2001    tubular adenomatous polyp    Diverticulosis of colon (without mention of hemorrhage)     HTN (hypertension)     Hyperlipidemia     Osteoarthrosis      Past Surgical History:   Procedure Laterality Date    FACIAL COSMETIC SURGERY      MAMMOPLASTY AUGMENTATION      same time as bilateral reduction states pt.    MAMMOPLASTY REDUCTION Bilateral 2005    bilateral reduction    ORTHOPEDIC SURGERY  2009    left ankle replacement    OTHER SURGICAL HISTORY      tummy tuck    SURGICAL HISTORY OF -       D&C, cone biopsy     SURGICAL HISTORY OF -   3/25/03    colonoscopy - mid sigmoid diverticulosis, moderate; benign sigmoid polyps, Repeat 3 years.     SURGICAL HISTORY OF -   01    colonoscopy    SURGICAL HISTORY OF -       breast augmentation    SURGICAL HISTORY OF -       face lift, tumana agrawal has lost 65 pounds    SURGICAL HISTORY OF -   2001    Oopherectomy unilat, has one left,     SURGICAL HISTORY OF -   child    Tonsils    SURGICAL HISTORY OF -       L knee replacment    ZZC ENLARGE BREAST      Description: Breast Surgery Enlargement Procedure;  Recorded: 2014;     OB History    Para Term  AB Living   3 3 3 0 0 3   SAB IAB Ectopic Multiple Live Births   0 0 0 0 0      # Outcome Date GA Lbr Abundio/2nd Weight Sex Type Anes PTL Lv   3 Term            2 Term            1 Term              Lab work is in process  Current providers sharing in care for this patient include:  Patient Care Team:  Paula Weaver MD as PCP - General (Family Medicine)  Paula Weaver MD as Assigned PCP  Eduarda Gao PA-C as Physician Assistant (Urology)  Juan Oates MD as MD (Cardiovascular Disease)    The following health maintenance  "items are reviewed in Epic and correct as of today:  Health Maintenance   Topic Date Due    Pneumococcal Vaccine: 65+ Years (1 of 2 - PCV) Never done    ZOSTER IMMUNIZATION (1 of 2) Never done    COLORECTAL CANCER SCREENING  01/29/2019    RSV VACCINE (1 - 1-dose 75+ series) Never done    DTAP/TDAP/TD IMMUNIZATION (2 - Td or Tdap) 06/02/2021    INFLUENZA VACCINE (1) Never done    COVID-19 Vaccine (5 - 2023-24 season) 09/01/2024    LIPID  09/07/2024    ANNUAL REVIEW OF HM ORDERS  09/07/2024    MEDICARE ANNUAL WELLNESS VISIT  09/07/2024    A1C  09/13/2024    DEPRESSION 6 MO INDEX REPEAT PHQ-9  09/26/2024    DIABETIC FOOT EXAM  02/08/2025    PHQ-9  03/12/2025    EYE EXAM  04/22/2025    BMP  06/13/2025    MICROALBUMIN  06/13/2025    FALL RISK ASSESSMENT  09/12/2025    ADVANCE CARE PLANNING  09/07/2028    DEXA  03/27/2038    HEPATITIS C SCREENING  Completed    DEPRESSION ACTION PLAN  Completed    HPV IMMUNIZATION  Aged Out    MENINGITIS IMMUNIZATION  Aged Out    RSV MONOCLONAL ANTIBODY  Aged Out    MAMMO SCREENING  Discontinued    LUNG CANCER SCREENING  Discontinued         Review of Systems  Constitutional, neuro, ENT, endocrine, pulmonary, cardiac, gastrointestinal, genitourinary, musculoskeletal, integument and psychiatric systems are negative, except as otherwise noted.     Objective    Exam  /66 (BP Location: Left arm, Patient Position: Sitting, Cuff Size: Adult Regular)   Pulse 63   Temp 97.6  F (36.4  C) (Oral)   Resp 14   Ht 1.613 m (5' 3.5\")   Wt 75.6 kg (166 lb 9.6 oz)   SpO2 96%   BMI 29.05 kg/m     Estimated body mass index is 29.05 kg/m  as calculated from the following:    Height as of this encounter: 1.613 m (5' 3.5\").    Weight as of this encounter: 75.6 kg (166 lb 9.6 oz).    Physical Exam  GENERAL: alert and no distress  EYES: Eyes grossly normal to inspection, PERRL and conjunctivae and sclerae normal  NECK: no adenopathy, no asymmetry, masses, or scars  RESP: lungs clear to auscultation - " no rales, rhonchi or wheezes  CV: regular rate and rhythm, normal S1 S2, no S3 or S4, no murmur, click or rub, no peripheral edema  MS: no gross musculoskeletal defects noted, no edema, chronically swollen left ankle d/t surgery.   SKIN: no suspicious lesions or rashes  NEURO: Normal strength and tone, mentation intact and speech normal  PSYCH: mentation appears normal, affect normal/bright  Diabetic foot exam: normal DP and PT pulses, no trophic changes or ulcerative lesions, and normal sensory exam         9/12/2024   Mini Cog   Clock Draw Score 2 Normal   3 Item Recall 2 objects recalled   Mini Cog Total Score 4              Identified Health Risks:  I have reviewed Opioid Use Disorder and Substance Use Disorder risk factors and made any needed referrals.             Signed Electronically by: Paula Weaver MD    Answers submitted by the patient for this visit:  Patient Health Questionnaire (Submitted on 9/12/2024)  If you checked off any problems, how difficult have these problems made it for you to do your work, take care of things at home, or get along with other people?: Somewhat difficult  PHQ9 TOTAL SCORE: 15

## 2024-09-13 LAB
ALBUMIN SERPL BCG-MCNC: 4.4 G/DL (ref 3.5–5.2)
ALP SERPL-CCNC: 82 U/L (ref 40–150)
ALT SERPL W P-5'-P-CCNC: 18 U/L (ref 0–50)
ANION GAP SERPL CALCULATED.3IONS-SCNC: 10 MMOL/L (ref 7–15)
AST SERPL W P-5'-P-CCNC: 18 U/L (ref 0–45)
BILIRUB SERPL-MCNC: 0.6 MG/DL
BUN SERPL-MCNC: 20.2 MG/DL (ref 8–23)
CALCIUM SERPL-MCNC: 10.5 MG/DL (ref 8.8–10.4)
CHLORIDE SERPL-SCNC: 103 MMOL/L (ref 98–107)
CHOLEST SERPL-MCNC: 152 MG/DL
CREAT SERPL-MCNC: 1.02 MG/DL (ref 0.51–0.95)
EGFRCR SERPLBLD CKD-EPI 2021: 56 ML/MIN/1.73M2
FASTING STATUS PATIENT QL REPORTED: YES
FASTING STATUS PATIENT QL REPORTED: YES
GLUCOSE SERPL-MCNC: 99 MG/DL (ref 70–99)
HCO3 SERPL-SCNC: 27 MMOL/L (ref 22–29)
HDLC SERPL-MCNC: 45 MG/DL
LDLC SERPL CALC-MCNC: 80 MG/DL
NONHDLC SERPL-MCNC: 107 MG/DL
POTASSIUM SERPL-SCNC: 4.2 MMOL/L (ref 3.4–5.3)
PROT SERPL-MCNC: 7.1 G/DL (ref 6.4–8.3)
PTH-INTACT SERPL-MCNC: 33 PG/ML (ref 15–65)
SODIUM SERPL-SCNC: 140 MMOL/L (ref 135–145)
TRIGL SERPL-MCNC: 136 MG/DL
TSH SERPL DL<=0.005 MIU/L-ACNC: 2.18 UIU/ML (ref 0.3–4.2)
VIT D+METAB SERPL-MCNC: 43 NG/ML (ref 20–50)

## 2024-10-07 RX ORDER — METOPROLOL TARTRATE 25 MG/1
25-100 TABLET, FILM COATED ORAL
Status: DISCONTINUED | OUTPATIENT
Start: 2024-10-07 | End: 2024-10-25 | Stop reason: HOSPADM

## 2024-10-07 RX ORDER — DILTIAZEM HYDROCHLORIDE 5 MG/ML
10-15 INJECTION INTRAVENOUS
Status: DISCONTINUED | OUTPATIENT
Start: 2024-10-07 | End: 2024-10-25 | Stop reason: HOSPADM

## 2024-10-07 RX ORDER — DILTIAZEM HYDROCHLORIDE 30 MG/1
120 TABLET, FILM COATED ORAL
Status: DISCONTINUED | OUTPATIENT
Start: 2024-10-07 | End: 2024-10-25 | Stop reason: HOSPADM

## 2024-10-07 RX ORDER — NITROGLYCERIN 0.4 MG/1
0.4 TABLET SUBLINGUAL
Status: DISCONTINUED | OUTPATIENT
Start: 2024-10-07 | End: 2024-10-25 | Stop reason: HOSPADM

## 2024-10-07 RX ORDER — METOPROLOL TARTRATE 1 MG/ML
5-20 INJECTION, SOLUTION INTRAVENOUS
Status: DISCONTINUED | OUTPATIENT
Start: 2024-10-07 | End: 2024-10-25 | Stop reason: HOSPADM

## 2024-10-18 ENCOUNTER — PATIENT OUTREACH (OUTPATIENT)
Dept: CARE COORDINATION | Facility: CLINIC | Age: 79
End: 2024-10-18
Payer: COMMERCIAL

## 2024-10-24 ENCOUNTER — HOSPITAL ENCOUNTER (OUTPATIENT)
Dept: CT IMAGING | Facility: CLINIC | Age: 79
Discharge: HOME | End: 2024-10-24
Attending: INTERNAL MEDICINE | Admitting: INTERNAL MEDICINE
Payer: COMMERCIAL

## 2024-10-24 VITALS — SYSTOLIC BLOOD PRESSURE: 118 MMHG | DIASTOLIC BLOOD PRESSURE: 59 MMHG

## 2024-10-24 DIAGNOSIS — I10 BENIGN ESSENTIAL HYPERTENSION: ICD-10-CM

## 2024-10-24 DIAGNOSIS — I25.10 CORONARY ARTERY CALCIFICATION OF NATIVE ARTERY: ICD-10-CM

## 2024-10-24 DIAGNOSIS — Z82.49 FAMILY HISTORY OF ISCHEMIC HEART DISEASE: ICD-10-CM

## 2024-10-24 DIAGNOSIS — I25.84 CORONARY ARTERY CALCIFICATION OF NATIVE ARTERY: ICD-10-CM

## 2024-10-24 DIAGNOSIS — E78.5 HYPERLIPIDEMIA LDL GOAL <130: Chronic | ICD-10-CM

## 2024-10-24 DIAGNOSIS — R06.02 SHORTNESS OF BREATH: ICD-10-CM

## 2024-10-24 DIAGNOSIS — E11.65 TYPE 2 DIABETES MELLITUS WITH HYPERGLYCEMIA, WITHOUT LONG-TERM CURRENT USE OF INSULIN (H): ICD-10-CM

## 2024-10-24 LAB
BSA FOR ECHO PROCEDURE: 0 M2
CREAT BLD-MCNC: 1.2 MG/DL (ref 0.6–1.1)
EGFRCR SERPLBLD CKD-EPI 2021: 46 ML/MIN/1.73M2

## 2024-10-24 PROCEDURE — 75574 CT ANGIO HRT W/3D IMAGE: CPT | Mod: 26 | Performed by: INTERNAL MEDICINE

## 2024-10-24 PROCEDURE — 250N000013 HC RX MED GY IP 250 OP 250 PS 637: Performed by: INTERNAL MEDICINE

## 2024-10-24 PROCEDURE — 75574 CT ANGIO HRT W/3D IMAGE: CPT

## 2024-10-24 PROCEDURE — 82565 ASSAY OF CREATININE: CPT

## 2024-10-24 PROCEDURE — 250N000011 HC RX IP 250 OP 636: Performed by: INTERNAL MEDICINE

## 2024-10-24 RX ORDER — IOPAMIDOL 755 MG/ML
100 INJECTION, SOLUTION INTRAVASCULAR ONCE
Status: COMPLETED | OUTPATIENT
Start: 2024-10-24 | End: 2024-10-24

## 2024-10-24 RX ADMIN — IOPAMIDOL 75 ML: 755 INJECTION, SOLUTION INTRAVENOUS at 10:25

## 2024-10-24 RX ADMIN — NITROGLYCERIN 0.4 MG: 0.4 TABLET SUBLINGUAL at 10:18

## 2024-10-25 DIAGNOSIS — R06.02 SHORTNESS OF BREATH: Primary | ICD-10-CM

## 2024-10-25 RX ORDER — ISOSORBIDE MONONITRATE 30 MG/1
30 TABLET, EXTENDED RELEASE ORAL DAILY
Qty: 30 TABLET | Refills: 1 | Status: SHIPPED | OUTPATIENT
Start: 2024-10-25

## 2024-11-04 ENCOUNTER — OFFICE VISIT (OUTPATIENT)
Dept: PULMONOLOGY | Facility: CLINIC | Age: 79
End: 2024-11-04
Payer: COMMERCIAL

## 2024-11-04 DIAGNOSIS — R06.02 SHORTNESS OF BREATH: ICD-10-CM

## 2024-11-04 LAB — HGB BLD-MCNC: 12.9 G/DL

## 2024-11-04 PROCEDURE — 99207 PR NO BILLABLE SERVICE THIS VISIT: CPT | Performed by: INTERNAL MEDICINE

## 2024-11-04 PROCEDURE — 85018 HEMOGLOBIN: CPT | Mod: QW

## 2024-11-04 PROCEDURE — 94726 PLETHYSMOGRAPHY LUNG VOLUMES: CPT | Mod: 26 | Performed by: INTERNAL MEDICINE

## 2024-11-04 PROCEDURE — 94375 RESPIRATORY FLOW VOLUME LOOP: CPT | Mod: 26 | Performed by: INTERNAL MEDICINE

## 2024-11-04 PROCEDURE — 94729 DIFFUSING CAPACITY: CPT | Mod: 26 | Performed by: INTERNAL MEDICINE

## 2024-11-05 LAB
DLCOCOR-%PRED-PRE: 97 %
DLCOCOR-PRE: 17.13 ML/MIN/MMHG
DLCOUNC-%PRED-PRE: 95 %
DLCOUNC-PRE: 16.86 ML/MIN/MMHG
DLCOUNC-PRED: 17.62 ML/MIN/MMHG
ERV-%PRED-PRE: 53 %
ERV-PRE: 0.43 L
ERV-PRED: 0.8 L
EXPTIME-PRE: 7.94 SEC
FEF2575-%PRED-PRE: 121 %
FEF2575-PRE: 1.74 L/SEC
FEF2575-PRED: 1.43 L/SEC
FEFMAX-%PRED-PRE: 153 %
FEFMAX-PRE: 7.02 L/SEC
FEFMAX-PRED: 4.56 L/SEC
FEV1-%PRED-PRE: 129 %
FEV1-PRE: 2.22 L
FEV1FEV6-PRE: 76 %
FEV1FEV6-PRED: 78 %
FEV1FVC-PRE: 76 %
FEV1FVC-PRED: 78 %
FEV1SVC-PRE: 73 %
FEV1SVC-PRED: 61 %
FIFMAX-PRE: 5.73 L/SEC
FRCPLETH-%PRED-PRE: 98 %
FRCPLETH-PRE: 2.6 L
FRCPLETH-PRED: 2.64 L
FVC-%PRED-PRE: 131 %
FVC-PRE: 2.92 L
FVC-PRED: 2.22 L
IC-%PRED-PRE: 164 %
IC-PRE: 2.63 L
IC-PRED: 1.6 L
RVPLETH-%PRED-PRE: 100 %
RVPLETH-PRE: 2.17 L
RVPLETH-PRED: 2.15 L
TLCPLETH-%PRED-PRE: 111 %
TLCPLETH-PRE: 5.23 L
TLCPLETH-PRED: 4.69 L
VA-%PRED-PRE: 93 %
VA-PRE: 3.96 L
VC-%PRED-PRE: 109 %
VC-PRE: 3.06 L
VC-PRED: 2.8 L

## 2024-11-18 ENCOUNTER — TELEPHONE (OUTPATIENT)
Dept: CARDIOLOGY | Facility: CLINIC | Age: 79
End: 2024-11-18
Payer: COMMERCIAL

## 2024-11-18 DIAGNOSIS — R06.02 SHORTNESS OF BREATH: ICD-10-CM

## 2024-11-19 RX ORDER — ISOSORBIDE MONONITRATE 30 MG/1
30 TABLET, EXTENDED RELEASE ORAL DAILY
Qty: 90 TABLET | Refills: 1 | OUTPATIENT
Start: 2024-11-19

## 2024-11-19 NOTE — TELEPHONE ENCOUNTER
From: Juan Oates MD  Sent: 11/19/2024  12:42 PM CST  To: Darby Santiago.  Follow-up as planned.  ----- Message -----  From: Darby Clinton  Sent: 11/19/2024  12:08 PM CST  To: Juan Otaes MD    ----- Message from Darby Clinton sent at 11/19/2024 12:08 PM CST -----  Hi Dr. Oates,    Please see update from Pt. Thank you

## 2024-11-19 NOTE — TELEPHONE ENCOUNTER
Spoke with Pt regarding refill request, Pt never started Imdur d/t the listed side effects. PFT performed and normal. Pt denies symptoms. Breathing has improved since her visit with Dr. Oates.  She is not interested in starting any additional medication and will further discuss when she sees her pcp next. Update provided to Dr. Oates-aimee

## 2024-12-09 ENCOUNTER — OFFICE VISIT (OUTPATIENT)
Dept: PODIATRY | Facility: CLINIC | Age: 79
End: 2024-12-09
Payer: COMMERCIAL

## 2024-12-09 ENCOUNTER — ANCILLARY PROCEDURE (OUTPATIENT)
Dept: GENERAL RADIOLOGY | Facility: CLINIC | Age: 79
End: 2024-12-09
Attending: PODIATRIST
Payer: COMMERCIAL

## 2024-12-09 VITALS
HEART RATE: 81 BPM | DIASTOLIC BLOOD PRESSURE: 78 MMHG | WEIGHT: 166 LBS | SYSTOLIC BLOOD PRESSURE: 120 MMHG | BODY MASS INDEX: 28.34 KG/M2 | HEIGHT: 64 IN

## 2024-12-09 DIAGNOSIS — M25.572 CHRONIC PAIN OF LEFT ANKLE: ICD-10-CM

## 2024-12-09 DIAGNOSIS — Z96.662 H/O TOTAL ANKLE REPLACEMENT, LEFT: ICD-10-CM

## 2024-12-09 DIAGNOSIS — M25.572 CHRONIC PAIN OF LEFT ANKLE: Primary | ICD-10-CM

## 2024-12-09 DIAGNOSIS — G89.29 CHRONIC PAIN OF LEFT ANKLE: Primary | ICD-10-CM

## 2024-12-09 DIAGNOSIS — G89.29 CHRONIC PAIN OF LEFT ANKLE: ICD-10-CM

## 2024-12-09 PROCEDURE — 99203 OFFICE O/P NEW LOW 30 MIN: CPT | Performed by: PODIATRIST

## 2024-12-09 PROCEDURE — 73610 X-RAY EXAM OF ANKLE: CPT | Mod: TC | Performed by: RADIOLOGY

## 2024-12-09 NOTE — PROGRESS NOTES
PATIENT HISTORY:  Nika Navarrete is a 79 year old female who presents to clinic {FS CONSULT:108829} with a chief complaint of ***.  The patient is seen {sjaparent:993547}.  The patient relates the pain is primarily located around the ***.  {Precipitating Event:768689}  The patient relates that the symptoms have been going on for several {DAYS:625416}.  The patient has previously tried different shoes, *** with little relief.  The patient is {OCWORK:043749}.  Any previous notes and studies that pertain to the patient's condition were reviewed.    Pertinent medical, surgical and family history was reviewed in the [facility] chart.    Past Medical History:   Past Medical History:   Diagnosis Date    ASCUS on Pap smear 01/01/2009    +HR HPV 66,[place]/ECC-negative    ASCUS on Pap smear 02/16/2009    Overview:  1/30/09:Pap--ASCUS, + HPV 66 (high risk). Recommend colposcopy. 3/4/09:Colpo--cervix hard to visualized; stenotic. ECC taken--negative--repeat Pap 6 months 1/5/2010:Pap--NIL. Repap 6 months. 8/5/10:pap--NIL. Repeat pap 1 year. 1/10/12:Pap--NIL. Rpt in 1 yr. Reminder sent to system    Benign neoplasm of colon 05/01/2001    tubular adenomatous polyp    Diverticulosis of colon (without mention of hemorrhage)     HTN (hypertension)     Hyperlipidemia     Osteoarthrosis        Medications:   Current Outpatient Medications:     atorvastatin (LIPITOR) 40 MG tablet, Take 1 tablet (40 mg) by mouth at bedtime., Disp: 90 tablet, Rfl: 3    ergocalciferol (VITAMIN D2) 400 units (10 mcg) TABS tablet, Take 400 Units by mouth daily. Take 2 capsules once daily, Disp: , Rfl:     FLUoxetine (PROZAC) 10 MG capsule, Take 1 capsule (10 mg) by mouth daily., Disp: 90 capsule, Rfl: 3    hydrochlorothiazide (HYDRODIURIL) 25 MG tablet, Take 1 tablet (25 mg) by mouth daily., Disp: 90 tablet, Rfl: 3    lisinopril (ZESTRIL) 40 MG tablet, Take 1 tablet (40 mg) by mouth daily., Disp: 90 tablet, Rfl: 3     Allergies:    Allergies   Allergen  Reactions    Morphine And Codeine Nausea    Seasonal Allergies Itching       Vitals: There were no vitals taken for this visit.  BMI= There is no height or weight on file to calculate BMI.    LOWER EXTREMITY PHYSICAL EXAM    Dermatologic: Skin is intact to {RIGHT /LEFT:613577} lower extremity without significant lesions, rash or abrasion.        Vascular: DP & PT pulses are intact & regular on the {RIGHT /LEFT:465974}.   CFT and skin temperature is normal to the {RIGHT /LEFT:270544} lower extremity.     Neurologic: Lower extremity sensation is intact to light touch.  No evidence of weakness in the {RIGHT /LEFT:501272} lower extremity.        Musculoskeletal: Patient is ambulatory without assistive device or brace.  No gross ankle deformity noted.  No foot or ankle joint effusion is noted.  Noted ***    Diagnostics:  Radiographs included three views of the {RIGHT LEFT BOTH NO:987888} foot demonstrating *** no cortical erosions or periosteal elevation.  All joint margins appear stable.  There is no apparent fracture or tumor formation noted.  There is no evidence of foreign body.  The images were independently reviewed by myself along with the patient explaining the findings.      ASSESSMENT / PLAN:   No diagnosis found.    I have explained to Nika about the conditions.  We discussed the underlying contributing factors to the condition as well as both conservative and surgical treatment options along with expected length of recovery.  At this time, ***    Nika verbalized agreement with and understanding of the rational for the diagnosis and treatment plan.  All questions were answered to best of my ability and the patient's satisfaction. The patient was advised to contact the clinic with any questions that may arise after the clinic visit.      Disclaimer: This note consists of symbols derived from keyboarding, dictation and/or voice recognition software. As a result, there may be errors in the script that  have gone undetected. Please consider this when interpreting information found in this chart.       BESSIE Torres D.P.M., CARMEN.F.A.S.

## 2024-12-09 NOTE — NURSING NOTE
"Chief Complaint   Patient presents with    Consult     History of left ankle surgery       Initial /78   Pulse 81   Ht 1.613 m (5' 3.5\")   Wt 75.3 kg (166 lb)   BMI 28.94 kg/m   Estimated body mass index is 28.94 kg/m  as calculated from the following:    Height as of this encounter: 1.613 m (5' 3.5\").    Weight as of this encounter: 75.3 kg (166 lb).  Medications and allergies reviewed.      Camille WHITE MA    "

## 2024-12-09 NOTE — LETTER
12/9/2024      Nika Navarrete  [address]  [place] [address]      Dear Colleague,    Thank you for referring your patient, Nika Navarrete, to the [facility]. Please see a copy of my visit note below.    PATIENT HISTORY:  Nika Navarrete is a 79 year old female who presents to clinic   with a chief complaint of a painful swollen left ankle.  The patient is seen by themselves.  The patient relates the pain is primarily located around the left ankle.  Reports insidious onset without acute precipitating event.the patient had undergone a total ankle replacement procedure several years ago.  The patient relates that the symptoms have been going on for several month(s).  The patient has previously tried different shoes with little relief.  The patient is retired.  Any previous notes and studies that pertain to the patient's condition were reviewed.    Pertinent medical, surgical and family history was reviewed in the [facility] chart.    Past Medical History:   Past Medical History:   Diagnosis Date     ASCUS on Pap smear 01/01/2009    +HR HPV 66,[place]/ECC-negative     ASCUS on Pap smear 02/16/2009    Overview:  1/30/09:Pap--ASCUS, + HPV 66 (high risk). Recommend colposcopy. 3/4/09:Colpo--cervix hard to visualized; stenotic. ECC taken--negative--repeat Pap 6 months 1/5/2010:Pap--NIL. Repap 6 months. 8/5/10:pap--NIL. Repeat pap 1 year. 1/10/12:Pap--NIL. Rpt in 1 yr. Reminder sent to system     Benign neoplasm of colon 05/01/2001    tubular adenomatous polyp     Diverticulosis of colon (without mention of hemorrhage)      HTN (hypertension)      Hyperlipidemia      Osteoarthrosis        Medications:   Current Outpatient Medications:      atorvastatin (LIPITOR) 40 MG tablet, Take 1 tablet (40 mg) by mouth at bedtime., Disp: 90 tablet, Rfl: 3     FLUoxetine (PROZAC) 10 MG capsule, Take 1 capsule (10 mg) by mouth daily., Disp: 90 capsule, Rfl: 3     hydrochlorothiazide  "(HYDRODIURIL) 25 MG tablet, Take 1 tablet (25 mg) by mouth daily., Disp: 90 tablet, Rfl: 3     lisinopril (ZESTRIL) 40 MG tablet, Take 1 tablet (40 mg) by mouth daily., Disp: 90 tablet, Rfl: 3     Allergies:    Allergies   Allergen Reactions     Morphine And Codeine Nausea     Seasonal Allergies Itching       Vitals: /78   Pulse 81   Ht 1.613 m (5' 3.5\")   Wt 75.3 kg (166 lb)   BMI 28.94 kg/m    BMI= Body mass index is 28.94 kg/m .    LOWER EXTREMITY PHYSICAL EXAM    Dermatologic: Skin is intact to left lower extremity without significant lesions, rash or abrasion.        Vascular: DP & PT pulses are intact & regular on the left.   CFT and skin temperature is normal to the left lower extremity.     Neurologic: Lower extremity sensation is intact to light touch.  No evidence of weakness in the left lower extremity.        Musculoskeletal: Patient is ambulatory without assistive device or brace.  No gross ankle deformity noted.  No foot or ankle joint effusion is noted.  Noted edema around the left ankle with limited ankle range of motion.  Noted pain around the left ankle.    Diagnostics:        EXAM: ANKLE LEFT THREE OR MORE VIEWS  DATE/TIME: 12/9/2024 12:51 PM     INDICATION: Left ankle pain.  COMPARISON: 1/2/2019                                                                      IMPRESSION: Redemonstrated are changes related to placement of a total  left ankle arthroplasty. Extensive destructive periprosthetic cystic  change along the distal tibial component/tibial plafond component with  cortical destruction along the anteromedial base of the medial  malleolus and associated marked soft tissue swelling or mass  particularly along the anterior ankle. In addition, there is lucency  deep to the talar component with bone loss involving the talar dome.  These findings raise the possibility of osteolytic destructive change  from particle disease or infection. Clinical correlation is required.  Soft tissue " mass difficult to exclude anteriorly. Chronic-appearing  bony fragmentation about the ankle joint both anterior and posterior.  Chronic healed distal tibial shaft fracture deformity with previous  screw fixation hardware across the distal syndesmosis. Chronic bone  loss medial aspect of the distal fibula at the lateral malleolus. No  acute-appearing left ankle fracture. Dorsal talonavicular spurring.  Arterial calcification.     NOTE: ABNORMAL REPORT     THE DICTATION ABOVE DESCRIBES AN ABNORMAL REPORT FOR WHICH FOLLOW-UP  IS NEEDED.     CORINNE CORNEJO MD     ASSESSMENT / PLAN:     ICD-10-CM    1. Chronic pain of left ankle  M25.572 XR Ankle Left G/E 3 Views    G89.29 Orthopedic  Referral      2. H/O total ankle replacement, left  Z96.662 Orthopedic  Referral          I have explained to Nika about the conditions.  We discussed the underlying contributing factors to the condition as well as both conservative and surgical treatment options along with expected length of recovery.  At this time, the patient was referred to orthopedics for further evaluation and treatment options of the left ankle condition.    Nika verbalized agreement with and understanding of the rational for the diagnosis and treatment plan.  All questions were answered to best of my ability and the patient's satisfaction. The patient was advised to contact the clinic with any questions that may arise after the clinic visit.      Disclaimer: This note consists of symbols derived from keyboarding, dictation and/or voice recognition software. As a result, there may be errors in the script that have gone undetected. Please consider this when interpreting information found in this chart.       CHRISTOPHER Junior.P.M., F.A.C.F.A.S.      Again, thank you for allowing me to participate in the care of your patient.        Sincerely,        Haseeb Torres DPM    Electronically signed

## 2024-12-17 ENCOUNTER — MYC MEDICAL ADVICE (OUTPATIENT)
Dept: OTHER | Age: 79
End: 2024-12-17

## 2024-12-17 ENCOUNTER — TELEPHONE (OUTPATIENT)
Dept: FAMILY MEDICINE | Facility: CLINIC | Age: 79
End: 2024-12-17
Payer: COMMERCIAL

## 2024-12-17 NOTE — TELEPHONE ENCOUNTER
Patient Returning Call    Reason for call:  Patient said that she can barely walk and will like to see provider sooner than 01/016/2024 because she just seen the Dr at [facility]    Information relayed to patient:      Patient has additional questions:  Yes    What are your questions/concerns:  above    Who does the patient want to speak with:      Is an  needed?:  No      Could we send this information to you in [facility] or would you prefer to receive a phone call?:   Patient would prefer a phone call   Okay to leave a detailed message?: Yes at Cell number on file:    Telephone Information:   home [phone]

## 2024-12-17 NOTE — TELEPHONE ENCOUNTER
Attempted to call patient, left message for return call to clinic. Please transfer to a nurse to gather more information when she returns call. Thanks!    Anya Smith RN

## 2024-12-18 NOTE — TELEPHONE ENCOUNTER
Incoming call from patient.  She states that she is getting notices that she needs follow-up with PCP, writer unable to find any documentation of any sort of follow-up needed.  Patient also states that she needs lab work done from [facility], advised patient if she would like [facility] labs done at [place] we would need them to fax the lab orders.  In the middle of the call patient received another phone call and said she would call back if she needed to

## 2024-12-18 NOTE — TELEPHONE ENCOUNTER
Patient returning call. 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 Apparently, it was also advised that she get some labs done. Patient was reaching out to have labs done with [facility]. Advised patient to reach out to [facility] and request that they send lab orders over to [facility] to be completed. If [facility] is not willing to place the orders, patient will find out specifically which labs they are looking for and call back to get scheduled with PCP.    Anya Smith RN

## 2024-12-18 NOTE — TELEPHONE ENCOUNTER
General Call    Contacts       Contact Date/Time Type Contact Phone/Fax    12/17/2024 03:30 PM CST Phone (Incoming) Nicole Navarrete (Self) [phone] (H)    12/17/2024 05:15 PM CST Phone (Outgoing) Nicole Navarrete (Self) [phone] (H)    Left Message     12/18/2024 03:10 PM CST Phone (Incoming) Nicole Navarrete (Self) [phone] (H)          Reason for Call:  patient called in and I relayed the message again from provider. She stated she will call [facility] to get their number to send order for labs to us.     Could we send this information to you in [facility] or would you prefer to receive a phone call?:   Patient would prefer a phone call   Okay to leave a detailed message?: Yes at Cell number on file:    Telephone Information:   Mobile [phone]

## 2024-12-20 DIAGNOSIS — E11.65 TYPE 2 DIABETES MELLITUS WITH HYPERGLYCEMIA, WITHOUT LONG-TERM CURRENT USE OF INSULIN (H): Primary | ICD-10-CM

## 2024-12-20 DIAGNOSIS — M25.579 PAIN IN JOINT INVOLVING ANKLE AND FOOT, UNSPECIFIED LATERALITY: ICD-10-CM

## 2024-12-29 ENCOUNTER — HEALTH MAINTENANCE LETTER (OUTPATIENT)
Age: 79
End: 2024-12-29

## 2025-01-16 PROBLEM — F33.1 MODERATE RECURRENT MAJOR DEPRESSION (H): Status: ACTIVE | Noted: 2025-01-16

## 2025-01-28 ENCOUNTER — TRANSFERRED RECORDS (OUTPATIENT)
Dept: HEALTH INFORMATION MANAGEMENT | Facility: CLINIC | Age: 80
End: 2025-01-28
Payer: COMMERCIAL

## 2025-01-28 ENCOUNTER — PATIENT OUTREACH (OUTPATIENT)
Dept: FAMILY MEDICINE | Facility: CLINIC | Age: 80
End: 2025-01-28
Payer: COMMERCIAL

## 2025-01-28 NOTE — TELEPHONE ENCOUNTER
Patient Quality Outreach    Patient is due for the following:   Depression  -  PHQ-9 needed      Topic Date Due    Pneumococcal Vaccine (1 of 2 - PCV) Never done    Zoster (Shingles) Vaccine (1 of 2) Never done    Diptheria Tetanus Pertussis (DTAP/TDAP/TD) Vaccine (2 - Td or Tdap) 06/02/2021    Flu Vaccine (1) Never done    COVID-19 Vaccine (5 - 2024-25 season) 09/01/2024       Action(s) Taken:   Patient has upcoming appointment, these items will be addressed at that time.    Type of outreach:    Chart review performed, no outreach needed.    Questions for provider review:    None           Melanie Long MA

## 2025-04-19 ENCOUNTER — HEALTH MAINTENANCE LETTER (OUTPATIENT)
Age: 80
End: 2025-04-19

## 2025-05-02 PROBLEM — N18.31 TYPE 2 DIABETES MELLITUS WITH STAGE 3A CHRONIC KIDNEY DISEASE, WITHOUT LONG-TERM CURRENT USE OF INSULIN (H): Status: ACTIVE | Noted: 2025-05-02

## 2025-05-02 PROBLEM — E11.22 TYPE 2 DIABETES MELLITUS WITH STAGE 3A CHRONIC KIDNEY DISEASE, WITHOUT LONG-TERM CURRENT USE OF INSULIN (H): Status: ACTIVE | Noted: 2025-05-02

## 2025-05-05 ENCOUNTER — TELEPHONE (OUTPATIENT)
Dept: FAMILY MEDICINE | Facility: CLINIC | Age: 80
End: 2025-05-05
Payer: COMMERCIAL

## 2025-05-05 DIAGNOSIS — E83.52 HYPERCALCEMIA: Primary | ICD-10-CM

## 2025-05-05 NOTE — TELEPHONE ENCOUNTER
----- Message from Paulaguerita Weaver sent at 5/5/2025  2:20 PM CDT -----  Please call pt: calcium is increasing I'm suspecting this is due to hydrochlorothiazide she's taking. Let's have her discontinue hydrochlorothiazide altogether. Please have her come back in 2 weeks for nurse only BP check and lab only appointment to recheck calcium. Rest of her lab is fine.

## 2025-05-20 ENCOUNTER — ALLIED HEALTH/NURSE VISIT (OUTPATIENT)
Dept: FAMILY MEDICINE | Facility: CLINIC | Age: 80
End: 2025-05-20
Payer: COMMERCIAL

## 2025-05-20 ENCOUNTER — LAB (OUTPATIENT)
Dept: LAB | Facility: CLINIC | Age: 80
End: 2025-05-20
Payer: COMMERCIAL

## 2025-05-20 VITALS — SYSTOLIC BLOOD PRESSURE: 125 MMHG | DIASTOLIC BLOOD PRESSURE: 60 MMHG | HEART RATE: 51 BPM

## 2025-05-20 DIAGNOSIS — E11.22 TYPE 2 DIABETES MELLITUS WITH STAGE 3A CHRONIC KIDNEY DISEASE, WITHOUT LONG-TERM CURRENT USE OF INSULIN (H): Primary | ICD-10-CM

## 2025-05-20 DIAGNOSIS — Z01.30 BLOOD PRESSURE CHECK: Primary | ICD-10-CM

## 2025-05-20 DIAGNOSIS — N18.31 TYPE 2 DIABETES MELLITUS WITH STAGE 3A CHRONIC KIDNEY DISEASE, WITHOUT LONG-TERM CURRENT USE OF INSULIN (H): Primary | ICD-10-CM

## 2025-05-20 DIAGNOSIS — E83.52 HYPERCALCEMIA: ICD-10-CM

## 2025-05-20 PROCEDURE — 36415 COLL VENOUS BLD VENIPUNCTURE: CPT

## 2025-05-20 PROCEDURE — 82570 ASSAY OF URINE CREATININE: CPT

## 2025-05-20 PROCEDURE — 3078F DIAST BP <80 MM HG: CPT

## 2025-05-20 PROCEDURE — 99207 PR NO CHARGE NURSE ONLY: CPT

## 2025-05-20 PROCEDURE — 3074F SYST BP LT 130 MM HG: CPT

## 2025-05-20 PROCEDURE — 83970 ASSAY OF PARATHORMONE: CPT

## 2025-05-20 PROCEDURE — 80048 BASIC METABOLIC PNL TOTAL CA: CPT

## 2025-05-20 PROCEDURE — 82330 ASSAY OF CALCIUM: CPT

## 2025-05-20 PROCEDURE — 82043 UR ALBUMIN QUANTITATIVE: CPT

## 2025-05-20 NOTE — PROGRESS NOTES
"Follow Up Blood Pressure Check    Nika Navarrete is a 79 year old female recommended to follow up for blood pressure check by Paula Weaver. Anihypertensive medications and adherence were verified: Yes.   -lisinopril 40 mg every day     Reason for visit:   Per 5/2/25 OV notes,   \"Essential hypertension  Blood pressure is mildly low today 96/60.  Will decrease hydrochlorothiazide to 12.5 mg daily.  Continue with lisinopril 40 mg daily.\"    Then, 5/5 telephone encounter states,  \"Please call pt: calcium is increasing I'm suspecting this is due to hydrochlorothiazide she's taking. Let's have her discontinue hydrochlorothiazide altogether. Please have her come back in 2 weeks for nurse only BP check and lab only appointment to recheck calcium. Rest of her lab is fine \"    Medication change at last visit: hydrochlorothiazide decreased and then discontinued, see above documentation for further information.    Today's Vitals:   Vitals:    05/20/25 1136   BP: 125/60   BP Location: Left arm   Patient Position: Chair   Cuff Size: Adult Regular   Pulse: 51       Home blood pressure readings brought in today:   N/A    Lowest blood pressure today is less than 140/90 and they deny signs or symptoms of new onset: severe headache, fatigue, confusion, vision changes, chest pain, pounding in the chest, neck, ears, irregular heartbeat, difficulty breathing, and blood in the urine.  Please inform patient of his/her blood pressure today.  If they are asymptomatic, the patient is to continue current medications.  This message will be routed to their provider, and they will be notified if a change in medication is recommended.    Anya Smith RN    Current Outpatient Medications   Medication Sig Dispense Refill    atorvastatin (LIPITOR) 40 MG tablet Take 1 tablet (40 mg) by mouth at bedtime. 90 tablet 3    FLUoxetine (PROZAC) 10 MG capsule Take 1 capsule (10 mg) by mouth daily. 90 capsule 3    lisinopril (ZESTRIL) 40 MG tablet " Take 1 tablet (40 mg) by mouth daily. 90 tablet 3

## 2025-05-21 ENCOUNTER — RESULTS FOLLOW-UP (OUTPATIENT)
Dept: FAMILY MEDICINE | Facility: CLINIC | Age: 80
End: 2025-05-21
Payer: COMMERCIAL

## 2025-05-21 LAB
ANION GAP SERPL CALCULATED.3IONS-SCNC: 11 MMOL/L (ref 7–15)
BUN SERPL-MCNC: 17.8 MG/DL (ref 8–23)
CA-I BLD-MCNC: 5.2 MG/DL (ref 4.4–5.2)
CALCIUM SERPL-MCNC: 9.9 MG/DL (ref 8.8–10.4)
CHLORIDE SERPL-SCNC: 104 MMOL/L (ref 98–107)
CREAT SERPL-MCNC: 0.97 MG/DL (ref 0.51–0.95)
CREAT UR-MCNC: 30.6 MG/DL
EGFRCR SERPLBLD CKD-EPI 2021: 59 ML/MIN/1.73M2
GLUCOSE SERPL-MCNC: 109 MG/DL (ref 70–99)
HCO3 SERPL-SCNC: 24 MMOL/L (ref 22–29)
MICROALBUMIN UR-MCNC: 28.1 MG/L
MICROALBUMIN/CREAT UR: 91.83 MG/G CR (ref 0–25)
POTASSIUM SERPL-SCNC: 4.2 MMOL/L (ref 3.4–5.3)
PTH-INTACT SERPL-MCNC: 46 PG/ML (ref 15–65)
SODIUM SERPL-SCNC: 139 MMOL/L (ref 135–145)

## 2025-05-23 ENCOUNTER — TELEPHONE (OUTPATIENT)
Dept: FAMILY MEDICINE | Facility: CLINIC | Age: 80
End: 2025-05-23
Payer: COMMERCIAL

## 2025-05-23 NOTE — TELEPHONE ENCOUNTER
General Call    Contacts       Contact Date/Time Type Contact Phone/Fax    05/23/2025 02:24 PM CDT Phone (Incoming) Derrek Navarretelauri TRAN (Self) [phone] ()          Reason for Call:  pt would like to get a call back to discuss lab results and discuss thyroid test.        Could we send this information to you in [facility] or would you prefer to receive a phone call?:   Patient would prefer a phone call   Okay to leave a detailed message?: Yes at Home number on file [phone] ([place])

## 2025-05-29 NOTE — TELEPHONE ENCOUNTER
Reach out to patient and discussed concerns.  Dr. Weaver ordered a thyroid ultrasound to assess for some neck enlargement which was discussed with the patient including providing her with the imaging scheduling phone number.  In addition her lab results will be mailed.    Damion Harris RN     [facility]

## 2025-06-03 ENCOUNTER — HOSPITAL ENCOUNTER (OUTPATIENT)
Dept: ULTRASOUND IMAGING | Facility: HOSPITAL | Age: 80
Discharge: HOME | End: 2025-06-03
Attending: FAMILY MEDICINE
Payer: COMMERCIAL

## 2025-06-03 DIAGNOSIS — R22.1 NECK ENLARGEMENT: ICD-10-CM

## 2025-06-03 PROCEDURE — 76536 US EXAM OF HEAD AND NECK: CPT

## 2025-06-05 ENCOUNTER — TELEPHONE (OUTPATIENT)
Dept: FAMILY MEDICINE | Facility: CLINIC | Age: 80
End: 2025-06-05
Payer: COMMERCIAL

## 2025-06-05 NOTE — TELEPHONE ENCOUNTER
General Call    Contacts       Contact Date/Time Type Contact Phone/Fax    06/05/2025 09:08 AM CDT Phone (Incoming) Nicole Navarrete (Self) [phone] (H)          Reason for Call:  patient looking for interpretation on ultrasound imaging that was done on 6-3 this week.   Please advise, patient is looking for a call back regarding this.       Could we send this information to you in [facility] or would you prefer to receive a phone call?:   Patient would prefer a phone call   Okay to leave a detailed message?: Yes at Cell number on file:    Telephone Information:   Mobile [phone]

## 2025-06-06 ENCOUNTER — RESULTS FOLLOW-UP (OUTPATIENT)
Dept: FAMILY MEDICINE | Facility: CLINIC | Age: 80
End: 2025-06-06

## 2025-06-06 NOTE — TELEPHONE ENCOUNTER
Please relay results to patient when calls back     NATHAN Oviedo, Paula Lu MD to [facility] (Selected Message)      6/6/25 10:15 AM  Result Note  Please call pt: thyroid US shows a very small calcification in the left thyroid in the upper portion but no nodules. No concern. Let me know if she has question.       Other shock

## 2025-06-10 NOTE — TELEPHONE ENCOUNTER
"Patient returned call. Relayed results from Dr. Weaver \"thyroid US shows a very small calcification in the left thyroid in the upper portion but no nodules. No concern.\" Patient verbalized understanding. She has no further questions.     NATHAN Benjamin   [facility]   "

## 2025-06-10 NOTE — TELEPHONE ENCOUNTER
Left message for patient on both mobile and home phone numbers requesting return call to clinic for results. Attempt 2/3.     NATHAN Benjamin   [facility]

## 2025-06-17 ENCOUNTER — TRANSFERRED RECORDS (OUTPATIENT)
Dept: HEALTH INFORMATION MANAGEMENT | Facility: CLINIC | Age: 80
End: 2025-06-17
Payer: COMMERCIAL

## 2025-08-23 ENCOUNTER — HEALTH MAINTENANCE LETTER (OUTPATIENT)
Age: 80
End: 2025-08-23